# Patient Record
Sex: FEMALE | Race: BLACK OR AFRICAN AMERICAN | NOT HISPANIC OR LATINO | Employment: UNEMPLOYED | URBAN - METROPOLITAN AREA
[De-identification: names, ages, dates, MRNs, and addresses within clinical notes are randomized per-mention and may not be internally consistent; named-entity substitution may affect disease eponyms.]

---

## 2023-02-26 PROBLEM — A59.9 TRICHOMONAS INFECTION: Status: ACTIVE | Noted: 2023-02-26

## 2023-02-26 PROBLEM — F32.A DEPRESSION: Status: ACTIVE | Noted: 2023-02-26

## 2023-02-26 PROBLEM — Q18.1 CYST ON EAR: Status: ACTIVE | Noted: 2023-02-26

## 2023-02-26 PROBLEM — R30.0 DYSURIA: Status: ACTIVE | Noted: 2023-02-26

## 2023-02-26 PROBLEM — I63.9 ISCHEMIC STROKE OF FRONTAL LOBE (MULTI): Status: ACTIVE | Noted: 2023-02-26

## 2023-02-26 PROBLEM — E46 MALNUTRITION (MULTI): Status: ACTIVE | Noted: 2023-02-26

## 2023-02-26 PROBLEM — R53.82 CHRONIC FATIGUE: Status: ACTIVE | Noted: 2023-02-26

## 2023-02-26 PROBLEM — F10.10 ALCOHOL ABUSE: Status: ACTIVE | Noted: 2023-02-26

## 2023-02-26 PROBLEM — M25.50 JOINT PAIN: Status: ACTIVE | Noted: 2023-02-26

## 2023-02-26 PROBLEM — R06.09 DOE (DYSPNEA ON EXERTION): Status: ACTIVE | Noted: 2023-02-26

## 2023-02-26 PROBLEM — J45.909 ASTHMA (HHS-HCC): Status: ACTIVE | Noted: 2023-02-26

## 2023-02-26 PROBLEM — K86.0 ALCOHOL-INDUCED CHRONIC PANCREATITIS (MULTI): Status: ACTIVE | Noted: 2023-02-26

## 2023-02-26 PROBLEM — B35.3 TINEA PEDIS OF BOTH FEET: Status: ACTIVE | Noted: 2023-02-26

## 2023-02-26 PROBLEM — E55.9 VITAMIN D DEFICIENCY: Status: ACTIVE | Noted: 2023-02-26

## 2023-02-26 PROBLEM — R56.9 SEIZURE (MULTI): Status: ACTIVE | Noted: 2023-02-26

## 2023-02-26 PROBLEM — R00.2 HEART PALPITATIONS: Status: ACTIVE | Noted: 2023-02-26

## 2023-02-26 PROBLEM — I10 BENIGN HYPERTENSION: Status: ACTIVE | Noted: 2023-02-26

## 2023-02-26 PROBLEM — R79.0 LOW BLOOD MAGNESIUM: Status: ACTIVE | Noted: 2023-02-26

## 2023-02-26 PROBLEM — L85.3 DRY SKIN: Status: ACTIVE | Noted: 2023-02-26

## 2023-02-26 PROBLEM — B96.89 BACTERIAL VAGINOSIS: Status: ACTIVE | Noted: 2023-02-26

## 2023-02-26 PROBLEM — H52.13 MYOPIA OF BOTH EYES WITH REGULAR ASTIGMATISM: Status: ACTIVE | Noted: 2023-02-26

## 2023-02-26 PROBLEM — D50.9 IRON DEFICIENCY ANEMIA: Status: ACTIVE | Noted: 2023-02-26

## 2023-02-26 PROBLEM — H52.13 MYOPIA WITH PRESBYOPIA OF BOTH EYES: Status: ACTIVE | Noted: 2023-02-26

## 2023-02-26 PROBLEM — F43.10 PTSD (POST-TRAUMATIC STRESS DISORDER): Status: ACTIVE | Noted: 2023-02-26

## 2023-02-26 PROBLEM — F31.70 BIPOLAR DISORDER IN PARTIAL REMISSION (MULTI): Status: ACTIVE | Noted: 2023-02-26

## 2023-02-26 PROBLEM — N76.0 BACTERIAL VAGINOSIS: Status: ACTIVE | Noted: 2023-02-26

## 2023-02-26 PROBLEM — H40.013 OPEN ANGLE WITH BORDERLINE FINDINGS AND LOW GLAUCOMA RISK IN BOTH EYES: Status: ACTIVE | Noted: 2023-02-26

## 2023-02-26 PROBLEM — F17.200 NICOTINE DEPENDENCE: Status: ACTIVE | Noted: 2023-02-26

## 2023-02-26 PROBLEM — H52.223 MYOPIA OF BOTH EYES WITH REGULAR ASTIGMATISM: Status: ACTIVE | Noted: 2023-02-26

## 2023-02-26 PROBLEM — K86.1 PANCREATITIS, CHRONIC (MULTI): Status: ACTIVE | Noted: 2023-02-26

## 2023-02-26 PROBLEM — H52.03 HYPERMETROPIA OF BOTH EYES: Status: ACTIVE | Noted: 2023-02-26

## 2023-02-26 PROBLEM — H52.4 MYOPIA WITH PRESBYOPIA OF BOTH EYES: Status: ACTIVE | Noted: 2023-02-26

## 2023-02-26 PROBLEM — G47.00 INSOMNIA: Status: ACTIVE | Noted: 2023-02-26

## 2023-02-26 PROBLEM — R79.89 ABNORMAL LIVER FUNCTION TEST: Status: ACTIVE | Noted: 2023-02-26

## 2023-02-26 PROBLEM — D17.9 LIPOMA: Status: ACTIVE | Noted: 2023-02-26

## 2023-02-26 PROBLEM — R10.84 GENERALIZED ABDOMINAL PAIN: Status: ACTIVE | Noted: 2023-02-26

## 2023-02-26 RX ORDER — MAGNESIUM L-LACTATE 84 MG
1 TABLET, EXTENDED RELEASE ORAL 2 TIMES DAILY
COMMUNITY
Start: 2021-05-26 | End: 2023-09-22 | Stop reason: ALTCHOICE

## 2023-02-26 RX ORDER — HYDROXYZINE PAMOATE 25 MG/1
CAPSULE ORAL
COMMUNITY
End: 2023-09-22 | Stop reason: ALTCHOICE

## 2023-02-26 RX ORDER — LEVETIRACETAM 1000 MG/1
1 TABLET ORAL 2 TIMES DAILY
COMMUNITY
Start: 2017-02-15 | End: 2023-04-20 | Stop reason: SDUPTHER

## 2023-02-26 RX ORDER — FAMOTIDINE 20 MG/1
TABLET, FILM COATED ORAL
COMMUNITY
End: 2023-09-22 | Stop reason: SDUPTHER

## 2023-02-26 RX ORDER — LANOLIN ALCOHOL/MO/W.PET/CERES
100 CREAM (GRAM) TOPICAL DAILY
COMMUNITY
Start: 2021-05-26 | End: 2023-04-20 | Stop reason: SDUPTHER

## 2023-02-26 RX ORDER — ALBUTEROL SULFATE 90 UG/1
1 AEROSOL, METERED RESPIRATORY (INHALATION) EVERY 4 HOURS PRN
COMMUNITY
Start: 2020-02-18 | End: 2023-12-08 | Stop reason: SDUPTHER

## 2023-02-26 RX ORDER — ROSUVASTATIN CALCIUM 40 MG/1
TABLET, COATED ORAL
COMMUNITY
End: 2023-09-22 | Stop reason: SDUPTHER

## 2023-02-26 RX ORDER — AMLODIPINE BESYLATE 5 MG/1
1 TABLET ORAL DAILY
COMMUNITY
Start: 2020-08-22 | End: 2023-04-20 | Stop reason: SDUPTHER

## 2023-02-26 RX ORDER — DEXTROMETHORPHAN HYDROBROMIDE, GUAIFENESIN 5; 100 MG/5ML; MG/5ML
650 LIQUID ORAL EVERY 6 HOURS PRN
COMMUNITY
Start: 2022-01-14 | End: 2023-09-22 | Stop reason: SDUPTHER

## 2023-02-26 RX ORDER — CHOLECALCIFEROL (VITAMIN D3) 50 MCG
50 TABLET ORAL DAILY
COMMUNITY
End: 2023-09-22 | Stop reason: SDUPTHER

## 2023-02-26 RX ORDER — KETOCONAZOLE 20 MG/G
CREAM TOPICAL
COMMUNITY
Start: 2022-07-20 | End: 2023-09-22 | Stop reason: ALTCHOICE

## 2023-02-26 RX ORDER — LORAZEPAM 1 MG/1
TABLET ORAL
COMMUNITY
End: 2023-09-22 | Stop reason: ALTCHOICE

## 2023-02-26 RX ORDER — DICYCLOMINE HYDROCHLORIDE 10 MG/1
1 CAPSULE ORAL 3 TIMES DAILY
COMMUNITY
Start: 2022-01-14 | End: 2023-09-22 | Stop reason: SDUPTHER

## 2023-02-26 RX ORDER — ONDANSETRON 4 MG/1
4 TABLET, ORALLY DISINTEGRATING ORAL EVERY 8 HOURS PRN
COMMUNITY
Start: 2022-01-15 | End: 2023-09-22 | Stop reason: SDUPTHER

## 2023-02-26 RX ORDER — ASPIRIN 81 MG/1
1 TABLET ORAL DAILY
COMMUNITY
Start: 2017-01-10 | End: 2023-09-22 | Stop reason: SDUPTHER

## 2023-02-26 RX ORDER — OMEPRAZOLE 20 MG/1
20 TABLET, DELAYED RELEASE ORAL
COMMUNITY
Start: 2022-07-20 | End: 2023-09-22 | Stop reason: ALTCHOICE

## 2023-02-26 RX ORDER — MIRTAZAPINE 7.5 MG/1
1 TABLET, FILM COATED ORAL NIGHTLY
COMMUNITY
Start: 2021-05-26 | End: 2023-09-22 | Stop reason: SDUPTHER

## 2023-02-26 RX ORDER — MIRTAZAPINE 15 MG/1
TABLET, FILM COATED ORAL
COMMUNITY
End: 2023-09-22 | Stop reason: ALTCHOICE

## 2023-02-26 RX ORDER — GABAPENTIN 100 MG/1
CAPSULE ORAL
COMMUNITY
End: 2023-09-22 | Stop reason: ALTCHOICE

## 2023-02-26 RX ORDER — IBUPROFEN 800 MG/1
800 TABLET ORAL EVERY 8 HOURS PRN
COMMUNITY

## 2023-02-26 RX ORDER — DOCUSATE SODIUM 100 MG/1
CAPSULE, LIQUID FILLED ORAL
COMMUNITY
Start: 2022-01-20 | End: 2023-09-05 | Stop reason: SDUPTHER

## 2023-02-26 RX ORDER — FOLIC ACID 1 MG/1
1 TABLET ORAL DAILY
COMMUNITY
Start: 2016-09-14 | End: 2023-04-20 | Stop reason: SDUPTHER

## 2023-02-26 RX ORDER — GUAIFENESIN 600 MG/1
TABLET, EXTENDED RELEASE ORAL
COMMUNITY
End: 2023-09-22 | Stop reason: SDUPTHER

## 2023-02-26 RX ORDER — LANOLIN ALCOHOL/MO/W.PET/CERES
1 CREAM (GRAM) TOPICAL DAILY
COMMUNITY
Start: 2021-05-26 | End: 2023-07-18

## 2023-02-26 RX ORDER — NALTREXONE HYDROCHLORIDE 50 MG/1
50 TABLET, FILM COATED ORAL DAILY
COMMUNITY
Start: 2022-09-23 | End: 2023-09-22 | Stop reason: SDUPTHER

## 2023-02-26 RX ORDER — PANCRELIPASE 24000; 76000; 120000 [USP'U]/1; [USP'U]/1; [USP'U]/1
1 CAPSULE, DELAYED RELEASE PELLETS ORAL
COMMUNITY
End: 2023-09-22 | Stop reason: SDUPTHER

## 2023-02-26 RX ORDER — SENNOSIDES 8.6 MG/1
CAPSULE, GELATIN COATED ORAL
COMMUNITY
End: 2023-04-20 | Stop reason: SDUPTHER

## 2023-02-26 RX ORDER — FOLIC ACID 0.4 MG
1 TABLET ORAL DAILY
COMMUNITY
Start: 2022-07-20 | End: 2023-05-12

## 2023-02-26 RX ORDER — TALC
1 POWDER (GRAM) TOPICAL NIGHTLY
COMMUNITY
End: 2023-09-22 | Stop reason: SDUPTHER

## 2023-02-26 RX ORDER — PANTOPRAZOLE SODIUM 40 MG/1
40 TABLET, DELAYED RELEASE ORAL DAILY
COMMUNITY
End: 2023-09-22 | Stop reason: ALTCHOICE

## 2023-04-20 ENCOUNTER — OFFICE VISIT (OUTPATIENT)
Dept: PRIMARY CARE | Facility: CLINIC | Age: 48
End: 2023-04-20
Payer: COMMERCIAL

## 2023-04-20 VITALS
WEIGHT: 108.6 LBS | OXYGEN SATURATION: 100 % | HEART RATE: 104 BPM | BODY MASS INDEX: 19.86 KG/M2 | SYSTOLIC BLOOD PRESSURE: 114 MMHG | TEMPERATURE: 96.4 F | DIASTOLIC BLOOD PRESSURE: 78 MMHG

## 2023-04-20 DIAGNOSIS — B96.89 BV (BACTERIAL VAGINOSIS): ICD-10-CM

## 2023-04-20 DIAGNOSIS — I10 HYPERTENSION, UNSPECIFIED TYPE: ICD-10-CM

## 2023-04-20 DIAGNOSIS — F32.A DEPRESSION, UNSPECIFIED DEPRESSION TYPE: ICD-10-CM

## 2023-04-20 DIAGNOSIS — A59.9 TRICHIMONIASIS: ICD-10-CM

## 2023-04-20 DIAGNOSIS — K59.00 CONSTIPATION, UNSPECIFIED CONSTIPATION TYPE: ICD-10-CM

## 2023-04-20 DIAGNOSIS — N76.0 BV (BACTERIAL VAGINOSIS): ICD-10-CM

## 2023-04-20 DIAGNOSIS — G40.909 SEIZURE DISORDER (MULTI): ICD-10-CM

## 2023-04-20 DIAGNOSIS — R30.0 DYSURIA: ICD-10-CM

## 2023-04-20 DIAGNOSIS — Z13.1 SCREENING FOR DIABETES MELLITUS: ICD-10-CM

## 2023-04-20 DIAGNOSIS — Z78.9 ALCOHOL USE: ICD-10-CM

## 2023-04-20 DIAGNOSIS — N89.8 VAGINAL IRRITATION: Primary | ICD-10-CM

## 2023-04-20 DIAGNOSIS — Z59.41 FOOD INSECURITY: ICD-10-CM

## 2023-04-20 LAB
CLUE CELLS WET PREP: PRESENT
TRICH WET PREP: PRESENT
WBC WET PREP: ABNORMAL /HPF
YEAST PRESENCE WET PREP: ABNORMAL

## 2023-04-20 PROCEDURE — 81003 URINALYSIS AUTO W/O SCOPE: CPT

## 2023-04-20 PROCEDURE — 3078F DIAST BP <80 MM HG: CPT | Performed by: STUDENT IN AN ORGANIZED HEALTH CARE EDUCATION/TRAINING PROGRAM

## 2023-04-20 PROCEDURE — 87210 SMEAR WET MOUNT SALINE/INK: CPT

## 2023-04-20 PROCEDURE — 87491 CHLMYD TRACH DNA AMP PROBE: CPT

## 2023-04-20 PROCEDURE — 99214 OFFICE O/P EST MOD 30 MIN: CPT | Performed by: STUDENT IN AN ORGANIZED HEALTH CARE EDUCATION/TRAINING PROGRAM

## 2023-04-20 PROCEDURE — 87591 N.GONORRHOEAE DNA AMP PROB: CPT

## 2023-04-20 PROCEDURE — 3074F SYST BP LT 130 MM HG: CPT | Performed by: STUDENT IN AN ORGANIZED HEALTH CARE EDUCATION/TRAINING PROGRAM

## 2023-04-20 PROCEDURE — 87661 TRICHOMONAS VAGINALIS AMPLIF: CPT

## 2023-04-20 RX ORDER — LEVETIRACETAM 1000 MG/1
1000 TABLET ORAL 2 TIMES DAILY
Qty: 60 TABLET | Refills: 0 | Status: SHIPPED | OUTPATIENT
Start: 2023-04-20 | End: 2023-09-22 | Stop reason: SDUPTHER

## 2023-04-20 RX ORDER — FOLIC ACID 1 MG/1
1 TABLET ORAL DAILY
Qty: 90 TABLET | Refills: 0 | Status: SHIPPED | OUTPATIENT
Start: 2023-04-20 | End: 2023-07-19

## 2023-04-20 RX ORDER — PSYLLIUM HUSK 3.4 G/5.8G
10 POWDER ORAL DAILY
Qty: 660 G | Refills: 2 | Status: SHIPPED | OUTPATIENT
Start: 2023-04-20 | End: 2023-09-22 | Stop reason: ALTCHOICE

## 2023-04-20 RX ORDER — AMLODIPINE BESYLATE 5 MG/1
5 TABLET ORAL DAILY
Qty: 90 TABLET | Refills: 0 | Status: SHIPPED | OUTPATIENT
Start: 2023-04-20 | End: 2023-06-06 | Stop reason: SDUPTHER

## 2023-04-20 RX ORDER — SENNOSIDES 8.6 MG/1
8.6 CAPSULE, GELATIN COATED ORAL 2 TIMES DAILY
Qty: 180 CAPSULE | Refills: 0 | Status: SHIPPED | OUTPATIENT
Start: 2023-04-20 | End: 2023-07-19

## 2023-04-20 RX ORDER — MULTIVITAMIN
1 TABLET ORAL DAILY
Qty: 90 TABLET | Refills: 0 | Status: SHIPPED | OUTPATIENT
Start: 2023-04-20 | End: 2023-07-19

## 2023-04-20 RX ORDER — LANOLIN ALCOHOL/MO/W.PET/CERES
100 CREAM (GRAM) TOPICAL DAILY
Qty: 90 TABLET | Refills: 0 | Status: SHIPPED | OUTPATIENT
Start: 2023-04-20 | End: 2023-07-19

## 2023-04-20 SDOH — ECONOMIC STABILITY - FOOD INSECURITY: FOOD INSECURITY: Z59.41

## 2023-04-20 ASSESSMENT — PAIN SCALES - GENERAL: PAINLEVEL: 10-WORST PAIN EVER

## 2023-04-20 NOTE — PROGRESS NOTES
Subjective   Patient ID: Darinel Sommers is a 47 y.o. female who presents for UTI (possible).    HPI     DARINEL SOMMERS is a 47 year old Female with PMHx: polysubstance abuse, epilepsy, chronic pancreatitis w/pseudocyst rupture s/p splenic artery embolization (1/2019) who presents to the clinic for burning on urination and foot pain.     HPI  # Burning on urination  Since 1 month ago, patient experienced burning and itching sensation while urinating, but also when sleeping as well. She also reports being tired, but no fever. She finds her urine clear, no foams, and no blood. She also denies vaginal discharge, and has tried Vagisil which has not helped. Physical exam is negative for CVA tenderness and suprapubic tenderness.    # Itching of feet  Patient reports that since a few months ago, she felt itching/pain/pins and needles in the sole of her feet bilaterally when she sleeps. She does not take tylonol or ibuprofen due history of alcohol use and abnormal lifer function test. She denies swelling.    # Abdominal pain  Patient reports ULQ and URQ abdominal pain, tender to palpation. She has PMH constipation, and reports bowel movement of 1-4 times per month. Her last bowel movement was yesterday.     # Substance Use  Patient has a history of alcohol use, abnormal liver function test, and alcohol-induced chronic pancreatitis. She reports drinking wine on the weekends with friends, but does not keep a count of how much she consumes. She also endorses to smoking marijuana, and is interested marijuana cards.     Review of Systems    General: denies fever, chills, malaise  HEENT: denies vision change  CV: denies pain, endorses palpations  Pulm: denies SOB or cough  GI: endorses nausea, denies vomiting, constipation, diarrhea  : denies flank pain, denies hematuria  MSK: no swelling    Objective   /78   Pulse 104   Temp 35.8 °C (96.4 °F) (Tympanic)   Wt 49.3 kg (108 lb 9.6 oz)   SpO2 100%   BMI 19.86 kg/m²      Physical Exam    PHYSICAL EXAM:    - GENERAL: Alert and oriented x 3. No acute distress. Well-nourished.    - EYES: EOMI. Anicteric.    - HENT: Moist mucous membranes. No scleral icterus. No cervical lymphadenopathy.    - LUNGS: Clear to auscultation bilaterally. No accessory muscle use.    - CARDIOVASCULAR: Regular rate and rhythm. No murmur. No JVD.    - ABDOMEN: Soft, non-tender and non-distended. No palpable masses.    - EXTREMITIES: No edema. Non-tender.?SKIN: No rashes or lesions. Warm.    - NEUROLOGIC: No focal neurological deficits. CN II-XII grossly intact, but not individually tested.    - PSYCHIATRIC: Cooperative. Appropriate mood and affect.      Assessment/Plan       DARINEL SOMMERS is a 47 year old Female with PMHx: polysubstance abuse, epilepsy, chronic pancreatitis w/pseudocyst rupture s/p splenic artery embolization (1/2019) who presents to the clinic for burning on urination and foot pain.     # Burning on urination  Urine test for UTI and STIs  Wet prep obtained     # Itching of feet  Patients tingling of the feet could be 2/2 alcohol related neuropathy   Continue to observe, return visit if worsens  Advised alcohol cessation     # Abdominal pain  Patient's abdominal pain is consistent with chronic constipation.  Continue Senna + Colace   Continue with Creon   Rx Psyllium   Advised oral hydration     # Substance Use  Chronic alcoholism often leads to constipation and paresthesia  Discussed with patient the importance of reducing alcohol consumption  Discussed with patient the need to take folic acid regularly  Prescribe multivitamin, including thiamine     #Food Insecurity   -Food for life referral provided     Shital Springer MS3    I saw and evaluated the patient. I personally obtained the key and critical portions of the history and physical exam. I reviewed and modified the student's documentation and discussed patient with the medical student. I agree with the above documentation and medical  decision making.        Addendum:    Wet prep positive for clue cells and trichomonas consistent with BV and trichomoniasis. Prescribed metronidazole 500 twice daily for 7 days.  UA with no concern for UTI.    Patient seen and discussed with attending physician Dr. Silvia Moreira MD  Family Medicine, PGY-2    This note was completed using Dragon voice recognition technology and may include unintended errors with respect to translation of words, typographical errors or grammar errors which may not have been identified while finalizing the chart.

## 2023-04-21 ENCOUNTER — TELEPHONE (OUTPATIENT)
Dept: PRIMARY CARE | Facility: CLINIC | Age: 48
End: 2023-04-21
Payer: COMMERCIAL

## 2023-04-21 LAB
APPEARANCE, URINE: CLEAR
BILIRUBIN, URINE: NEGATIVE
BLOOD, URINE: NEGATIVE
CHLAMYDIA TRACH., AMPLIFIED: NEGATIVE
COLOR, URINE: YELLOW
GLUCOSE, URINE: NEGATIVE MG/DL
KETONES, URINE: NEGATIVE MG/DL
LEUKOCYTE ESTERASE, URINE: NEGATIVE
N. GONORRHEA, AMPLIFIED: NEGATIVE
NITRITE, URINE: NEGATIVE
PH, URINE: 6 (ref 5–8)
PROTEIN, URINE: NEGATIVE MG/DL
SPECIFIC GRAVITY, URINE: 1.01 (ref 1–1.03)
UROBILINOGEN, URINE: <2 MG/DL (ref 0–1.9)

## 2023-04-21 RX ORDER — METRONIDAZOLE 500 MG/1
500 TABLET ORAL 2 TIMES DAILY
Qty: 14 TABLET | Refills: 0 | Status: SHIPPED | OUTPATIENT
Start: 2023-04-21 | End: 2023-04-28

## 2023-04-22 LAB — TRICHOMONAS VAGINALIS: POSITIVE

## 2023-04-22 NOTE — TELEPHONE ENCOUNTER
Patient has Bacterial Vaginosis and Trichmoniasis. Sent prescription for Metronidazole 500 mg twice daily. Patient has not been answering phone call.

## 2023-04-28 NOTE — PROGRESS NOTES
I saw and evaluated the patient. I personally obtained the key and critical portions of the history and physical exam or was physically present for key and critical portions performed by the resident/fellow. I reviewed the resident/fellow's documentation and discussed the patient with the resident/fellow. I agree with the resident/fellow's medical decision making as documented in the note.    Tip Vega MD

## 2023-05-11 DIAGNOSIS — Z00.00 HEALTHCARE MAINTENANCE: Primary | ICD-10-CM

## 2023-05-12 RX ORDER — FOLIC ACID 0.4 MG
TABLET ORAL
Qty: 30 TABLET | Refills: 10 | Status: SHIPPED | OUTPATIENT
Start: 2023-05-12 | End: 2023-09-22 | Stop reason: SDUPTHER

## 2023-06-06 ENCOUNTER — OFFICE VISIT (OUTPATIENT)
Dept: PRIMARY CARE | Facility: CLINIC | Age: 48
End: 2023-06-06
Payer: COMMERCIAL

## 2023-06-06 VITALS
TEMPERATURE: 97.1 F | WEIGHT: 112 LBS | SYSTOLIC BLOOD PRESSURE: 116 MMHG | HEIGHT: 62 IN | HEART RATE: 85 BPM | DIASTOLIC BLOOD PRESSURE: 78 MMHG | BODY MASS INDEX: 20.61 KG/M2 | OXYGEN SATURATION: 97 %

## 2023-06-06 DIAGNOSIS — R22.1 MASS IN NECK: ICD-10-CM

## 2023-06-06 DIAGNOSIS — J30.9 ALLERGIC RHINITIS, UNSPECIFIED SEASONALITY, UNSPECIFIED TRIGGER: Primary | ICD-10-CM

## 2023-06-06 DIAGNOSIS — F10.90 ALCOHOL USE DISORDER: ICD-10-CM

## 2023-06-06 DIAGNOSIS — I10 HYPERTENSION, UNSPECIFIED TYPE: ICD-10-CM

## 2023-06-06 DIAGNOSIS — E63.9 NUTRITIONAL DEFICIENCY: ICD-10-CM

## 2023-06-06 PROCEDURE — 99214 OFFICE O/P EST MOD 30 MIN: CPT | Performed by: STUDENT IN AN ORGANIZED HEALTH CARE EDUCATION/TRAINING PROGRAM

## 2023-06-06 PROCEDURE — 3078F DIAST BP <80 MM HG: CPT | Performed by: STUDENT IN AN ORGANIZED HEALTH CARE EDUCATION/TRAINING PROGRAM

## 2023-06-06 PROCEDURE — 3074F SYST BP LT 130 MM HG: CPT | Performed by: STUDENT IN AN ORGANIZED HEALTH CARE EDUCATION/TRAINING PROGRAM

## 2023-06-06 RX ORDER — FLUTICASONE PROPIONATE 50 MCG
2 SPRAY, SUSPENSION (ML) NASAL DAILY
Qty: 16 G | Refills: 5 | Status: SHIPPED | OUTPATIENT
Start: 2023-06-06 | End: 2023-06-06

## 2023-06-06 RX ORDER — DISULFIRAM 250 MG/1
250 TABLET ORAL DAILY
Qty: 30 TABLET | Refills: 11 | Status: SHIPPED | OUTPATIENT
Start: 2023-06-06 | End: 2023-09-22 | Stop reason: ALTCHOICE

## 2023-06-06 RX ORDER — LORATADINE 10 MG/1
10 TABLET ORAL DAILY
Qty: 90 TABLET | Refills: 3 | Status: SHIPPED | OUTPATIENT
Start: 2023-06-06 | End: 2023-09-22 | Stop reason: ALTCHOICE

## 2023-06-06 RX ORDER — AMLODIPINE BESYLATE 5 MG/1
TABLET ORAL
COMMUNITY
Start: 2023-01-02 | End: 2023-09-22 | Stop reason: SDUPTHER

## 2023-06-06 RX ORDER — AMLODIPINE BESYLATE 5 MG/1
5 TABLET ORAL DAILY
Qty: 90 TABLET | Refills: 0 | Status: SHIPPED | OUTPATIENT
Start: 2023-06-06 | End: 2023-07-25 | Stop reason: SDUPTHER

## 2023-06-06 ASSESSMENT — PAIN SCALES - GENERAL: PAINLEVEL: 2

## 2023-06-06 NOTE — PROGRESS NOTES
"Subjective   Patient ID: Shi Trevino is a 47 y.o. female who presents for Follow-up (Med refill).    HPI   Needs a refill on Amlodipine, and boost (delivered home chocolate or strawberry)   And would like to address multiple issues as follows     # nasal drainage:   Started a couple months ago   When she wakes up in the morning she has runny nose  No itching, associated congestion, associated eye irritation (injection and tearfulness)   Doesn't notice a difference between indoor or outdoor     # Knot behind her ear:   Appeared in childhood, however since then it has been growing in size   Bothering her In appearance but not painful   No drainage or changes to skin      Review of Systems  Review of systems is negative besides what is mentioned in the HPI      Objective   /78 (BP Location: Left arm, Patient Position: Sitting, BP Cuff Size: Adult long)   Pulse 85   Temp 36.2 °C (97.1 °F) (Temporal)   Ht 1.575 m (5' 2\")   Wt 50.8 kg (112 lb)   SpO2 97%   BMI 20.49 kg/m²     Physical Exam  General: Alert and oriented*3, not in acute distress   Cardiac: S1, S2 normal, no murmurs, no lower extremity edema.  Respiratory: CTAB, no wheezing or crackles   HEENT: boggy nasal turbinates and pink mucosa, 2cm soft cystic lesion behind the right auricle, non tender to palpation, intact skin, no drainage or erythema   Psych: appropriate mood and affect to the clinical setting      Assessment/Plan   Ms. Trevino is here today to address multiple complaints and for medication refills.   She has PMHx significant for HTN, alcohol use disorder, bipolar disorder, PTSD, seizure disorder, HTN, asthma, chronic pancreatitis, non obstructive CAD, mitral and aortic regurgitation who is here today for med refill and multiple medical complaints addressed as follow:     # Allergic rhinitis:   - start Loratadine daily   - start Flonase daily     # Dermoid Vs  Bronchial Cyst Vs Lipoma:   - Referral to ENT provided today     Medication " refills provided today   RTC in 4-6 weeks for close F/U   Resources list for Alcohol use provided today   Patient's HPI, physical exam and plan of care was discussed with the attending physician Dr. Inez Santos MD  Family Medicine, PGY-3  Janee

## 2023-06-07 NOTE — PROGRESS NOTES
I saw and evaluated the patient. I personally obtained the key and critical portions of the history and physical exam or was physically present for key and critical portions performed by the resident/fellow. I reviewed the resident/fellow's documentation and discussed the patient with the resident/fellow. I agree with the resident/fellow's medical decision making as documented in the note.    Mir Wiley MD

## 2023-07-12 DIAGNOSIS — Z78.9 ALCOHOL USE: Primary | ICD-10-CM

## 2023-07-18 DIAGNOSIS — Z78.9 ALCOHOL USE: ICD-10-CM

## 2023-07-18 RX ORDER — LANOLIN ALCOHOL/MO/W.PET/CERES
CREAM (GRAM) TOPICAL
Qty: 30 TABLET | Refills: 10 | Status: SHIPPED | OUTPATIENT
Start: 2023-07-18 | End: 2023-07-19

## 2023-07-19 RX ORDER — LANOLIN ALCOHOL/MO/W.PET/CERES
CREAM (GRAM) TOPICAL
Qty: 30 TABLET | Refills: 10 | Status: SHIPPED | OUTPATIENT
Start: 2023-07-19 | End: 2023-09-22 | Stop reason: SDUPTHER

## 2023-08-14 ENCOUNTER — PATIENT OUTREACH (OUTPATIENT)
Dept: CARE COORDINATION | Facility: CLINIC | Age: 48
End: 2023-08-14
Payer: COMMERCIAL

## 2023-08-20 DIAGNOSIS — E63.9 NUTRITIONAL DEFICIENCY: ICD-10-CM

## 2023-09-05 PROBLEM — F10.20 ALCOHOL USE DISORDER, MODERATE, DEPENDENCE (MULTI): Chronic | Status: ACTIVE | Noted: 2022-09-13

## 2023-09-05 PROBLEM — H02.883 MEIBOMIAN GLAND DYSFUNCTION (MGD) OF BOTH EYES: Status: ACTIVE | Noted: 2023-09-05

## 2023-09-05 PROBLEM — Z91.148 NONCOMPLIANCE WITH MEDICATION REGIMEN: Status: ACTIVE | Noted: 2019-11-25

## 2023-09-05 PROBLEM — I81 PORTAL VEIN THROMBOSIS: Status: ACTIVE | Noted: 2017-12-16

## 2023-09-05 PROBLEM — K80.50 CHOLEDOCHOLITHIASIS: Status: ACTIVE | Noted: 2023-05-13

## 2023-09-05 PROBLEM — D53.9 MACROCYTIC ANEMIA: Status: ACTIVE | Noted: 2020-09-14

## 2023-09-05 PROBLEM — K86.3 PSEUDOCYST OF PANCREAS (HHS-HCC): Status: ACTIVE | Noted: 2020-09-14

## 2023-09-05 PROBLEM — E87.29 ALCOHOLIC KETOACIDOSIS: Status: ACTIVE | Noted: 2020-09-14

## 2023-09-05 PROBLEM — H02.886 MEIBOMIAN GLAND DYSFUNCTION (MGD) OF BOTH EYES: Status: ACTIVE | Noted: 2023-09-05

## 2023-09-05 PROBLEM — R94.31 PROLONGED Q-T INTERVAL ON ECG: Status: ACTIVE | Noted: 2020-10-09

## 2023-09-05 PROBLEM — G62.9 PERIPHERAL POLYNEUROPATHY: Status: ACTIVE | Noted: 2020-09-14

## 2023-09-05 PROBLEM — E87.20 METABOLIC ACIDOSIS, NORMAL ANION GAP (NAG): Status: ACTIVE | Noted: 2020-10-09

## 2023-09-05 PROBLEM — D69.6 THROMBOCYTOPENIA (CMS-HCC): Status: ACTIVE | Noted: 2020-09-14

## 2023-09-05 RX ORDER — METOPROLOL TARTRATE 25 MG/1
25 TABLET, FILM COATED ORAL 2 TIMES DAILY
COMMUNITY
Start: 2023-02-13 | End: 2023-09-22 | Stop reason: ALTCHOICE

## 2023-09-05 RX ORDER — PANCRELIPASE 24000; 90750; 86250 [USP'U]/1; [USP'U]/1; [USP'U]/1
1 CAPSULE, DELAYED RELEASE ORAL 3 TIMES DAILY
COMMUNITY
End: 2023-09-22 | Stop reason: SDUPTHER

## 2023-09-05 RX ORDER — ACETAMINOPHEN 500 MG/1
500 CAPSULE, LIQUID FILLED ORAL EVERY 6 HOURS PRN
COMMUNITY
Start: 2018-06-02 | End: 2023-09-22 | Stop reason: SDUPTHER

## 2023-09-05 RX ORDER — UBIDECARENONE 75 MG
500 CAPSULE ORAL
COMMUNITY
Start: 2020-10-13 | End: 2023-09-22 | Stop reason: SDUPTHER

## 2023-09-05 RX ORDER — ALBUTEROL SULFATE 90 UG/1
2 AEROSOL, METERED RESPIRATORY (INHALATION)
COMMUNITY
Start: 2018-05-02 | End: 2023-09-22 | Stop reason: SDUPTHER

## 2023-09-05 RX ORDER — LEVETIRACETAM 500 MG/1
500 TABLET ORAL 2 TIMES DAILY
COMMUNITY
Start: 2020-10-13 | End: 2023-09-22 | Stop reason: ALTCHOICE

## 2023-09-05 RX ORDER — ESOMEPRAZOLE MAGNESIUM 40 MG/1
40 CAPSULE, DELAYED RELEASE ORAL
COMMUNITY
Start: 2020-09-18 | End: 2023-09-22 | Stop reason: ALTCHOICE

## 2023-09-05 RX ORDER — POLYETHYLENE GLYCOL 3350 17 G/17G
17 POWDER, FOR SOLUTION ORAL
COMMUNITY
Start: 2023-05-19 | End: 2023-09-22 | Stop reason: ALTCHOICE

## 2023-09-05 RX ORDER — ACETAMINOPHEN 500 MG
1 TABLET ORAL NIGHTLY
COMMUNITY
Start: 2020-10-13 | End: 2023-09-22 | Stop reason: ALTCHOICE

## 2023-09-05 RX ORDER — DOCUSATE SODIUM 100 MG/1
100 CAPSULE, LIQUID FILLED ORAL 2 TIMES DAILY
COMMUNITY
Start: 2020-10-13 | End: 2023-09-22 | Stop reason: SDUPTHER

## 2023-09-05 RX ORDER — OMEPRAZOLE 40 MG/1
40 CAPSULE, DELAYED RELEASE ORAL
COMMUNITY
Start: 2020-09-17 | End: 2023-09-22 | Stop reason: ALTCHOICE

## 2023-09-05 RX ORDER — SENNOSIDES 8.6 MG/1
8.6 TABLET ORAL 2 TIMES DAILY
COMMUNITY
Start: 2023-05-18 | End: 2023-09-22 | Stop reason: ALTCHOICE

## 2023-09-05 RX ORDER — UBIDECARENONE 30 MG
1 CAPSULE ORAL DAILY
COMMUNITY
Start: 2023-01-03 | End: 2023-09-22 | Stop reason: ALTCHOICE

## 2023-09-05 RX ORDER — MULTIVIT-MIN/IRON FUM/FOLIC AC 7.5 MG-4
TABLET ORAL
COMMUNITY
Start: 2023-08-10 | End: 2023-09-22 | Stop reason: SDUPTHER

## 2023-09-05 RX ORDER — OMEPRAZOLE 20 MG/1
20 CAPSULE, DELAYED RELEASE ORAL DAILY
COMMUNITY
Start: 2023-01-20 | End: 2023-09-22 | Stop reason: ALTCHOICE

## 2023-09-05 RX ORDER — ACETAMINOPHEN 500 MG
1000 TABLET ORAL EVERY 6 HOURS PRN
COMMUNITY
Start: 2023-08-10

## 2023-09-05 RX ORDER — GABAPENTIN 300 MG/1
300 CAPSULE ORAL
COMMUNITY
Start: 2020-10-13 | End: 2023-09-22 | Stop reason: SDUPTHER

## 2023-09-05 RX ORDER — METOPROLOL SUCCINATE 50 MG/1
TABLET, EXTENDED RELEASE ORAL
COMMUNITY
Start: 2023-07-31 | End: 2023-09-22 | Stop reason: ALTCHOICE

## 2023-09-05 RX ORDER — VIT C/E/ZN/COPPR/LUTEIN/ZEAXAN 250MG-90MG
1 CAPSULE ORAL
COMMUNITY
Start: 2020-10-13 | End: 2023-09-22 | Stop reason: ALTCHOICE

## 2023-09-22 ENCOUNTER — OFFICE VISIT (OUTPATIENT)
Dept: PRIMARY CARE | Facility: CLINIC | Age: 48
End: 2023-09-22
Payer: COMMERCIAL

## 2023-09-22 VITALS
DIASTOLIC BLOOD PRESSURE: 72 MMHG | HEART RATE: 93 BPM | WEIGHT: 106.3 LBS | HEIGHT: 62 IN | SYSTOLIC BLOOD PRESSURE: 102 MMHG | OXYGEN SATURATION: 96 % | BODY MASS INDEX: 19.56 KG/M2 | TEMPERATURE: 98.2 F

## 2023-09-22 DIAGNOSIS — Z86.73 HISTORY OF CVA (CEREBROVASCULAR ACCIDENT): ICD-10-CM

## 2023-09-22 DIAGNOSIS — F17.210 CIGARETTE NICOTINE DEPENDENCE WITHOUT COMPLICATION: ICD-10-CM

## 2023-09-22 DIAGNOSIS — E55.9 VITAMIN D DEFICIENCY: ICD-10-CM

## 2023-09-22 DIAGNOSIS — M62.89 HIGH-TONE PELVIC FLOOR DYSFUNCTION: Primary | ICD-10-CM

## 2023-09-22 DIAGNOSIS — F31.70 BIPOLAR DISORDER IN PARTIAL REMISSION, MOST RECENT EPISODE UNSPECIFIED TYPE (MULTI): ICD-10-CM

## 2023-09-22 DIAGNOSIS — K86.0 ALCOHOL-INDUCED CHRONIC PANCREATITIS (MULTI): ICD-10-CM

## 2023-09-22 DIAGNOSIS — Z78.9 ALCOHOL USE: ICD-10-CM

## 2023-09-22 DIAGNOSIS — G40.909 SEIZURE DISORDER (MULTI): ICD-10-CM

## 2023-09-22 DIAGNOSIS — N89.8 VAGINAL DISCHARGE: ICD-10-CM

## 2023-09-22 DIAGNOSIS — Z11.3 ROUTINE SCREENING FOR STI (SEXUALLY TRANSMITTED INFECTION): ICD-10-CM

## 2023-09-22 DIAGNOSIS — N89.8 VAGINAL ITCHING: ICD-10-CM

## 2023-09-22 DIAGNOSIS — K59.00 CONSTIPATION, UNSPECIFIED CONSTIPATION TYPE: ICD-10-CM

## 2023-09-22 DIAGNOSIS — I10 BENIGN HYPERTENSION: ICD-10-CM

## 2023-09-22 LAB
CLUE CELLS WET PREP: NORMAL
TRICH WET PREP: NORMAL
TRICHOMONAS REFLEX COMMENT: NORMAL
WBC WET PREP: NORMAL /HPF
YEAST PRESENCE WET PREP: NORMAL

## 2023-09-22 PROCEDURE — 87210 SMEAR WET MOUNT SALINE/INK: CPT

## 2023-09-22 PROCEDURE — 3074F SYST BP LT 130 MM HG: CPT | Performed by: STUDENT IN AN ORGANIZED HEALTH CARE EDUCATION/TRAINING PROGRAM

## 2023-09-22 PROCEDURE — 87591 N.GONORRHOEAE DNA AMP PROB: CPT

## 2023-09-22 PROCEDURE — 3078F DIAST BP <80 MM HG: CPT | Performed by: STUDENT IN AN ORGANIZED HEALTH CARE EDUCATION/TRAINING PROGRAM

## 2023-09-22 PROCEDURE — 87491 CHLMYD TRACH DNA AMP PROBE: CPT

## 2023-09-22 PROCEDURE — 87661 TRICHOMONAS VAGINALIS AMPLIF: CPT

## 2023-09-22 PROCEDURE — 99213 OFFICE O/P EST LOW 20 MIN: CPT | Performed by: STUDENT IN AN ORGANIZED HEALTH CARE EDUCATION/TRAINING PROGRAM

## 2023-09-22 RX ORDER — ONDANSETRON 4 MG/1
4 TABLET, ORALLY DISINTEGRATING ORAL EVERY 8 HOURS PRN
Qty: 10 TABLET | Refills: 3 | Status: SHIPPED | OUTPATIENT
Start: 2023-09-22 | End: 2024-01-11

## 2023-09-22 RX ORDER — FAMOTIDINE 20 MG/1
20 TABLET, FILM COATED ORAL 2 TIMES DAILY PRN
Qty: 60 TABLET | Refills: 3 | Status: SHIPPED | OUTPATIENT
Start: 2023-09-22 | End: 2024-01-11

## 2023-09-22 RX ORDER — GUAIFENESIN 600 MG/1
1200 TABLET, EXTENDED RELEASE ORAL 2 TIMES DAILY PRN
Qty: 30 TABLET | Refills: 3 | Status: SHIPPED | OUTPATIENT
Start: 2023-09-22 | End: 2024-01-11

## 2023-09-22 RX ORDER — DICYCLOMINE HYDROCHLORIDE 10 MG/1
10 CAPSULE ORAL 3 TIMES DAILY
Qty: 90 CAPSULE | Refills: 11 | Status: SHIPPED | OUTPATIENT
Start: 2023-09-22 | End: 2024-09-21

## 2023-09-22 RX ORDER — GABAPENTIN 300 MG/1
300 CAPSULE ORAL
Qty: 90 CAPSULE | Refills: 3 | Status: SHIPPED | OUTPATIENT
Start: 2023-09-22 | End: 2024-09-21

## 2023-09-22 RX ORDER — FLUCONAZOLE 150 MG/1
150 TABLET ORAL ONCE
Qty: 1 TABLET | Refills: 0 | Status: SHIPPED | OUTPATIENT
Start: 2023-09-22 | End: 2023-09-22

## 2023-09-22 RX ORDER — PANCRELIPASE 24000; 76000; 120000 [USP'U]/1; [USP'U]/1; [USP'U]/1
1 CAPSULE, DELAYED RELEASE PELLETS ORAL
Qty: 90 CAPSULE | Refills: 11 | Status: SHIPPED | OUTPATIENT
Start: 2023-09-22 | End: 2024-09-21

## 2023-09-22 RX ORDER — MIRTAZAPINE 7.5 MG/1
7.5 TABLET, FILM COATED ORAL NIGHTLY
Qty: 90 TABLET | Refills: 3 | Status: SHIPPED | OUTPATIENT
Start: 2023-09-22 | End: 2024-09-21

## 2023-09-22 RX ORDER — ASPIRIN 81 MG/1
81 TABLET ORAL DAILY
Qty: 90 TABLET | Refills: 3 | Status: SHIPPED | OUTPATIENT
Start: 2023-09-22 | End: 2024-09-21

## 2023-09-22 RX ORDER — NALTREXONE HYDROCHLORIDE 50 MG/1
50 TABLET, FILM COATED ORAL DAILY
Qty: 90 TABLET | Refills: 3 | Status: SHIPPED | OUTPATIENT
Start: 2023-09-22 | End: 2024-09-21

## 2023-09-22 RX ORDER — LEVETIRACETAM 1000 MG/1
1000 TABLET ORAL 2 TIMES DAILY
Qty: 180 TABLET | Refills: 3 | Status: ON HOLD | OUTPATIENT
Start: 2023-09-22 | End: 2023-12-28 | Stop reason: SDUPTHER

## 2023-09-22 RX ORDER — TALC
3 POWDER (GRAM) TOPICAL NIGHTLY
Qty: 90 TABLET | Refills: 3 | Status: SHIPPED | OUTPATIENT
Start: 2023-09-22 | End: 2024-09-21

## 2023-09-22 RX ORDER — LANOLIN ALCOHOL/MO/W.PET/CERES
1000 CREAM (GRAM) TOPICAL DAILY
Qty: 30 TABLET | Refills: 10 | Status: SHIPPED | OUTPATIENT
Start: 2023-09-22

## 2023-09-22 RX ORDER — CHOLECALCIFEROL (VITAMIN D3) 50 MCG
50 TABLET ORAL DAILY
Qty: 90 TABLET | Refills: 3 | Status: SHIPPED | OUTPATIENT
Start: 2023-09-22 | End: 2024-09-21

## 2023-09-22 RX ORDER — DOCUSATE SODIUM 100 MG/1
100 CAPSULE, LIQUID FILLED ORAL 2 TIMES DAILY PRN
Qty: 60 CAPSULE | Refills: 11 | Status: SHIPPED | OUTPATIENT
Start: 2023-09-22

## 2023-09-22 RX ORDER — ROSUVASTATIN CALCIUM 40 MG/1
40 TABLET, COATED ORAL DAILY
Qty: 90 TABLET | Refills: 3 | Status: SHIPPED | OUTPATIENT
Start: 2023-09-22 | End: 2024-09-21

## 2023-09-22 RX ORDER — MULTIVIT-MIN/IRON FUM/FOLIC AC 7.5 MG-4
1 TABLET ORAL DAILY
Qty: 90 TABLET | Refills: 3 | Status: SHIPPED | OUTPATIENT
Start: 2023-09-22 | End: 2024-09-21

## 2023-09-22 RX ORDER — AMLODIPINE BESYLATE 5 MG/1
5 TABLET ORAL DAILY
Qty: 90 TABLET | Refills: 3 | Status: SHIPPED | OUTPATIENT
Start: 2023-09-22 | End: 2024-09-21

## 2023-09-22 ASSESSMENT — PAIN SCALES - GENERAL: PAINLEVEL: 10-WORST PAIN EVER

## 2023-09-22 NOTE — PROGRESS NOTES
"Subjective   Patient ID: Shi is a 48 y.o. female with PMH of HTN, thrombocytopenia, EtOH pancreatitis, portal vein thrombosis, bipolar disorder/PTSD, ischemic CVA of temporal lobe, seizure disorder, asthma, active smoking who presents for Follow-up (STD concern).    - patient requesting general medication renewal, although is unclear on which meds she's taking daily; did not bring a med list or her bottles    #Dyspareunia  - patient states that she recently tried having penetrative sex for the first time in years and she had to stop prematurely because the pain with initial insertion was too great  - no known STI exposure  - has had some more vaginal discharge than usual recently, although no abnormal odor; intermittent itching (mild)  - does think that she has some lubrication issues, had not tried using lube with her recent sexual encounter  Sexual History:  - Sexually active? Not active for past 2 years, tried again about a week ago and had significant pelvic pain  - Gender of historical partner(s): male  - Number of partners in the past 6mo: 1  - Attempting pregnancy? No, reportedly postmenopausal since her 30s, still has uterus and ovaries  - Contraception: no method, did not use condoms recently  - no history of sexual trauma  - Sexual Dysfunction? Yes, describe: pain, also does feel that she has issues with lubrication  - Known STI exposure? No  - H/o STIs? Yes, describe: trichomonas \"years ago,\" reports being treated, but can't remember if she was treated afterward      12 system ROS completed, negative except as noted above.    Current Outpatient Medications on File Prior to Visit   Medication Sig Dispense Refill    acetaminophen (Tylenol) 500 mg tablet Take 2 tablets (1,000 mg) by mouth every 6 hours if needed.      fluticasone (Flonase) 50 mcg/actuation nasal spray Administer 2 sprays into each nostril once daily. Shake gently. Before first use, prime pump. After use, clean tip and replace cap. 16 g 5 "    ibuprofen 800 mg tablet Take 1 tablet (800 mg) by mouth every 8 hours if needed for moderate pain (4 - 6).      [DISCONTINUED] amLODIPine (Norvasc) 5 mg tablet 1 tablet DAILY (route: oral)      [DISCONTINUED] aspirin 81 mg EC tablet Take 1 tablet (81 mg) by mouth once daily.      [DISCONTINUED] cholecalciferol (Vitamin D-3) 25 MCG (1000 UT) capsule Take 1 capsule (25 mcg) by mouth once daily.      [DISCONTINUED] cyanocobalamin (Vitamin B-12) 1,000 mcg tablet TAKE 1 TABLET BY MOUTH DAILY AS DIRECTED 30 tablet 10    [DISCONTINUED] disulfiram (Antabuse) 250 mg tablet Take 1 tablet (250 mg) by mouth once daily. 30 tablet 11    [DISCONTINUED] docusate sodium (Colace) 100 mg capsule Take 1 capsule (100 mg) by mouth twice a day.      [DISCONTINUED] esomeprazole (NexIUM) 40 mg DR capsule Take 1 capsule (40 mg) by mouth once daily in the morning. Take before meals.      [DISCONTINUED] folic acid (Folvite) 400 mcg tablet TAKE 1 TABLET BY MOUTH DAILY AS DIRECTED 30 tablet 10    [DISCONTINUED] gabapentin (Neurontin) 100 mg capsule       [DISCONTINUED] ketoconazole (NIZOral) 2 % cream APPLY A THIN LAYER TO AFFECTED AREA(S) TWICE DAILY.      [DISCONTINUED] loratadine (Claritin) 10 mg tablet Take 1 tablet (10 mg) by mouth once daily. 90 tablet 3    albuterol 90 mcg/actuation inhaler Inhale 1 puff every 4 hours if needed.      [DISCONTINUED] acetaminophen (Tylenol 8 Hour) 650 mg ER tablet Take 1 tablet (650 mg) by mouth every 6 hours if needed. Do not exceed 5 tablets in 1 day      [DISCONTINUED] acetaminophen (Tylenol) 500 mg capsule Take 1 capsule (500 mg) by mouth every 6 hours if needed (pain).      [DISCONTINUED] albuterol 90 mcg/actuation inhaler Inhale 2 puffs.      [DISCONTINUED] cholecalciferol (Vitamin D-3) 50 MCG (2000 UT) tablet Take 1 tablet (50 mcg) by mouth once daily.      [DISCONTINUED] cyanocobalamin (Vitamin B-12) 500 mcg tablet Take 1 tablet (500 mcg) by mouth once daily.      [DISCONTINUED] dicyclomine  (Bentyl) 10 mg capsule Take 1 capsule (10 mg) by mouth 3 times a day.      [DISCONTINUED] Essential Woman 50 Plus 0.4-250 mg-mcg tablet Take 1 tablet by mouth once daily.      [DISCONTINUED] eucerin cream Use as directed      [DISCONTINUED] famotidine (Pepcid) 20 mg tablet       [DISCONTINUED] gabapentin (Neurontin) 300 mg capsule Take 1 capsule (300 mg) by mouth once daily.      [DISCONTINUED] guaiFENesin (Mucinex) 600 mg 12 hr tablet Do not crush, chew, or split.      [DISCONTINUED] hydrOXYzine pamoate (Vistaril) 25 mg capsule       [DISCONTINUED] levETIRAcetam (Keppra) 1,000 mg tablet Take 1 tablet (1,000 mg) by mouth in the morning and 1 tablet (1,000 mg) before bedtime. 60 tablet 0    [DISCONTINUED] levETIRAcetam (Keppra) 500 mg tablet Take 1 tablet (500 mg) by mouth twice a day.      [DISCONTINUED] lipase-protease-amylase (Creon) 24,000-76,000 -120,000 unit capsule Take 1 capsule by mouth. Before meals      [DISCONTINUED] lipase-protease-amylase (Pertzye) 24,000-86,250- 90,750 unit capsule,delayed release(DR/EC) capsule Take 1 capsule by mouth 3 times a day.      [DISCONTINUED] LORazepam (Ativan) 1 mg tablet       [DISCONTINUED] magnesium lactate CR (Magtab) 84 mg ER tablet Take 1 tablet (84 mg) by mouth in the morning and 1 tablet (84 mg) before bedtime.      [DISCONTINUED] melatonin 3 mg tablet Take 1 tablet (3 mg) by mouth once daily at bedtime.      [DISCONTINUED] melatonin 5 mg tablet Take 1 tablet (5 mg) by mouth once daily at bedtime.      [DISCONTINUED] metoprolol succinate XL (Toprol-XL) 50 mg 24 hr tablet       [DISCONTINUED] metoprolol tartrate (Lopressor) 25 mg tablet Take 1 tablet (25 mg) by mouth 2 times a day.      [DISCONTINUED] mirtazapine (Remeron) 15 mg tablet       [DISCONTINUED] mirtazapine (Remeron) 7.5 mg tablet Take 1 tablet (7.5 mg) by mouth once daily at bedtime.      [DISCONTINUED] multivit-min-iron fum-folic ac 7.5 mg iron-400 mcg tablet       [DISCONTINUED] naltrexone (Depade) 50  "mg tablet Take 1 tablet (50 mg) by mouth once daily. Increase to 2 tablets once daily if still getting cravings after 1 week      [DISCONTINUED] omeprazole (PriLOSEC) 20 mg DR capsule Take 1 capsule (20 mg) by mouth once daily. BEFORE A MEAL      [DISCONTINUED] omeprazole (PriLOSEC) 40 mg DR capsule Take 1 capsule (40 mg) by mouth once daily in the morning. Take before meals.      [DISCONTINUED] omeprazole OTC (PriLOSEC OTC) 20 mg EC tablet Take 1 tablet (20 mg) by mouth. Before meal every day      [DISCONTINUED] ondansetron ODT (Zofran-ODT) 4 mg disintegrating tablet Take 1 tablet (4 mg) by mouth every 8 hours if needed for nausea.      [DISCONTINUED] pantoprazole (ProtoNix) 40 mg EC tablet Take 1 tablet (40 mg) by mouth once daily. Do not crush, chew, or split.      [DISCONTINUED] pedi multivit 43-iron fumarate (Flintstones Complete, iron,) 18 mg iron tablet,chewable Chew 1 tablet once daily.      [DISCONTINUED] polyethylene glycol (Glycolax, Miralax) 17 gram/dose powder Take 17 g by mouth once daily.  Dissolve dose in 4 - 8 ounces of liquid and take as directed.      [DISCONTINUED] psyllium husk, aspartame, (Metamucil Sugar-Free, aspart,) 3.4 gram/5.8 gram powder Take 10 g by mouth once daily. 660 g 2    [DISCONTINUED] rosuvastatin (Crestor) 40 mg tablet       [DISCONTINUED] sennosides (Senokot) 8.6 mg tablet Take 1 tablet (8.6 mg) by mouth twice a day.       No current facility-administered medications on file prior to visit.        Objective   Vitals: /72 (BP Location: Right arm, Patient Position: Sitting)   Pulse 93   Temp 36.8 °C (98.2 °F) (Temporal)   Ht 1.575 m (5' 2\")   Wt 48.2 kg (106 lb 4.8 oz)   SpO2 96%   BMI 19.44 kg/m²      Physical Exam  Vitals reviewed.   Constitutional:       General: She is not in acute distress.     Appearance: Normal appearance. She is not ill-appearing.   HENT:      Head: Normocephalic and atraumatic.   Eyes:      Extraocular Movements: Extraocular movements " intact.      Conjunctiva/sclera: Conjunctivae normal.   Cardiovascular:      Rate and Rhythm: Normal rate and regular rhythm.      Heart sounds: No murmur heard.     No friction rub. No gallop.   Pulmonary:      Effort: Pulmonary effort is normal. No respiratory distress.      Breath sounds: Normal breath sounds. No wheezing, rhonchi or rales.   Abdominal:      General: Abdomen is flat. There is no distension.      Palpations: Abdomen is soft.   Genitourinary:     Pubic Area: No rash.       Labia:         Right: No rash or lesion.         Left: No rash or lesion.       Vagina: Normal.      Cervix: Normal.      Comments: No notable atrophy. High tone pelvic floor muscles with notable reproducible pain with palpation of muscle bodies at vaginal entroitus at 3:00, 6:00, 9:00, and 12:00.  Musculoskeletal:         General: No tenderness or signs of injury. Normal range of motion.      Cervical back: Normal range of motion.      Right lower leg: No edema.      Left lower leg: No edema.   Skin:     General: Skin is warm and dry.   Neurological:      General: No focal deficit present.      Mental Status: She is alert and oriented to person, place, and time.      Cranial Nerves: No cranial nerve deficit.      Motor: No weakness.      Gait: Gait normal.   Psychiatric:         Mood and Affect: Mood normal.         Behavior: Behavior normal.       Assessment/Plan   Problem List Items Addressed This Visit       Alcohol-induced chronic pancreatitis (CMS/HCC)    Relevant Medications    famotidine (Pepcid) 20 mg tablet    ondansetron ODT (Zofran-ODT) 4 mg disintegrating tablet    lipase-protease-amylase (Creon) 24,000-76,000 -120,000 unit capsule    dicyclomine (Bentyl) 10 mg capsule    Benign hypertension    Relevant Medications    amLODIPine (Norvasc) 5 mg tablet    Bipolar disorder in partial remission (CMS/HCC)    Relevant Medications    mirtazapine (Remeron) 7.5 mg tablet    melatonin 3 mg tablet    gabapentin (Neurontin) 300  mg capsule    Nicotine dependence    Relevant Medications    guaiFENesin (Mucinex) 600 mg 12 hr tablet    Vitamin D deficiency    Relevant Medications    cholecalciferol (Vitamin D-3) 50 MCG (2000 UT) tablet    High-tone pelvic floor dysfunction - Primary     For pelvic floor PT. Most likely cause of insertional dyspareunia, although will also rule out infectious causes (sent testing today). Up to date on pap, cervix grossly normal on exam. No abnormal bleeding. Does not appear to have significant vaginal atrophy, recommended water based lubrication as needed for sexual activity.         Relevant Orders    Referral to Physical Therapy     Other Visit Diagnoses       Alcohol use        Relevant Medications    cyanocobalamin (Vitamin B-12) 1,000 mcg tablet    naltrexone (Depade) 50 mg tablet    multivit-min-iron fum-folic ac 7.5 mg iron-400 mcg tablet    Seizure disorder (CMS/HCC)        Relevant Medications    levETIRAcetam (Keppra) 1,000 mg tablet    Routine screening for STI (sexually transmitted infection)        Relevant Orders    HIV 1/2 Antigen/Antibody Screen with Reflex to Confirmation    Syphilis Screen with Reflex    Hepatitis C Antibody    C. Trachomatis / N. Gonorrhoeae, Amplified Detection (Completed)    Vaginal discharge        Relevant Orders    Trichomonas Wet Prep Reflex to PCR (Completed)    TRICH VAGINALIS, AMPLIFIED (Completed)    Constipation, unspecified constipation type        Relevant Medications    docusate sodium (Colace) 100 mg capsule    History of CVA (cerebrovascular accident)        Relevant Medications    rosuvastatin (Crestor) 40 mg tablet    aspirin 81 mg EC tablet    Vaginal itching              Patient Attending Supervision: discussed with attending physician (cosigner listed on this note).    RTC in 1 month, or earlier as needed.    Roxanna Tellez MD  Family Medicine PGY-3  [facility]

## 2023-09-22 NOTE — PATIENT INSTRUCTIONS
Thank you for coming in to see us today, Ms. Shi Trevino! It was a pleasure managing your health together.    Today in clinic, we discussed your pain with sex. This is most likely due to increased tightness/tone of the muscles of the pelvic floor; this best treatment for this is pelvic floor physical therapy. To schedule an appointment with Physical or Occupational Therapy, please call [phone]. We will make sure this is not due to infection, half of which are swabs and half of which are a blood test. It's important to use condoms to lower your risk for exposure to STIs in the future.    If we ordered bloodwork today, you can get this done at ANY  location and the results will come to me directly. The [facility] here at [facility] are walk-in (no appointment needed); [facility]'s hours are M-F 6:30a-6p and Sat 8a-12p.    If we placed a referral today for a specialist/procedure and you did not otherwise receive a phone number to schedule, please call the general scheduling line at [phone]. You may also schedule appointments on the [employer] edgardo.    If you have any questions/concerns, you can always use [employer] to message me directly. Or if you prefer, you can call the office at [phone]; if you leave a message, the office staff should translate this to a message in my inbox.    I'm looking forward to seeing you back in clinic in 1 month to discuss your general health.    Best,  Dr. Tellez

## 2023-09-23 LAB
CHLAMYDIA TRACH., AMPLIFIED: NEGATIVE
N. GONORRHEA, AMPLIFIED: NEGATIVE
TRICHOMONAS VAGINALIS: POSITIVE

## 2023-09-26 NOTE — TELEPHONE ENCOUNTER
Pt called requesting rx Boost chocolate to go to [facility] for delivery to home. Message sent to provider.

## 2023-09-27 ENCOUNTER — TELEPHONE (OUTPATIENT)
Dept: PRIMARY CARE | Facility: CLINIC | Age: 48
End: 2023-09-27
Payer: COMMERCIAL

## 2023-09-27 DIAGNOSIS — A59.9 TRICHOMONIASIS: Primary | ICD-10-CM

## 2023-09-28 ENCOUNTER — TELEPHONE (OUTPATIENT)
Dept: PRIMARY CARE | Facility: CLINIC | Age: 48
End: 2023-09-28
Payer: COMMERCIAL

## 2023-09-28 PROBLEM — B96.89 BACTERIAL VAGINOSIS: Status: RESOLVED | Noted: 2023-02-26 | Resolved: 2023-09-28

## 2023-09-28 PROBLEM — B35.3 TINEA PEDIS OF BOTH FEET: Status: RESOLVED | Noted: 2023-02-26 | Resolved: 2023-09-28

## 2023-09-28 PROBLEM — L85.3 DRY SKIN: Status: RESOLVED | Noted: 2023-02-26 | Resolved: 2023-09-28

## 2023-09-28 PROBLEM — N76.0 BACTERIAL VAGINOSIS: Status: RESOLVED | Noted: 2023-02-26 | Resolved: 2023-09-28

## 2023-09-28 PROBLEM — H52.223 MYOPIA OF BOTH EYES WITH REGULAR ASTIGMATISM: Status: RESOLVED | Noted: 2023-02-26 | Resolved: 2023-09-28

## 2023-09-28 PROBLEM — M62.89 HIGH-TONE PELVIC FLOOR DYSFUNCTION: Status: ACTIVE | Noted: 2023-09-28

## 2023-09-28 PROBLEM — H52.03 HYPERMETROPIA OF BOTH EYES: Status: RESOLVED | Noted: 2023-02-26 | Resolved: 2023-09-28

## 2023-09-28 PROBLEM — R06.09 DOE (DYSPNEA ON EXERTION): Status: RESOLVED | Noted: 2023-02-26 | Resolved: 2023-09-28

## 2023-09-28 PROBLEM — R30.0 DYSURIA: Status: RESOLVED | Noted: 2023-02-26 | Resolved: 2023-09-28

## 2023-09-28 PROBLEM — R10.84 GENERALIZED ABDOMINAL PAIN: Status: RESOLVED | Noted: 2023-02-26 | Resolved: 2023-09-28

## 2023-09-28 PROBLEM — E87.29 ALCOHOLIC KETOACIDOSIS: Status: RESOLVED | Noted: 2020-09-14 | Resolved: 2023-09-28

## 2023-09-28 PROBLEM — H52.13 MYOPIA OF BOTH EYES WITH REGULAR ASTIGMATISM: Status: RESOLVED | Noted: 2023-02-26 | Resolved: 2023-09-28

## 2023-09-28 PROBLEM — E87.20 METABOLIC ACIDOSIS, NORMAL ANION GAP (NAG): Status: RESOLVED | Noted: 2020-10-09 | Resolved: 2023-09-28

## 2023-09-28 PROBLEM — F10.10 ALCOHOL ABUSE: Status: RESOLVED | Noted: 2023-02-26 | Resolved: 2023-09-28

## 2023-09-28 PROBLEM — R00.2 HEART PALPITATIONS: Status: RESOLVED | Noted: 2023-02-26 | Resolved: 2023-09-28

## 2023-09-28 PROBLEM — R79.89 ABNORMAL LIVER FUNCTION TEST: Status: RESOLVED | Noted: 2023-02-26 | Resolved: 2023-09-28

## 2023-09-28 PROBLEM — K86.1 PANCREATITIS, CHRONIC (MULTI): Status: RESOLVED | Noted: 2023-02-26 | Resolved: 2023-09-28

## 2023-09-28 RX ORDER — METRONIDAZOLE 500 MG/1
500 TABLET ORAL 2 TIMES DAILY
Qty: 14 TABLET | Refills: 0 | Status: SHIPPED | OUTPATIENT
Start: 2023-09-28 | End: 2023-10-05

## 2023-09-28 NOTE — ASSESSMENT & PLAN NOTE
For pelvic floor PT. Most likely cause of insertional dyspareunia, although will also rule out infectious causes (sent testing today). Up to date on pap, cervix grossly normal on exam. No abnormal bleeding. Does not appear to have significant vaginal atrophy, recommended water based lubrication as needed for sexual activity.

## 2023-09-28 NOTE — TELEPHONE ENCOUNTER
Pt just called back and said you put in for Boost for her, but she needs you to send this order to [facility] so her insurance can accept it. Their number is [phone]. Thanks!

## 2023-09-28 NOTE — TELEPHONE ENCOUNTER
Recent Results (from the past 168 hour(s))   C. Trachomatis / N. Gonorrhoeae, Amplified Detection    Collection Time: 09/22/23 11:28 AM   Result Value Ref Range    Neisseria gonorrhea,Amplified NEGATIVE Negative    Chlamydia trachomatis, Amplified NEGATIVE Negative   Trichomonas Wet Prep Reflex to PCR    Collection Time: 09/22/23 11:28 AM   Result Value Ref Range    Trichomonas NONE SEEN Negative    Clue Cells NONE SEEN     Yeast NONE SEEN     WBC 1-2 /HPF    Trichomonas reflex comment SEE COMMENT    TRICH VAGINALIS, AMPLIFIED    Collection Time: 09/22/23 11:28 AM   Result Value Ref Range    Trichomonas Vaginalis POSITIVE (A) Negative      Called patient, spoke for 4min. Communicated positive trichomonas results, recommend treatment with 7 days of flagyl BID. Told her that it is impossible to know whether she got this infection from her recent attempted sexual encounter, or whether she is had this infection chronically for years now.  Either way, it warrants treatment, patient in agreement.  Told her she should notify her recent sexual partner and encouraged him to get treated as well, she stated she felt comfortable doing this.  Emphasized importance of both of them finish treatment before trying to have sex again, oral still just past the infection back and forth, patient voiced understanding.    Clarified with patient that she currently drinks 1-2 chocolate boosts per day to help with with maintenance in the setting of chronic pancreatitis and alcoholism.  Asking if she can get a prescription for this, told her I would submit one to Medicaid.    Patient asking when her next appointment is, confirmed date and time.  All questions answered, patient thankful for call.    Roxanna Tellez MD  Family Medicine PGY-3  [facility]

## 2023-10-04 NOTE — PROGRESS NOTES
I saw and evaluated the patient. I personally obtained the key and critical portions of the history and physical exam or was physically present for key and critical portions performed by the resident/fellow. I reviewed the resident/fellow's documentation and discussed the patient with the resident/fellow. I agree with the resident/fellow's medical decision making as documented in the note.    Michael Metz MD

## 2023-10-05 ENCOUNTER — APPOINTMENT (OUTPATIENT)
Dept: PRIMARY CARE | Facility: CLINIC | Age: 48
End: 2023-10-05
Payer: COMMERCIAL

## 2023-10-12 DIAGNOSIS — A59.9 TRICHOMONIASIS: ICD-10-CM

## 2023-10-13 RX ORDER — METRONIDAZOLE 500 MG/1
500 TABLET ORAL 2 TIMES DAILY
Qty: 14 TABLET | Refills: 10 | OUTPATIENT
Start: 2023-10-13 | End: 2023-10-20

## 2023-11-03 ENCOUNTER — TELEPHONE (OUTPATIENT)
Dept: PRIMARY CARE | Facility: CLINIC | Age: 48
End: 2023-11-03

## 2023-11-03 NOTE — TELEPHONE ENCOUNTER
Pt needs a refill on her chocolate Boost drinks. She is requesting you call her at [phone]. Thanks!

## 2023-11-21 ENCOUNTER — TELEPHONE (OUTPATIENT)
Dept: PRIMARY CARE | Facility: CLINIC | Age: 48
End: 2023-11-21
Payer: COMMERCIAL

## 2023-11-21 NOTE — TELEPHONE ENCOUNTER
Pt called and requested a refill on her Boost she states that she only has 3 bottles left and she never got a new prescription placed.

## 2023-11-29 DIAGNOSIS — A59.9 TRICHOMONIASIS: ICD-10-CM

## 2023-12-01 RX ORDER — METRONIDAZOLE 500 MG/1
500 TABLET ORAL 2 TIMES DAILY
Qty: 14 TABLET | Refills: 10 | OUTPATIENT
Start: 2023-12-01 | End: 2023-12-08

## 2023-12-08 DIAGNOSIS — J45.909 ASTHMA, UNSPECIFIED ASTHMA SEVERITY, UNSPECIFIED WHETHER COMPLICATED, UNSPECIFIED WHETHER PERSISTENT (HHS-HCC): Primary | ICD-10-CM

## 2023-12-08 RX ORDER — ALBUTEROL SULFATE 90 UG/1
1 AEROSOL, METERED RESPIRATORY (INHALATION) EVERY 4 HOURS PRN
Qty: 18 G | Refills: 3 | Status: SHIPPED | OUTPATIENT
Start: 2023-12-08

## 2023-12-21 ENCOUNTER — PATIENT OUTREACH (OUTPATIENT)
Dept: CARE COORDINATION | Facility: CLINIC | Age: 48
End: 2023-12-21
Payer: COMMERCIAL

## 2023-12-26 ENCOUNTER — HOSPITAL ENCOUNTER (INPATIENT)
Facility: HOSPITAL | Age: 48
LOS: 1 days | Discharge: HOME | End: 2023-12-28
Attending: STUDENT IN AN ORGANIZED HEALTH CARE EDUCATION/TRAINING PROGRAM | Admitting: INTERNAL MEDICINE
Payer: COMMERCIAL

## 2023-12-26 ENCOUNTER — APPOINTMENT (OUTPATIENT)
Dept: OPHTHALMOLOGY | Facility: CLINIC | Age: 48
End: 2023-12-26
Payer: COMMERCIAL

## 2023-12-26 DIAGNOSIS — K21.9 GASTROESOPHAGEAL REFLUX DISEASE WITHOUT ESOPHAGITIS: Primary | ICD-10-CM

## 2023-12-26 DIAGNOSIS — F10.10 ALCOHOL ABUSE: ICD-10-CM

## 2023-12-26 DIAGNOSIS — U07.1 COVID-19: Primary | ICD-10-CM

## 2023-12-26 DIAGNOSIS — G40.909 SEIZURE DISORDER (MULTI): ICD-10-CM

## 2023-12-26 DIAGNOSIS — G40.919 BREAKTHROUGH SEIZURE (MULTI): ICD-10-CM

## 2023-12-26 LAB
ANION GAP BLDV CALCULATED.4IONS-SCNC: 25 MMOL/L (ref 10–25)
BASE EXCESS BLDV CALC-SCNC: -12.2 MMOL/L (ref -2–3)
BODY TEMPERATURE: 37 DEGREES CELSIUS
CA-I BLDV-SCNC: 1.27 MMOL/L (ref 1.1–1.33)
CHLORIDE BLDV-SCNC: 103 MMOL/L (ref 98–107)
GLUCOSE BLDV-MCNC: 287 MG/DL (ref 74–99)
HCO3 BLDV-SCNC: 17.2 MMOL/L (ref 22–26)
HCT VFR BLD EST: 34 % (ref 36–46)
HGB BLDV-MCNC: 11.2 G/DL (ref 12–16)
LACTATE BLDV-SCNC: 12.2 MMOL/L (ref 0.4–2)
OXYHGB MFR BLDV: 65.3 % (ref 45–75)
PCO2 BLDV: 54 MM HG (ref 41–51)
PH BLDV: 7.11 PH (ref 7.33–7.43)
PO2 BLDV: 53 MM HG (ref 35–45)
POTASSIUM BLDV-SCNC: 4.4 MMOL/L (ref 3.5–5.3)
SAO2 % BLDV: 65 % (ref 45–75)
SODIUM BLDV-SCNC: 141 MMOL/L (ref 136–145)

## 2023-12-26 PROCEDURE — 96375 TX/PRO/DX INJ NEW DRUG ADDON: CPT

## 2023-12-26 PROCEDURE — 96365 THER/PROPH/DIAG IV INF INIT: CPT

## 2023-12-26 PROCEDURE — 99285 EMERGENCY DEPT VISIT HI MDM: CPT | Performed by: STUDENT IN AN ORGANIZED HEALTH CARE EDUCATION/TRAINING PROGRAM

## 2023-12-26 PROCEDURE — 96366 THER/PROPH/DIAG IV INF ADDON: CPT

## 2023-12-26 PROCEDURE — 84132 ASSAY OF SERUM POTASSIUM: CPT

## 2023-12-26 PROCEDURE — 96361 HYDRATE IV INFUSION ADD-ON: CPT

## 2023-12-26 PROCEDURE — 93010 ELECTROCARDIOGRAM REPORT: CPT | Performed by: STUDENT IN AN ORGANIZED HEALTH CARE EDUCATION/TRAINING PROGRAM

## 2023-12-26 RX ORDER — LEVETIRACETAM 10 MG/ML
INJECTION INTRAVASCULAR
Status: COMPLETED
Start: 2023-12-26 | End: 2023-12-27

## 2023-12-26 RX ORDER — PANTOPRAZOLE SODIUM 40 MG/1
40 TABLET, DELAYED RELEASE ORAL
COMMUNITY
Start: 2023-12-18 | End: 2023-12-26 | Stop reason: SDUPTHER

## 2023-12-26 RX ORDER — PANTOPRAZOLE SODIUM 40 MG/1
40 TABLET, DELAYED RELEASE ORAL
Qty: 30 TABLET | Refills: 0 | Status: SHIPPED | OUTPATIENT
Start: 2023-12-26 | End: 2024-01-25

## 2023-12-26 NOTE — Clinical Note
Is the patient on isolation?: Yes  Do you expect the patient to require telemetry (informational-only for bed management): Yes  Do you expect the patient to require observation or inpt? (informational-only for bed management): Inpatient

## 2023-12-27 ENCOUNTER — APPOINTMENT (OUTPATIENT)
Dept: RADIOLOGY | Facility: HOSPITAL | Age: 48
End: 2023-12-27
Payer: COMMERCIAL

## 2023-12-27 ENCOUNTER — ANCILLARY PROCEDURE (OUTPATIENT)
Dept: EMERGENCY MEDICINE | Facility: HOSPITAL | Age: 48
End: 2023-12-27
Payer: COMMERCIAL

## 2023-12-27 PROBLEM — U07.1 COVID-19: Status: ACTIVE | Noted: 2023-12-27

## 2023-12-27 LAB
ALBUMIN SERPL BCP-MCNC: 4.4 G/DL (ref 3.4–5)
ALP SERPL-CCNC: 114 U/L (ref 33–110)
ALT SERPL W P-5'-P-CCNC: 52 U/L (ref 7–45)
AMMONIA PLAS-SCNC: 77 UMOL/L (ref 16–53)
AMPHETAMINES UR QL SCN: ABNORMAL
ANION GAP BLDV CALCULATED.4IONS-SCNC: 12 MMOL/L (ref 10–25)
ANION GAP BLDV CALCULATED.4IONS-SCNC: 12 MMOL/L (ref 10–25)
ANION GAP SERPL CALC-SCNC: 25 MMOL/L (ref 10–20)
APAP SERPL-MCNC: <10 UG/ML
APPEARANCE UR: CLEAR
AST SERPL W P-5'-P-CCNC: 69 U/L (ref 9–39)
ATRIAL RATE: 97 BPM
BARBITURATES UR QL SCN: ABNORMAL
BASE EXCESS BLDV CALC-SCNC: -0.1 MMOL/L (ref -2–3)
BASE EXCESS BLDV CALC-SCNC: -0.5 MMOL/L (ref -2–3)
BASOPHILS # BLD AUTO: 0.08 X10*3/UL (ref 0–0.1)
BASOPHILS NFR BLD AUTO: 1 %
BENZODIAZ UR QL SCN: ABNORMAL
BILIRUB SERPL-MCNC: 0.4 MG/DL (ref 0–1.2)
BILIRUB UR STRIP.AUTO-MCNC: NEGATIVE MG/DL
BODY TEMPERATURE: 37 DEGREES CELSIUS
BODY TEMPERATURE: 37 DEGREES CELSIUS
BUN SERPL-MCNC: 15 MG/DL (ref 6–23)
BZE UR QL SCN: ABNORMAL
CA-I BLDV-SCNC: 1.17 MMOL/L (ref 1.1–1.33)
CA-I BLDV-SCNC: 1.17 MMOL/L (ref 1.1–1.33)
CALCIUM SERPL-MCNC: 9.8 MG/DL (ref 8.6–10.6)
CANNABINOIDS UR QL SCN: ABNORMAL
CHLORIDE BLDV-SCNC: 103 MMOL/L (ref 98–107)
CHLORIDE BLDV-SCNC: 107 MMOL/L (ref 98–107)
CHLORIDE SERPL-SCNC: 103 MMOL/L (ref 98–107)
CO2 SERPL-SCNC: 16 MMOL/L (ref 21–32)
COLOR UR: ABNORMAL
CREAT SERPL-MCNC: 0.82 MG/DL (ref 0.5–1.05)
EOSINOPHIL # BLD AUTO: 0.05 X10*3/UL (ref 0–0.7)
EOSINOPHIL NFR BLD AUTO: 0.6 %
ERYTHROCYTE [DISTWIDTH] IN BLOOD BY AUTOMATED COUNT: 14.1 % (ref 11.5–14.5)
ETHANOL SERPL-MCNC: <10 MG/DL
FENTANYL+NORFENTANYL UR QL SCN: ABNORMAL
FLUAV RNA RESP QL NAA+PROBE: NOT DETECTED
FLUBV RNA RESP QL NAA+PROBE: NOT DETECTED
GFR SERPL CREATININE-BSD FRML MDRD: 88 ML/MIN/1.73M*2
GLUCOSE BLD MANUAL STRIP-MCNC: 89 MG/DL (ref 74–99)
GLUCOSE BLDV-MCNC: 107 MG/DL (ref 74–99)
GLUCOSE BLDV-MCNC: 115 MG/DL (ref 74–99)
GLUCOSE SERPL-MCNC: 268 MG/DL (ref 74–99)
GLUCOSE UR STRIP.AUTO-MCNC: ABNORMAL MG/DL
HCO3 BLDV-SCNC: 23.8 MMOL/L (ref 22–26)
HCO3 BLDV-SCNC: 24.9 MMOL/L (ref 22–26)
HCT VFR BLD AUTO: 31.3 % (ref 36–46)
HCT VFR BLD EST: 30 % (ref 36–46)
HCT VFR BLD EST: 31 % (ref 36–46)
HGB BLD-MCNC: 11 G/DL (ref 12–16)
HGB BLDV-MCNC: 10 G/DL (ref 12–16)
HGB BLDV-MCNC: 10.2 G/DL (ref 12–16)
HOLD SPECIMEN: NORMAL
IMM GRANULOCYTES # BLD AUTO: 0.05 X10*3/UL (ref 0–0.7)
IMM GRANULOCYTES NFR BLD AUTO: 0.6 % (ref 0–0.9)
INHALED O2 CONCENTRATION: 0 %
KETONES UR STRIP.AUTO-MCNC: ABNORMAL MG/DL
LACTATE BLDV-SCNC: 1.2 MMOL/L (ref 0.4–2)
LACTATE BLDV-SCNC: 3 MMOL/L (ref 0.4–2)
LEUKOCYTE ESTERASE UR QL STRIP.AUTO: NEGATIVE
LEVETIRACETAM SERPL-MCNC: <2 UG/ML (ref 10–40)
LIPASE SERPL-CCNC: 14 U/L (ref 9–82)
LYMPHOCYTES # BLD AUTO: 1.1 X10*3/UL (ref 1.2–4.8)
LYMPHOCYTES NFR BLD AUTO: 13.5 %
MAGNESIUM SERPL-MCNC: 1.55 MG/DL (ref 1.6–2.4)
MCH RBC QN AUTO: 34.5 PG (ref 26–34)
MCHC RBC AUTO-ENTMCNC: 35.1 G/DL (ref 32–36)
MCV RBC AUTO: 98 FL (ref 80–100)
MONOCYTES # BLD AUTO: 0.46 X10*3/UL (ref 0.1–1)
MONOCYTES NFR BLD AUTO: 5.6 %
NEUTROPHILS # BLD AUTO: 6.43 X10*3/UL (ref 1.2–7.7)
NEUTROPHILS NFR BLD AUTO: 78.7 %
NITRITE UR QL STRIP.AUTO: NEGATIVE
NRBC BLD-RTO: 0.4 /100 WBCS (ref 0–0)
OPIATES UR QL SCN: ABNORMAL
OXYCODONE+OXYMORPHONE UR QL SCN: ABNORMAL
OXYHGB MFR BLDV: 19.4 % (ref 45–75)
OXYHGB MFR BLDV: 71.9 % (ref 45–75)
P AXIS: 86 DEGREES
P OFFSET: 191 MS
P ONSET: 145 MS
PCO2 BLDV: 35 MM HG (ref 41–51)
PCO2 BLDV: 43 MM HG (ref 41–51)
PCP UR QL SCN: ABNORMAL
PH BLDV: 7.37 PH (ref 7.33–7.43)
PH BLDV: 7.44 PH (ref 7.33–7.43)
PH UR STRIP.AUTO: 6 [PH]
PLATELET # BLD AUTO: 164 X10*3/UL (ref 150–450)
PO2 BLDV: 22 MM HG (ref 35–45)
PO2 BLDV: 48 MM HG (ref 35–45)
POTASSIUM BLDV-SCNC: 3.8 MMOL/L (ref 3.5–5.3)
POTASSIUM BLDV-SCNC: 4.5 MMOL/L (ref 3.5–5.3)
POTASSIUM SERPL-SCNC: 4.3 MMOL/L (ref 3.5–5.3)
PR INTERVAL: 162 MS
PROT SERPL-MCNC: 8.2 G/DL (ref 6.4–8.2)
PROT UR STRIP.AUTO-MCNC: ABNORMAL MG/DL
Q ONSET: 226 MS
QRS COUNT: 16 BEATS
QRS DURATION: 74 MS
QT INTERVAL: 380 MS
QTC CALCULATION(BAZETT): 482 MS
QTC FREDERICIA: 445 MS
R AXIS: 9 DEGREES
RBC # BLD AUTO: 3.19 X10*6/UL (ref 4–5.2)
RBC # UR STRIP.AUTO: NEGATIVE /UL
RBC #/AREA URNS AUTO: NORMAL /HPF
SALICYLATES SERPL-MCNC: <3 MG/DL
SAO2 % BLDV: 20 % (ref 45–75)
SAO2 % BLDV: 73 % (ref 45–75)
SARS-COV-2 RNA RESP QL NAA+PROBE: DETECTED
SODIUM BLDV-SCNC: 135 MMOL/L (ref 136–145)
SODIUM BLDV-SCNC: 139 MMOL/L (ref 136–145)
SODIUM SERPL-SCNC: 140 MMOL/L (ref 136–145)
SP GR UR STRIP.AUTO: 1.01
SQUAMOUS #/AREA URNS AUTO: NORMAL /HPF
T AXIS: 79 DEGREES
T OFFSET: 416 MS
UROBILINOGEN UR STRIP.AUTO-MCNC: <2 MG/DL
VENTRICULAR RATE: 97 BPM
WBC # BLD AUTO: 8.2 X10*3/UL (ref 4.4–11.3)
WBC #/AREA URNS AUTO: NORMAL /HPF

## 2023-12-27 PROCEDURE — 93005 ELECTROCARDIOGRAM TRACING: CPT

## 2023-12-27 PROCEDURE — 2500000004 HC RX 250 GENERAL PHARMACY W/ HCPCS (ALT 636 FOR OP/ED): Mod: SE | Performed by: STUDENT IN AN ORGANIZED HEALTH CARE EDUCATION/TRAINING PROGRAM

## 2023-12-27 PROCEDURE — 2500000004 HC RX 250 GENERAL PHARMACY W/ HCPCS (ALT 636 FOR OP/ED): Mod: SE | Performed by: PHYSICIAN ASSISTANT

## 2023-12-27 PROCEDURE — 36415 COLL VENOUS BLD VENIPUNCTURE: CPT | Performed by: STUDENT IN AN ORGANIZED HEALTH CARE EDUCATION/TRAINING PROGRAM

## 2023-12-27 PROCEDURE — 70450 CT HEAD/BRAIN W/O DYE: CPT

## 2023-12-27 PROCEDURE — 84132 ASSAY OF SERUM POTASSIUM: CPT

## 2023-12-27 PROCEDURE — 82140 ASSAY OF AMMONIA: CPT | Performed by: STUDENT IN AN ORGANIZED HEALTH CARE EDUCATION/TRAINING PROGRAM

## 2023-12-27 PROCEDURE — 2500000001 HC RX 250 WO HCPCS SELF ADMINISTERED DRUGS (ALT 637 FOR MEDICARE OP): Performed by: STUDENT IN AN ORGANIZED HEALTH CARE EDUCATION/TRAINING PROGRAM

## 2023-12-27 PROCEDURE — 99223 1ST HOSP IP/OBS HIGH 75: CPT

## 2023-12-27 PROCEDURE — 70450 CT HEAD/BRAIN W/O DYE: CPT | Performed by: RADIOLOGY

## 2023-12-27 PROCEDURE — 1100000001 HC PRIVATE ROOM DAILY

## 2023-12-27 PROCEDURE — 87636 SARSCOV2 & INF A&B AMP PRB: CPT | Performed by: STUDENT IN AN ORGANIZED HEALTH CARE EDUCATION/TRAINING PROGRAM

## 2023-12-27 PROCEDURE — 85025 COMPLETE CBC W/AUTO DIFF WBC: CPT | Performed by: STUDENT IN AN ORGANIZED HEALTH CARE EDUCATION/TRAINING PROGRAM

## 2023-12-27 PROCEDURE — 83735 ASSAY OF MAGNESIUM: CPT | Performed by: STUDENT IN AN ORGANIZED HEALTH CARE EDUCATION/TRAINING PROGRAM

## 2023-12-27 PROCEDURE — 71045 X-RAY EXAM CHEST 1 VIEW: CPT | Performed by: RADIOLOGY

## 2023-12-27 PROCEDURE — 82947 ASSAY GLUCOSE BLOOD QUANT: CPT

## 2023-12-27 PROCEDURE — 2500000001 HC RX 250 WO HCPCS SELF ADMINISTERED DRUGS (ALT 637 FOR MEDICARE OP): Mod: SE | Performed by: PHYSICIAN ASSISTANT

## 2023-12-27 PROCEDURE — 80307 DRUG TEST PRSMV CHEM ANLYZR: CPT | Performed by: STUDENT IN AN ORGANIZED HEALTH CARE EDUCATION/TRAINING PROGRAM

## 2023-12-27 PROCEDURE — 2500000004 HC RX 250 GENERAL PHARMACY W/ HCPCS (ALT 636 FOR OP/ED): Mod: SE

## 2023-12-27 PROCEDURE — 81001 URINALYSIS AUTO W/SCOPE: CPT | Performed by: STUDENT IN AN ORGANIZED HEALTH CARE EDUCATION/TRAINING PROGRAM

## 2023-12-27 PROCEDURE — 80179 DRUG ASSAY SALICYLATE: CPT | Performed by: STUDENT IN AN ORGANIZED HEALTH CARE EDUCATION/TRAINING PROGRAM

## 2023-12-27 PROCEDURE — 71045 X-RAY EXAM CHEST 1 VIEW: CPT

## 2023-12-27 PROCEDURE — 99223 1ST HOSP IP/OBS HIGH 75: CPT | Performed by: STUDENT IN AN ORGANIZED HEALTH CARE EDUCATION/TRAINING PROGRAM

## 2023-12-27 PROCEDURE — 83690 ASSAY OF LIPASE: CPT | Performed by: STUDENT IN AN ORGANIZED HEALTH CARE EDUCATION/TRAINING PROGRAM

## 2023-12-27 PROCEDURE — 80053 COMPREHEN METABOLIC PANEL: CPT | Performed by: STUDENT IN AN ORGANIZED HEALTH CARE EDUCATION/TRAINING PROGRAM

## 2023-12-27 PROCEDURE — 80177 DRUG SCRN QUAN LEVETIRACETAM: CPT | Performed by: STUDENT IN AN ORGANIZED HEALTH CARE EDUCATION/TRAINING PROGRAM

## 2023-12-27 PROCEDURE — 84132 ASSAY OF SERUM POTASSIUM: CPT | Performed by: STUDENT IN AN ORGANIZED HEALTH CARE EDUCATION/TRAINING PROGRAM

## 2023-12-27 PROCEDURE — 36415 COLL VENOUS BLD VENIPUNCTURE: CPT

## 2023-12-27 RX ORDER — LEVETIRACETAM 10 MG/ML
1000 INJECTION INTRAVASCULAR ONCE
Status: COMPLETED | OUTPATIENT
Start: 2023-12-27 | End: 2023-12-27

## 2023-12-27 RX ORDER — LORAZEPAM 2 MG/ML
1 INJECTION INTRAMUSCULAR EVERY 2 HOUR PRN
Status: DISCONTINUED | OUTPATIENT
Start: 2023-12-27 | End: 2023-12-28 | Stop reason: HOSPADM

## 2023-12-27 RX ORDER — LEVETIRACETAM 500 MG/1
1000 TABLET ORAL 2 TIMES DAILY
Status: DISCONTINUED | OUTPATIENT
Start: 2023-12-27 | End: 2023-12-27

## 2023-12-27 RX ORDER — ACETAMINOPHEN 325 MG/1
975 TABLET ORAL ONCE
Status: DISCONTINUED | OUTPATIENT
Start: 2023-12-27 | End: 2023-12-28 | Stop reason: HOSPADM

## 2023-12-27 RX ORDER — MULTIVIT-MIN/IRON FUM/FOLIC AC 7.5 MG-4
1 TABLET ORAL DAILY
Status: DISCONTINUED | OUTPATIENT
Start: 2023-12-27 | End: 2023-12-28 | Stop reason: HOSPADM

## 2023-12-27 RX ORDER — LORAZEPAM 2 MG/ML
0.5 INJECTION INTRAMUSCULAR EVERY 2 HOUR PRN
Status: DISCONTINUED | OUTPATIENT
Start: 2023-12-27 | End: 2023-12-28 | Stop reason: HOSPADM

## 2023-12-27 RX ORDER — DEXTROSE 50 % IN WATER (D50W) INTRAVENOUS SYRINGE
25
Status: DISCONTINUED | OUTPATIENT
Start: 2023-12-27 | End: 2023-12-28 | Stop reason: HOSPADM

## 2023-12-27 RX ORDER — THIAMINE HYDROCHLORIDE 100 MG/ML
100 INJECTION, SOLUTION INTRAMUSCULAR; INTRAVENOUS DAILY
Status: DISCONTINUED | OUTPATIENT
Start: 2023-12-27 | End: 2023-12-28 | Stop reason: HOSPADM

## 2023-12-27 RX ORDER — LORAZEPAM 2 MG/ML
2 INJECTION INTRAMUSCULAR EVERY 2 HOUR PRN
Status: DISCONTINUED | OUTPATIENT
Start: 2023-12-27 | End: 2023-12-28 | Stop reason: HOSPADM

## 2023-12-27 RX ORDER — ONDANSETRON HYDROCHLORIDE 2 MG/ML
INJECTION, SOLUTION INTRAVENOUS
Status: COMPLETED
Start: 2023-12-27 | End: 2023-12-27

## 2023-12-27 RX ORDER — LEVETIRACETAM 500 MG/1
1000 TABLET ORAL EVERY 12 HOURS
Status: DISCONTINUED | OUTPATIENT
Start: 2023-12-27 | End: 2023-12-28 | Stop reason: HOSPADM

## 2023-12-27 RX ORDER — FOLIC ACID 1 MG/1
1 TABLET ORAL DAILY
Status: DISCONTINUED | OUTPATIENT
Start: 2023-12-27 | End: 2023-12-28 | Stop reason: HOSPADM

## 2023-12-27 RX ORDER — LANOLIN ALCOHOL/MO/W.PET/CERES
100 CREAM (GRAM) TOPICAL DAILY
Status: DISCONTINUED | OUTPATIENT
Start: 2023-12-30 | End: 2023-12-28 | Stop reason: HOSPADM

## 2023-12-27 RX ORDER — ONDANSETRON HYDROCHLORIDE 2 MG/ML
4 INJECTION, SOLUTION INTRAVENOUS ONCE
Status: COMPLETED | OUTPATIENT
Start: 2023-12-27 | End: 2023-12-27

## 2023-12-27 RX ORDER — KETOROLAC TROMETHAMINE 15 MG/ML
15 INJECTION, SOLUTION INTRAMUSCULAR; INTRAVENOUS ONCE
Status: COMPLETED | OUTPATIENT
Start: 2023-12-27 | End: 2023-12-27

## 2023-12-27 RX ORDER — NAPROXEN SODIUM 220 MG/1
81 TABLET, FILM COATED ORAL DAILY
Status: DISCONTINUED | OUTPATIENT
Start: 2023-12-27 | End: 2023-12-28 | Stop reason: HOSPADM

## 2023-12-27 RX ORDER — INSULIN LISPRO 100 [IU]/ML
0-5 INJECTION, SOLUTION INTRAVENOUS; SUBCUTANEOUS
Status: DISCONTINUED | OUTPATIENT
Start: 2023-12-27 | End: 2023-12-28 | Stop reason: HOSPADM

## 2023-12-27 RX ORDER — DEXTROSE MONOHYDRATE 100 MG/ML
0.3 INJECTION, SOLUTION INTRAVENOUS ONCE AS NEEDED
Status: DISCONTINUED | OUTPATIENT
Start: 2023-12-27 | End: 2023-12-28 | Stop reason: HOSPADM

## 2023-12-27 RX ORDER — MAGNESIUM SULFATE HEPTAHYDRATE 40 MG/ML
2 INJECTION, SOLUTION INTRAVENOUS ONCE
Status: COMPLETED | OUTPATIENT
Start: 2023-12-27 | End: 2023-12-27

## 2023-12-27 RX ORDER — METOCLOPRAMIDE HYDROCHLORIDE 5 MG/ML
10 INJECTION INTRAMUSCULAR; INTRAVENOUS ONCE
Status: COMPLETED | OUTPATIENT
Start: 2023-12-27 | End: 2023-12-27

## 2023-12-27 RX ORDER — LEVETIRACETAM 10 MG/ML
1000 INJECTION INTRAVASCULAR EVERY 12 HOURS
Status: DISCONTINUED | OUTPATIENT
Start: 2023-12-27 | End: 2023-12-28 | Stop reason: HOSPADM

## 2023-12-27 RX ORDER — ENOXAPARIN SODIUM 100 MG/ML
40 INJECTION SUBCUTANEOUS EVERY 24 HOURS
Status: DISCONTINUED | OUTPATIENT
Start: 2023-12-27 | End: 2023-12-28 | Stop reason: HOSPADM

## 2023-12-27 RX ADMIN — LEVETIRACETAM 1000 MG: 10 INJECTION INTRAVENOUS at 00:05

## 2023-12-27 RX ADMIN — Medication 1 TABLET: at 09:16

## 2023-12-27 RX ADMIN — ONDANSETRON 4 MG: 2 INJECTION, SOLUTION INTRAMUSCULAR; INTRAVENOUS at 01:05

## 2023-12-27 RX ADMIN — ONDANSETRON HYDROCHLORIDE 4 MG: 2 INJECTION, SOLUTION INTRAVENOUS at 01:05

## 2023-12-27 RX ADMIN — SODIUM CHLORIDE 1000 ML: 0.9 INJECTION, SOLUTION INTRAVENOUS at 00:00

## 2023-12-27 RX ADMIN — FOLIC ACID 1 MG: 1 TABLET ORAL at 09:16

## 2023-12-27 RX ADMIN — LORAZEPAM 1 MG: 2 INJECTION INTRAMUSCULAR; INTRAVENOUS at 15:56

## 2023-12-27 RX ADMIN — KETOROLAC TROMETHAMINE 15 MG: 15 INJECTION, SOLUTION INTRAMUSCULAR; INTRAVENOUS at 05:04

## 2023-12-27 RX ADMIN — METOCLOPRAMIDE 10 MG: 5 INJECTION, SOLUTION INTRAMUSCULAR; INTRAVENOUS at 05:04

## 2023-12-27 RX ADMIN — MAGNESIUM SULFATE HEPTAHYDRATE 2 G: 40 INJECTION, SOLUTION INTRAVENOUS at 05:04

## 2023-12-27 RX ADMIN — LEVETIRACETAM 1000 MG: 500 TABLET, FILM COATED ORAL at 21:41

## 2023-12-27 RX ADMIN — LEVETIRACETAM 1000 MG: 10 INJECTION INTRAVENOUS at 00:02

## 2023-12-27 RX ADMIN — LEVETIRACETAM 1000 MG: 10 INJECTION INTRAVASCULAR at 00:02

## 2023-12-27 RX ADMIN — THIAMINE HYDROCHLORIDE 100 MG: 100 INJECTION, SOLUTION INTRAMUSCULAR; INTRAVENOUS at 09:16

## 2023-12-27 RX ADMIN — LORAZEPAM 2 MG: 2 INJECTION INTRAMUSCULAR; INTRAVENOUS at 09:16

## 2023-12-27 ASSESSMENT — LIFESTYLE VARIABLES
AGITATION: NORMAL ACTIVITY
AGITATION: NORMAL ACTIVITY
PAROXYSMAL SWEATS: NO SWEAT VISIBLE
TACTILE DISTURBANCES: VERY MILD ITCHING, PINS AND NEEDLES, BURNING OR NUMBNESS
TREMOR: NO TREMOR
ORIENTATION AND CLOUDING OF SENSORIUM: CANNOT DO SERIAL ADDITIONS OR IS UNCERTAIN ABOUT DATE
BLOOD PRESSURE: 144/89
VISUAL DISTURBANCES: NOT PRESENT
TREMOR: NO TREMOR
TOTAL SCORE: 2
AGITATION: NORMAL ACTIVITY
PULSE: 101
BLOOD PRESSURE: 122/80
AGITATION: NORMAL ACTIVITY
VISUAL DISTURBANCES: NOT PRESENT
ANXIETY: MODERATELY ANXIOUS, OR GUARDED, SO ANXIETY IS INFERRED
NAUSEA AND VOMITING: MILD NAUSEA WITH NO VOMITING
HEADACHE, FULLNESS IN HEAD: SEVERE
PAROXYSMAL SWEATS: NO SWEAT VISIBLE
PULSE: 99
EVER HAD A DRINK FIRST THING IN THE MORNING TO STEADY YOUR NERVES TO GET RID OF A HANGOVER: NO
ANXIETY: NO ANXIETY, AT EASE
TOTAL SCORE: 5
HEADACHE, FULLNESS IN HEAD: NOT PRESENT
NAUSEA AND VOMITING: MILD NAUSEA WITH NO VOMITING
PAROXYSMAL SWEATS: NO SWEAT VISIBLE
HEADACHE, FULLNESS IN HEAD: NOT PRESENT
PAROXYSMAL SWEATS: BARELY PERCEPTIBLE SWEATING, PALMS MOIST
AUDITORY DISTURBANCES: NOT PRESENT
ANXIETY: NO ANXIETY, AT EASE
AUDITORY DISTURBANCES: NOT PRESENT
AUDITORY DISTURBANCES: MILD HARSHNESS OR ABILITY TO FRIGHTEN
ANXIETY: NO ANXIETY, AT EASE
VISUAL DISTURBANCES: NOT PRESENT
TOTAL SCORE: 2
AGITATION: NORMAL ACTIVITY
ANXIETY: NO ANXIETY, AT EASE
TOTAL SCORE: 14
TREMOR: NOT VISIBLE, BUT CAN BE FELT FINGERTIP TO FINGERTIP
ORIENTATION AND CLOUDING OF SENSORIUM: ORIENTED AND CAN DO SERIAL ADDITIONS
ORIENTATION AND CLOUDING OF SENSORIUM: ORIENTED AND CAN DO SERIAL ADDITIONS
AUDITORY DISTURBANCES: NOT PRESENT
TOTAL SCORE: 9
AUDITORY DISTURBANCES: MILD HARSHNESS OR ABILITY TO FRIGHTEN
BLOOD PRESSURE: 120/79
REASON UNABLE TO ASSESS: NO
ANXIETY: MILDLY ANXIOUS
HEADACHE, FULLNESS IN HEAD: MILD
NAUSEA AND VOMITING: 2
TREMOR: 2
TREMOR: NO TREMOR
TOTAL SCORE: 2
HAVE YOU EVER FELT YOU SHOULD CUT DOWN ON YOUR DRINKING: NO
VISUAL DISTURBANCES: NOT PRESENT
TREMOR: NO TREMOR
ORIENTATION AND CLOUDING OF SENSORIUM: ORIENTED AND CAN DO SERIAL ADDITIONS
TACTILE DISTURBANCES: VERY MILD ITCHING, PINS AND NEEDLES, BURNING OR NUMBNESS
HEADACHE, FULLNESS IN HEAD: MODERATELY SEVERE
BLOOD PRESSURE: 125/89
PAROXYSMAL SWEATS: NO SWEAT VISIBLE
PAROXYSMAL SWEATS: NO SWEAT VISIBLE
NAUSEA AND VOMITING: NO NAUSEA AND NO VOMITING
TREMOR: NO TREMOR
ORIENTATION AND CLOUDING OF SENSORIUM: CANNOT DO SERIAL ADDITIONS OR IS UNCERTAIN ABOUT DATE
HEADACHE, FULLNESS IN HEAD: NOT PRESENT
EVER FELT BAD OR GUILTY ABOUT YOUR DRINKING: NO
VISUAL DISTURBANCES: NOT PRESENT
ORIENTATION AND CLOUDING OF SENSORIUM: ORIENTED AND CAN DO SERIAL ADDITIONS
NAUSEA AND VOMITING: NO NAUSEA AND NO VOMITING
HAVE PEOPLE ANNOYED YOU BY CRITICIZING YOUR DRINKING: NO
VISUAL DISTURBANCES: NOT PRESENT
ORIENTATION AND CLOUDING OF SENSORIUM: CANNOT DO SERIAL ADDITIONS OR IS UNCERTAIN ABOUT DATE
BLOOD PRESSURE: 122/75
AGITATION: SOMEWHAT MORE THAN NORMAL ACTIVITY
PAROXYSMAL SWEATS: BARELY PERCEPTIBLE SWEATING, PALMS MOIST
AGITATION: NORMAL ACTIVITY
TOTAL SCORE: 1
AUDITORY DISTURBANCES: NOT PRESENT
VISUAL DISTURBANCES: NOT PRESENT
NAUSEA AND VOMITING: NO NAUSEA AND NO VOMITING
ANXIETY: NO ANXIETY, AT EASE
PULSE: 99
AUDITORY DISTURBANCES: NOT PRESENT
NAUSEA AND VOMITING: NO NAUSEA AND NO VOMITING
HEADACHE, FULLNESS IN HEAD: NOT PRESENT

## 2023-12-27 ASSESSMENT — ACTIVITIES OF DAILY LIVING (ADL)
HEARING - RIGHT EAR: FUNCTIONAL
WALKS IN HOME: INDEPENDENT
HEARING - LEFT EAR: FUNCTIONAL
ADEQUATE_TO_COMPLETE_ADL: YES
GROOMING: INDEPENDENT
FEEDING YOURSELF: INDEPENDENT
PATIENT'S MEMORY ADEQUATE TO SAFELY COMPLETE DAILY ACTIVITIES?: YES
DRESSING YOURSELF: INDEPENDENT
JUDGMENT_ADEQUATE_SAFELY_COMPLETE_DAILY_ACTIVITIES: YES
BATHING: INDEPENDENT
TOILETING: NEEDS ASSISTANCE

## 2023-12-27 ASSESSMENT — COGNITIVE AND FUNCTIONAL STATUS - GENERAL
TOILETING: A LITTLE
CLIMB 3 TO 5 STEPS WITH RAILING: A LITTLE
WALKING IN HOSPITAL ROOM: A LITTLE
DAILY ACTIVITIY SCORE: 23
MOBILITY SCORE: 22

## 2023-12-27 ASSESSMENT — PAIN SCALES - GENERAL
PAINLEVEL_OUTOF10: 0 - NO PAIN

## 2023-12-27 ASSESSMENT — PAIN - FUNCTIONAL ASSESSMENT: PAIN_FUNCTIONAL_ASSESSMENT: 0-10

## 2023-12-27 NOTE — PROGRESS NOTES
Emergency Medicine Transition of Care Note.    I received Shi Trevino in signout from Dr. Clark.  Please see the previous ED provider note for all HPI, PE and MDM up to the time of signout at 0700. This is in addition to the primary record.    In brief Shi Trevino is an 48 y.o. female presenting for   Chief Complaint   Patient presents with    Seizures     At the time of signout we were awaiting: CTH and neurology recommendations.  Neuro reports to admit to epilepsy service under Dr. Rodriguez.  CT head to be obtained prior to going to the floor. On droplet plus precautions for COVID POSITIVE status. CTH negative for acute findings.     After speaking with senior on epilepsy service, there is larger concern that the patient is having acute alcohol withdrawal.  Reportedly drinks approximately 4-6 drinks per day, alcohol on admission was undetectable.  The patient is started on CIWA protocol, IV lorazepam.  She received 2 mg of IV lorazepam per CIWA 14, and recheck CIWA of 2.  No breakthrough seizures here in the emergency department.  Admitted to medicine.    Diagnostics     Labs Reviewed   CBC WITH AUTO DIFFERENTIAL - Abnormal       Result Value    WBC 8.2      nRBC 0.4 (*)     RBC 3.19 (*)     Hemoglobin 11.0 (*)     Hematocrit 31.3 (*)     MCV 98      MCH 34.5 (*)     MCHC 35.1      RDW 14.1      Platelets 164      Neutrophils % 78.7      Immature Granulocytes %, Automated 0.6      Lymphocytes % 13.5      Monocytes % 5.6      Eosinophils % 0.6      Basophils % 1.0      Neutrophils Absolute 6.43      Immature Granulocytes Absolute, Automated 0.05      Lymphocytes Absolute 1.10 (*)     Monocytes Absolute 0.46      Eosinophils Absolute 0.05      Basophils Absolute 0.08     COMPREHENSIVE METABOLIC PANEL - Abnormal    Glucose 268 (*)     Sodium 140      Potassium 4.3      Chloride 103      Bicarbonate 16 (*)     Anion Gap 25 (*)     Urea Nitrogen 15      Creatinine 0.82      eGFR 88      Calcium 9.8      Albumin 4.4       Alkaline Phosphatase 114 (*)     Total Protein 8.2      AST 69 (*)     Bilirubin, Total 0.4      ALT 52 (*)    MAGNESIUM - Abnormal    Magnesium 1.55 (*)    AMMONIA - Abnormal    Ammonia 77 (*)    URINALYSIS WITH REFLEX MICROSCOPIC - Abnormal    Color, Urine Straw      Appearance, Urine Clear      Specific Gravity, Urine 1.013      pH, Urine 6.0      Protein, Urine 30 (1+) (*)     Glucose, Urine 50 (1+) (*)     Blood, Urine NEGATIVE      Ketones, Urine 5 (TRACE) (*)     Bilirubin, Urine NEGATIVE      Urobilinogen, Urine <2.0      Nitrite, Urine NEGATIVE      Leukocyte Esterase, Urine NEGATIVE     DRUG SCREEN,URINE - Abnormal    Amphetamine Screen, Urine Presumptive Negative      Barbiturate Screen, Urine Presumptive Negative      Benzodiazepines Screen, Urine Presumptive Negative      Cannabinoid Screen, Urine Presumptive Positive (*)     Cocaine Metabolite Screen, Urine Presumptive Negative      Fentanyl Screen, Urine Presumptive Negative      Opiate Screen, Urine Presumptive Negative      Oxycodone Screen, Urine Presumptive Negative      PCP Screen, Urine Presumptive Negative      Narrative:     Drug screen results are presumptive and should not be used to assess   compliance with prescribed medication. Contact the performing [facility] laboratory   to add-on definitive confirmatory testing if clinically indicated.    Toxicology screening results are reported qualitatively. The concentration must   be greater than or equal to the cutoff to be reported as positive. The concentration   at which the screening test can detect an individual drug or metabolite varies.   The absence of expected drug(s) and/or drug metabolite(s) may indicate non-compliance,   inappropriate timing of specimen collection relative to drug administration, poor drug   absorption, diluted/adulterated urine, or limitations of testing. For medical purposes   only; not valid for forensic use.    Interpretive questions should be directed to the  laboratory medical directors.   LEVETIRACETAM - Abnormal    Keppra <2 (*)     Narrative:     Brivaracetam may falsely increase the amount of Levetiracetam measured by this method. Serum levels should be confirmed by a valid chromatographic method  for patients with these drugs co-present in circulation.   SARS-COV-2 AND INFLUENZA A/B PCR - Abnormal    Flu A Result Not Detected      Flu B Result Not Detected      Coronavirus 2019, PCR Detected (*)     Narrative:     This assay has received FDA Emergency Use Authorization (EUA) and  is only authorized for the duration of time that circumstances exist to justify the authorization of the emergency use of in vitro diagnostic tests for the detection of SARS-CoV-2 virus and/or diagnosis of COVID-19 infection under section 564(b)(1) of the Act, 21 U.S.C. 360bbb-3(b)(1). Testing for SARS-CoV-2 is only recommended for patients who meet current clinical and/or epidemiological criteria as defined by federal, state, or local public health directives. This assay is an in vitro diagnostic nucleic acid amplification test for the qualitative detection of SARS-CoV-2, Influenza A, and Influenza B from nasopharyngeal specimens and has been validated for use at [facility]. Negative results do not preclude COVID-19 infections or Influenza A/B infections, and should not be used as the sole basis for diagnosis, treatment, or other management decisions. If Influenza A/B and RSV PCR results are negative, testing for Parainfluenza virus, Adenovirus and Metapneumovirus is routinely performed for [facility] pediatric oncology and intensive care inpatients, and is available on other patients by placing an add-on request.    BLOOD GAS VENOUS FULL PANEL - Abnormal    POCT pH, Venous 7.44 (*)     POCT pCO2, Venous 35 (*)     POCT pO2, Venous 48 (*)     POCT SO2, Venous 73      POCT Oxy Hemoglobin, Venous 71.9      POCT Hematocrit Calculated, Venous 31.0 (*)     POCT Sodium, Venous  135 (*)     POCT Potassium, Venous 3.8      POCT Chloride, Venous 103      POCT Ionized Calicum, Venous 1.17      POCT Glucose, Venous 107 (*)     POCT Lactate, Venous 1.2      POCT Base Excess, Venous -0.1      POCT HCO3 Calculated, Venous 23.8      POCT Hemoglobin, Venous 10.2 (*)     POCT Anion Gap, Venous 12.0      Patient Temperature 37.0      FiO2 0     BLOOD GAS VENOUS FULL PANEL UNSOLICITED - Abnormal    POCT pH, Venous 7.11 (*)     POCT pCO2, Venous 54 (*)     POCT pO2, Venous 53 (*)     POCT SO2, Venous 65      POCT Oxy Hemoglobin, Venous 65.3      POCT Hematocrit Calculated, Venous 34.0 (*)     POCT Sodium, Venous 141      POCT Potassium, Venous 4.4      POCT Chloride, Venous 103      POCT Ionized Calicum, Venous 1.27      POCT Glucose, Venous 287 (*)     POCT Lactate, Venous 12.2 (*)     POCT Base Excess, Venous -12.2 (*)     POCT HCO3 Calculated, Venous 17.2 (*)     POCT Hemoglobin, Venous 11.2 (*)     POCT Anion Gap, Venous 25.0      Patient Temperature 37.0     LIPASE - Normal    Lipase 14      Narrative:     Venipuncture immediately after or during the administration of Metamizole may lead to falsely low results. Testing should be performed immediately prior to Metamizole dosing.   ACUTE TOXICOLOGY PANEL, BLOOD - Normal    Acetaminophen <10.0      Salicylate  <3      Alcohol <10     URINE GRAY TUBE    Extra Tube Hold for add-ons.     MICROSCOPIC ONLY, URINE    WBC, Urine NONE      RBC, Urine 1-2      Squamous Epithelial Cells, Urine 1-9 (SPARSE)     BLOOD GAS LACTIC ACID, VENOUS       CT head wo IV contrast   Final Result   Similar appearance of encephalomalacia involving the left frontal and   bilateral parietal lobes as well as lacunar infarct of the left   cerebellar hemisphere. No evidence of acute intracranial abnormality.   If concerns for an acute infarct, may consider MRI brain for further   evaluation.        I personally reviewed the images/study and I agree with the findings   as stated by  resident physician Dr. Ronald Couch . This study   was interpreted at [place], [place].        MACRO:   None        Signed by: Inocente Davies 12/27/2023 11:40 AM   Dictation workstation:   [id number]      XR chest 1 view   Final Result   1.  No evidence of acute cardiopulmonary process.        I personally reviewed the images/study and I agree with Rose Lazar DO's (radiology resident) findings as stated. This study   was interpreted at [place], [place].        MACRO:   None        Signed by: Evan Finkelstein 12/27/2023 12:53 AM   Dictation workstation:   [id number]            ED Course as of 12/27/23 1340   Wed Dec 27, 2023   0104 Levetiracetam [EG]   0105 Levetiracetam [EG]      ED Course User Index  [EG] Vivian Clark MD         Diagnoses as of 12/27/23 1340   COVID-19   Breakthrough seizure (CMS/HCC)   Alcohol abuse       Final diagnoses:   [U07.1] COVID-19   [G40.919] Breakthrough seizure (CMS/HCC)   [F10.10] Alcohol abuse         Christi Carrillo PA-C

## 2023-12-27 NOTE — CONSULTS
EPILEPSY CONSULT NOTE   Epilepsy [id number]     Chief Complaint: Seizures    History of Present Illness: Shi Trevino is a 48 y.o. 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  Neurology consulted for seizure management.    In the ED, Keppra level is undetectable, alcohol level undetectable, urine tox screen positive for cannabinoids, NH4 77, WBC 8.2, UA without sign of infection, and magnesium 1.55.  Patient was loaded with Keppra 2 g (20 mg/kg).      Per discussion with patient's friend (Helen [phone]), was at friend's house from 10pm - 11pm. Per friend, patient came around noon 12/26 and fell asleep. Reportedly seemed really tired, didn't drink or eat anything. Woke up at 7pm, urinated and went back to sleep. At 10pm, friend noted that patient was on her side shivering (like patient is cold), arms and legs shivering, arms closed, not talking, no head deviation. This lasted 10 seconds. Then shivering stopped and patient went back to baseline over 10 minutes. Then had another 10 second episode, back to baseline over 10 minutes, then had another episode of shivering for 10 seconds. At some time between 7pm and 10pm, patient went down to a different apartment to see some friends but patient unable to provide us further details about this. Patient's friend said her typical drinking is 4-6 cups of liquor daily. Patient denies any recreational drug use.    Per last epilepsy note from 4/20/2023 patient is on Keppra 1000 mg twice daily.  She has a history that she was previously admitted to EMU in 2016 but left AMA due to alcohol withdrawal symptoms despite CIWA protocol in place.  Previous  routine EEG was unremarkable.    ROS: All systems reviewed and were negative except as above    Home Medications:    Keppra 1000 mg BID  Amlodipine 5 mg daily  Folic acid 1 mg daily  Multivitamin 1 tablet daily  Rosuvastatin 40 mg daily  Aspirin 81 mg daily  Thiamine 100 mg daily  Lipase-protease-amylase capsule 3 times daily  MiraLAX  Senokot  Disulfiram daily    Past Medical History:    has a past medical history of Alcohol dependence, in remission (CMS/[facility]), Alcoholic ketoacidosis (09/14/2020), Bacterial vaginosis (02/26/2023), Encounter for follow-up examination after completed treatment for conditions other than malignant neoplasm (01/24/2019), Encounter for screening for infections with a predominantly sexual mode of transmission (07/02/2021), Encounter for screening for malignant neoplasm of cervix (07/02/2021), Epilepsy, unspecified, not intractable, without status epilepticus (CMS/[facility]), Metabolic acidosis, normal anion gap (NAG) (10/09/2020), Personal history of diseases of the blood and blood-forming organs and certain disorders involving the immune mechanism (05/26/2021), Personal history of other diseases of the circulatory system, Personal history of other diseases of the digestive system, Personal history of other diseases of the female genital tract (07/02/2021), Personal history of other mental and behavioral disorders, Personal history of other specified conditions, Personal history of other specified conditions (08/06/2020), Personal history of other specified conditions (07/13/2017), Rhabdomyolysis (04/14/2016), Tremor, unspecified (05/30/2017), and Unspecified protein-calorie malnutrition (CMS/HCC) (01/03/2019).    Past Surgical History:    has a past surgical history that includes Other surgical history (12/20/2018); Other surgical history (08/06/2020); Other surgical history (08/06/2020); CT angio abdomen pelvis w and or wo IV IV contrast (1/7/2019); IR angiogram inferior epigastric (1/7/2019);  IR angiogram inferior epigastric pelvic (1/7/2019); CT angio abdomen pelvis w and or wo IV IV contrast (1/11/2019); CT angio abdomen pelvis w and or wo IV IV contrast (2/2/2019); MR angio head wo IV contrast (10/15/2018); MR angio neck wo IV contrast (10/15/2018); and CT angio abdomen pelvis w and or wo IV IV contrast (10/22/2018).    Allergies:   Allergies   Allergen Reactions    Ethinyl Estradiol Unknown     Other reaction(s): Unknown    Ethynodiol Diac-Eth Estradiol Unknown    Meperidine Unknown and Hives     Other reaction(s): Convulsions       Family History:   Family History   Problem Relation Name Age of Onset    Diabetes Mother      Seizures Other         Past Social History:    reports that she has been smoking cigarettes. She started smoking about 15 years ago. She has a 3.75 pack-year smoking history. She has never used smokeless tobacco. She reports current alcohol use. She reports current drug use. Drug: Marijuana.    Vitals:   Temp:  [37.1 °C (98.8 °F)] 37.1 °C (98.8 °F)  Heart Rate:  [] 108  Resp:  [14-25] 24  BP: (138-159)/(83-97) 139/86  FiO2 (%):  [36 %] 36 %    Physical Exam:   GENERAL: Uncomfortable appearing, endorses pain with manipulation of all extremities, curled up on side, eyes closed during conversation but opens spontaneously on command    MENTAL STATE: Orientation was normal to time, place and person. Recent and remote memory was intact. Attention span decreased. No expressive or receptive aphasia.     OPHTHALMOSCOPIC: deferred    CRANIAL NERVES:   CN 2 Visual fields full to confrontation.   CN 3, 4, 6 Pupils round, 3 mm in diameter, equally reactive to light. Lids symmetric; no ptosis. EOMs normal alignment, full range with normal saccades, pursuit and convergence. No nystagmus.   CN 5 Facial sensation intact bilaterally to light touch  CN 7 Normal and symmetric facial strength. Nasolabial folds symmetric.   CN 8 Hearing intact to conversation.  CN 9 Palate elevates  symmetrically.   CN 11 Normal strength of shoulder shrug.  CN 12 Tongue midline, with normal bulk and strength; no fasciculations.     MOTOR: Muscle bulk was normal and tone was normal in both upper and lower extremities. No adventitious movements. Diffuse shivering.                     R       L  Delt          4       4  Bicep        4       4  Tricep       4       4            4       4    Hip Flex     4       4  Leg Ext      4       4  Leg Flex    4       4  DF            4       4  PF             4       4     REFLEXES:   RIGHT UE   LEFT UE   BR:2        BR:2     Biceps:2      Biceps:2     Triceps:2     Triceps:2       RIGHT LLE   LEFT LLE     Knee:0*        Knee:0*   * unable to elicit due to patient's pain.  Ankle:1         Ankle:1       No ankle clonus.    SENSORY: Sensory exam was normal. In both upper and lower extremities, sensation was intact to light touch. Hyperalgesia in all extremities to manipulation of joints and light touch.    COORDINATION: No obvious ataxia with movement, but patient refusing to participate fully in coordination assessment.    GAIT: Not assessed due to safety concerns    Labs:   Results from last 72 hours   Lab Units 12/27/23  0009   WBC AUTO x10*3/uL 8.2   HEMOGLOBIN g/dL 11.0*   HEMATOCRIT % 31.3*   PLATELETS AUTO x10*3/uL 164      Results from last 72 hours   Lab Units 12/27/23  0009   SODIUM mmol/L 140   POTASSIUM mmol/L 4.3   CHLORIDE mmol/L 103   CO2 mmol/L 16*   BUN mg/dL 15   CREATININE mg/dL 0.82   MAGNESIUM mg/dL 1.55*              BNP   Date/Time Value Ref Range Status   05/27/2022 10:49 AM 14 0 - 99 pg/mL Final     Comment:     .  <100 pg/mL - Heart failure unlikely  100-299 pg/mL - Intermediate probability of acute heart  .               failure exacerbation. Correlate with clinical  .               context and patient history.    >=300 pg/mL - Heart Failure likely. Correlate with clinical  .               context and patient history.   Biotin interference may  cause falsely decreased results.   Patients taking a Biotin dose of up to 5 mg/day should   refrain from taking Biotin for 24 hours before sample   collection. Providers may contact their local laboratory   for further information.         Imaging:  No MRI head results found for the past 14 days  No CT head results found for the past 14 days    Other pertinent studies:   No echocardiogram results found for the past 14 days  Encounter Date: 12/26/23   ECG 12 lead   Result Value    Ventricular Rate 97    Atrial Rate 97    CO Interval 162    QRS Duration 74    QT Interval 380    QTC Calculation(Bazett) 482    P Axis 86    R Axis 9    T Axis 79    QRS Count 16    Q Onset 226    P Onset 145    P Offset 191    T Offset 416    QTC Fredericia 445    Narrative    Normal sinus rhythm  Possible Left atrial enlargement  Anterior infarct (cited on or before 01-JUN-2023)  ST & T wave abnormality, consider lateral ischemia  Abnormal ECG  When compared with ECG of 01-JUN-2023 16:20,  No significant change was found    See ED provider note for full interpretation and clinical correlation  Confirmed by Laverne Moraes ([id number]) on 12/27/2023 12:57:06 AM       Assessment/Recommendations  Shi Trevino is a 48 y.o. female with a history of generalized clonic seizure (LEV 1 g twice daily), alcohol use disorder, chronic pancreatitis complicated by biliary dilation/stricture, pancreatic pseudocyst rupture status post splenic artery embolization (2019), pancreatic insufficiency, prior left MCA stroke (on aspirin), asthma, bipolar disorder, depression, PTSD, who presents to [facility] for seizures.  On presentation Keppra level was undetectable and alcohol level undetectable and patient is status post keppra 2 g IV load (20 mg/kg).  Semiology of these episodes was described as shivering as if the patient is cold which is not consistent with previous semiology of generalized clonic seizures, therefore it is unclear if these events are seizures versus  shivering secondary to alcohol withdrawal given that patient's alcohol level on presentation was undetectable.  Given suspicion for alcohol withdrawal on exam with diffuse shivering, diffuse pain, and tachycardia, would recommend treating for alcohol withdrawal with high-dose thiamine, folate, multivitamin, and CIWA protocol.     4D Classification of the Paroxysmal Episodes:  Epileptic  Epileptogenic Zone: Left frontal  Semiology:  Generalized clonic seizures  Frequency:  May 2022, December 2023 (x2)  Localizing Signs: Unknown  Etiology: Stroke  History of Status Epilepticus: No  Co-morbidities: PTSD, bipolar disorder, alcohol use disorder, chronic pancreatitis  Previous AEDs: None    Impression:  Seizure versus alcohol withdrawal    Recommendations:  -Continue Keppra 1000 mg twice daily p.o.  -Obtain CT head without contrast  -Start thiamine 500 mg IV 3 times daily for 3 days, then thiamine 100 mg p.o. daily  -Folate 1 mg daily and multivitamin daily  -CIWA protocol    Jessica Pennington MD  Neurology PGY-2  Epilepsy Team, Pager [phone]     Post-Staffing Updates:  - Recommendations as above  - no need for EEG at this time  - Scheduled follow up with Epilepsy, Dr Campbell, January 9, 2024 at 3pm ([facility] 5th floor)  - instruct patient on strict seizure precautions as below:     - DO NOT drive, use power tools, operate heavy machinery, or be on ladders     - use the shower, not the bath     - refrain from ANY activity which could result in injury from loss-of-consciousness      - These restrictions should continue until instructed by a doctor to do otherwise.      - The patient was informed that these restrictions would be documented in the medical record.   - Neurology to sign off. Please feel free to contact us with any questions/concerns.     Darlene Azar MD  Neurology PGY2

## 2023-12-27 NOTE — H&P
History Of Present Illness  Shi Trevino is a 48 y.o. female w/ PMHx of chronic pancreatitis 2/2 EtOH abuse c/b pancreatic insufficiency and pseudocyst, splenic artery rupture (s/p coil embolization in 2019), portal vein thrombosis, thrombocytopenia, hx of MCA stroke, asthma, bipolar, MDD, PTSD generalized clonic seizures (on maintenance keppra) who is admitted due to EtOH withdrawal and found to be covid +    Attempted to interview the patient but unfortunately couldn't have any meaningful answer since patient recently medicated with lorazepam IV. Per chart review and talk with other providers, the patient was brought to the ED after a friend noted patient have an episode of arms and legs shivering that lasted around 10 seconds, so patient was ultimately brought to the ED. On arrival to the ED, lab work was noticeable for positive cannabidoids, negative canabidoids and undetectable keppra level. Neuro was consulted and recommended a CT-Head, which was roughly unremarkable in comparison to priors. Initially patient as going to be admitted to neurology, but due to concerns for EtOH withdrawal, was referred to be admitted to medicine. While at the ED, max CIWA of 14 (per RN report) in which the patient got 1x lorazepam IV d/t inability to take PO. Otherwise no other issues at the ED.     OBS: Recent admission @ [facility] w/ discharge on Dec/17: “Ms. Trevino came in with symptoms of abdominal pain, nausea, vomiting. Lipase was minimally elevated (67), but given her history of chronic pancreatitis and the fact that this was elevated compared to one month prior, we felt this diagnosis best explained her symptoms. We performed a right upper quadrant ultrasound that demonstrated intra and extra hepatic biliary dilation likely secondary to stricture at the pancreatic head from chronic pancreatitis. Based on this finding and elevation of her liver enzymes (Alkaline Phosphatase 100s, -300s, ALT 60s-80), we wanted to pursue  more detailed imaging with a Magnetic Resonance Cholangiopancreatography (MRCP). The MRCP was ultimately not able to be done because we found that the patient had splenic coils, and while we were able to determine she had these placed at [facility] in early January 2019 for a splenic artery bleed, we were unable to obtain the precise model of coils (patient unable to access  MyChart,  sent discharge summary but did not include IR operative report despite 3 attempts). We consulted GI for assistance with further work-up given that MRCP was not feasible. They recommended obtaining CT Pancreas, which re-demonstrated changes related to chronic pancreatitis without acute pathology. By the time of discharge, her liver enzymes started to improve, and on the day of discharge they were Alk Phos 127, ALT 85, AST 87.” Per my review, no seizure episodes during that admission at [facility].     Past Medical History  Past Medical History:   Diagnosis Date    Alcohol dependence, in remission (CMS/HCC)     Alcohol dependence in remission    Alcoholic ketoacidosis 09/14/2020    Bacterial vaginosis 02/26/2023    Encounter for follow-up examination after completed treatment for conditions other than malignant neoplasm 01/24/2019    Surgery follow-up    Encounter for screening for infections with a predominantly sexual mode of transmission 07/02/2021    Screen for STD (sexually transmitted disease)    Encounter for screening for malignant neoplasm of cervix 07/02/2021    Cervical cancer screening    Epilepsy, unspecified, not intractable, without status epilepticus (CMS/HCC)     Epileptic    Metabolic acidosis, normal anion gap (NAG) 10/09/2020    Personal history of diseases of the blood and blood-forming organs and certain disorders involving the immune mechanism 05/26/2021    History of anemia    Personal history of other diseases of the circulatory system     History of cardiomyopathy    Personal history of other diseases of  the digestive system     History of chronic pancreatitis    Personal history of other diseases of the female genital tract 07/02/2021    History of vaginal pruritus    Personal history of other mental and behavioral disorders     History of post traumatic stress disorder    Personal history of other specified conditions     History of diarrhea    Personal history of other specified conditions 08/06/2020    History of itching    Personal history of other specified conditions 07/13/2017    History of fatigue    Rhabdomyolysis 04/14/2016    Formatting of this note might be different from the original. - CK >7000 on admission likely secondary to volume depletion vs. Withdrawal vs. Intoxication - Contributing to AINSLEY - Fluid resuscitation, will continue mIVF - Trend CK Last Assessment & Plan: Formatting of this note might be different from the original. In the setting of proximal muscle weakness, difficulty maintaining upgaze. Baseline     Tremor, unspecified 05/30/2017    Spells of trembling    Unspecified protein-calorie malnutrition (CMS/HCC) 01/03/2019    Malnutrition       Surgical History  Past Surgical History:   Procedure Laterality Date    CT ABDOMEN PELVIS ANGIOGRAM W AND/OR WO IV CONTRAST  1/7/2019    CT ABDOMEN PELVIS ANGIOGRAM W AND/OR WO IV CONTRAST 1/7/2019 [facility] CLINICAL LEGACY    CT ABDOMEN PELVIS ANGIOGRAM W AND/OR WO IV CONTRAST  1/11/2019    CT ABDOMEN PELVIS ANGIOGRAM W AND/OR WO IV CONTRAST 1/11/2019 [facility] CLINICAL LEGACY    CT ABDOMEN PELVIS ANGIOGRAM W AND/OR WO IV CONTRAST  2/2/2019    CT ABDOMEN PELVIS ANGIOGRAM W AND/OR WO IV CONTRAST 2/2/2019 [facility] CLINICAL LEGACY    CT ABDOMEN PELVIS ANGIOGRAM W AND/OR WO IV CONTRAST  10/22/2018    CT ABDOMEN PELVIS ANGIOGRAM W AND/OR WO IV CONTRAST 10/22/2018 [facility] CLINICAL LEGACY    IR ANGIOGRAM INFERIOR EPIGASTRIC  1/7/2019    IR ANGIOGRAM INFERIOR EPIGASTRIC 1/7/2019 [facility] CLINICAL LEGACY    IR ANGIOGRAM INFERIOR EPIGASTRIC PELVIC  1/7/2019    IR ANGIOGRAM  "INFERIOR EPIGASTRIC PELVIC 1/7/2019 [facility] CLINICAL LEGACY    MR HEAD ANGIO WO IV CONTRAST  10/15/2018    MR HEAD ANGIO WO IV CONTRAST 10/15/2018 [facility] CLINICAL LEGACY    MR NECK ANGIO WO IV CONTRAST  10/15/2018    MR NECK ANGIO WO IV CONTRAST 10/15/2018 [facility] CLINICAL LEGACY    OTHER SURGICAL HISTORY  12/20/2018    Cardiac catheterization    OTHER SURGICAL HISTORY  08/06/2020    Laparoscopy    OTHER SURGICAL HISTORY  08/06/2020    Hernia repair        Social History  Per chart review:  She reports that she has been smoking cigarettes. She started smoking about 15 years ago. She has a 3.75 pack-year smoking history. She has never used smokeless tobacco. She reports current alcohol use. She reports current drug use. Drug: Marijuana.  - Unable to obtain      Family History  Per chart review  Family History   Problem Relation Name Age of Onset    Diabetes Mother      Seizures Other          Allergies  Per chart review  Ethinyl estradiol, Ethynodiol diac-eth estradiol, and Meperidine    Review of Systems   Reason unable to perform ROS: Patient unable to provide meaningful hx since received lorazepam IV.        Physical Exam     Last Recorded Vitals  Blood pressure 136/89, pulse 105, temperature 37.1 °C (98.8 °F), temperature source Tympanic, resp. rate 17, height 1.6 m (5' 3\"), weight 48.2 kg (106 lb 4.2 oz), SpO2 100 %.    Relevant Results  Scheduled medications  acetaminophen, 975 mg, oral, Once  enoxaparin, 40 mg, subcutaneous, q24h  folic acid, 1 mg, oral, Daily  levETIRAcetam, 1,000 mg, intravenous, q12h   Or  levETIRAcetam, 1,000 mg, oral, q12h  multivitamin with minerals, 1 tablet, oral, Daily  [START ON 12/30/2023] thiamine, 100 mg, oral, Daily  thiamine, 100 mg, intravenous, Daily      Continuous medications     PRN medications  PRN medications: LORazepam **OR** LORazepam **OR** LORazepam      Results for orders placed or performed during the hospital encounter of 12/26/23 (from the past 24 hour(s))   Blood Gas " Venous Full Panel Unsolicited   Result Value Ref Range    POCT pH, Venous 7.11 (LL) 7.33 - 7.43 pH    POCT pCO2, Venous 54 (H) 41 - 51 mm Hg    POCT pO2, Venous 53 (H) 35 - 45 mm Hg    POCT SO2, Venous 65 45 - 75 %    POCT Oxy Hemoglobin, Venous 65.3 45.0 - 75.0 %    POCT Hematocrit Calculated, Venous 34.0 (L) 36.0 - 46.0 %    POCT Sodium, Venous 141 136 - 145 mmol/L    POCT Potassium, Venous 4.4 3.5 - 5.3 mmol/L    POCT Chloride, Venous 103 98 - 107 mmol/L    POCT Ionized Calicum, Venous 1.27 1.10 - 1.33 mmol/L    POCT Glucose, Venous 287 (H) 74 - 99 mg/dL    POCT Lactate, Venous 12.2 (HH) 0.4 - 2.0 mmol/L    POCT Base Excess, Venous -12.2 (L) -2.0 - 3.0 mmol/L    POCT HCO3 Calculated, Venous 17.2 (L) 22.0 - 26.0 mmol/L    POCT Hemoglobin, Venous 11.2 (L) 12.0 - 16.0 g/dL    POCT Anion Gap, Venous 25.0 10.0 - 25.0 mmol/L    Patient Temperature 37.0 degrees Celsius   CBC and Auto Differential   Result Value Ref Range    WBC 8.2 4.4 - 11.3 x10*3/uL    nRBC 0.4 (H) 0.0 - 0.0 /100 WBCs    RBC 3.19 (L) 4.00 - 5.20 x10*6/uL    Hemoglobin 11.0 (L) 12.0 - 16.0 g/dL    Hematocrit 31.3 (L) 36.0 - 46.0 %    MCV 98 80 - 100 fL    MCH 34.5 (H) 26.0 - 34.0 pg    MCHC 35.1 32.0 - 36.0 g/dL    RDW 14.1 11.5 - 14.5 %    Platelets 164 150 - 450 x10*3/uL    Neutrophils % 78.7 40.0 - 80.0 %    Immature Granulocytes %, Automated 0.6 0.0 - 0.9 %    Lymphocytes % 13.5 13.0 - 44.0 %    Monocytes % 5.6 2.0 - 10.0 %    Eosinophils % 0.6 0.0 - 6.0 %    Basophils % 1.0 0.0 - 2.0 %    Neutrophils Absolute 6.43 1.20 - 7.70 x10*3/uL    Immature Granulocytes Absolute, Automated 0.05 0.00 - 0.70 x10*3/uL    Lymphocytes Absolute 1.10 (L) 1.20 - 4.80 x10*3/uL    Monocytes Absolute 0.46 0.10 - 1.00 x10*3/uL    Eosinophils Absolute 0.05 0.00 - 0.70 x10*3/uL    Basophils Absolute 0.08 0.00 - 0.10 x10*3/uL   Comprehensive metabolic panel   Result Value Ref Range    Glucose 268 (H) 74 - 99 mg/dL    Sodium 140 136 - 145 mmol/L    Potassium 4.3 3.5 - 5.3  mmol/L    Chloride 103 98 - 107 mmol/L    Bicarbonate 16 (L) 21 - 32 mmol/L    Anion Gap 25 (H) 10 - 20 mmol/L    Urea Nitrogen 15 6 - 23 mg/dL    Creatinine 0.82 0.50 - 1.05 mg/dL    eGFR 88 >60 mL/min/1.73m*2    Calcium 9.8 8.6 - 10.6 mg/dL    Albumin 4.4 3.4 - 5.0 g/dL    Alkaline Phosphatase 114 (H) 33 - 110 U/L    Total Protein 8.2 6.4 - 8.2 g/dL    AST 69 (H) 9 - 39 U/L    Bilirubin, Total 0.4 0.0 - 1.2 mg/dL    ALT 52 (H) 7 - 45 U/L   Lipase   Result Value Ref Range    Lipase 14 9 - 82 U/L   Magnesium   Result Value Ref Range    Magnesium 1.55 (L) 1.60 - 2.40 mg/dL   Ammonia   Result Value Ref Range    Ammonia 77 (H) 16 - 53 umol/L   Acute Toxicology Panel, Blood   Result Value Ref Range    Acetaminophen <10.0 10.0 - 30.0 ug/mL    Salicylate  <3 4 - 20 mg/dL    Alcohol <10 <=10 mg/dL   ECG 12 lead   Result Value Ref Range    Ventricular Rate 97 BPM    Atrial Rate 97 BPM    SC Interval 162 ms    QRS Duration 74 ms    QT Interval 380 ms    QTC Calculation(Bazett) 482 ms    P Axis 86 degrees    R Axis 9 degrees    T Axis 79 degrees    QRS Count 16 beats    Q Onset 226 ms    P Onset 145 ms    P Offset 191 ms    T Offset 416 ms    QTC Fredericia 445 ms   Levetiracetam   Result Value Ref Range    Keppra <2 (L) 10 - 40 ug/mL   Urinalysis with Reflex Microscopic   Result Value Ref Range    Color, Urine Straw Straw, Yellow    Appearance, Urine Clear Clear    Specific Gravity, Urine 1.013 1.005 - 1.035    pH, Urine 6.0 5.0, 5.5, 6.0, 6.5, 7.0, 7.5, 8.0    Protein, Urine 30 (1+) (N) NEGATIVE mg/dL    Glucose, Urine 50 (1+) (A) NEGATIVE mg/dL    Blood, Urine NEGATIVE NEGATIVE    Ketones, Urine 5 (TRACE) (A) NEGATIVE mg/dL    Bilirubin, Urine NEGATIVE NEGATIVE    Urobilinogen, Urine <2.0 <2.0 mg/dL    Nitrite, Urine NEGATIVE NEGATIVE    Leukocyte Esterase, Urine NEGATIVE NEGATIVE   Drug Screen, Urine   Result Value Ref Range    Amphetamine Screen, Urine Presumptive Negative Presumptive Negative    Barbiturate Screen,  Urine Presumptive Negative Presumptive Negative    Benzodiazepines Screen, Urine Presumptive Negative Presumptive Negative    Cannabinoid Screen, Urine Presumptive Positive (A) Presumptive Negative    Cocaine Metabolite Screen, Urine Presumptive Negative Presumptive Negative    Fentanyl Screen, Urine Presumptive Negative Presumptive Negative    Opiate Screen, Urine Presumptive Negative Presumptive Negative    Oxycodone Screen, Urine Presumptive Negative Presumptive Negative    PCP Screen, Urine Presumptive Negative Presumptive Negative   Urine Gray Tube   Result Value Ref Range    Extra Tube Hold for add-ons.    Microscopic Only, Urine   Result Value Ref Range    WBC, Urine NONE 1-5, NONE /HPF    RBC, Urine 1-2 NONE, 1-2, 3-5 /HPF    Squamous Epithelial Cells, Urine 1-9 (SPARSE) Reference range not established. /HPF   Sars-CoV-2 and Influenza A/B PCR   Result Value Ref Range    Flu A Result Not Detected Not Detected    Flu B Result Not Detected Not Detected    Coronavirus 2019, PCR Detected (A) Not Detected   BLOOD GAS VENOUS FULL PANEL   Result Value Ref Range    POCT pH, Venous 7.44 (H) 7.33 - 7.43 pH    POCT pCO2, Venous 35 (L) 41 - 51 mm Hg    POCT pO2, Venous 48 (H) 35 - 45 mm Hg    POCT SO2, Venous 73 45 - 75 %    POCT Oxy Hemoglobin, Venous 71.9 45.0 - 75.0 %    POCT Hematocrit Calculated, Venous 31.0 (L) 36.0 - 46.0 %    POCT Sodium, Venous 135 (L) 136 - 145 mmol/L    POCT Potassium, Venous 3.8 3.5 - 5.3 mmol/L    POCT Chloride, Venous 103 98 - 107 mmol/L    POCT Ionized Calicum, Venous 1.17 1.10 - 1.33 mmol/L    POCT Glucose, Venous 107 (H) 74 - 99 mg/dL    POCT Lactate, Venous 1.2 0.4 - 2.0 mmol/L    POCT Base Excess, Venous -0.1 -2.0 - 3.0 mmol/L    POCT HCO3 Calculated, Venous 23.8 22.0 - 26.0 mmol/L    POCT Hemoglobin, Venous 10.2 (L) 12.0 - 16.0 g/dL    POCT Anion Gap, Venous 12.0 10.0 - 25.0 mmol/L    Patient Temperature 37.0 degrees Celsius    FiO2 0 %         CT head wo IV contrast    Result Date:  12/27/2023  Interpreted By:  Inocente Davies,  Aubrey Cardenas STUDY: CT HEAD WO IV CONTRAST;  12/27/2023 8:18 am   INDICATION: Signs/Symptoms:sz.   COMPARISON: CT head 07/29/2023.   ACCESSION NUMBER(S): [id number]   ORDERING CLINICIAN: ANIYA CORBETT   TECHNIQUE: Noncontrast axial CT scan of the head was performed. Angled reformats in brain and bone windows were generated. The images were reviewed in bone, brain, blood and soft tissue windows.   FINDINGS: CSF Spaces: The ventricles, sulci and basal cisterns are within normal limits. There is no extraaxial fluid collection.   Parenchyma: Hypodensity adjacent to the frontal horn of the left lateral ventricle in the frontal and additional smaller hypodensities in the bilateral parietal lobes are similar to the prior exam, likely representing encephalomalacia from prior infarcts. There are additional scattered periventricular subcortical white matter hypodensities which are nonspecific and likely represent sequela of small-vessel ischemic changes. Lacunar infarct in the left cerebellar hemisphere is also unchanged. Otherwise, the grey-white differentiation is intact. There is no mass effect or midline shift. There is no intracranial hemorrhage.   Calvarium: The calvarium is unremarkable.   Paranasal sinuses and mastoids: Visualized paranasal sinuses and mastoids are clear.       Similar appearance of encephalomalacia involving the left frontal and bilateral parietal lobes as well as lacunar infarct of the left cerebellar hemisphere. No evidence of acute intracranial abnormality. If concerns for an acute infarct, may consider MRI brain for further evaluation.   I personally reviewed the images/study and I agree with the findings as stated by resident physician Dr. Ronald Couch . This study was interpreted at [facility], [place], [place].   MACRO: None   Signed by: Inocente Davies 12/27/2023 11:40 AM Dictation workstation:    [id number]    ECG 12 lead    Result Date: 12/27/2023  Normal sinus rhythm Possible Left atrial enlargement Anterior infarct (cited on or before 01-JUN-2023) ST & T wave abnormality, consider lateral ischemia Abnormal ECG When compared with ECG of 01-JUN-2023 16:20, No significant change was found See ED provider note for full interpretation and clinical correlation Confirmed by Laverne Moraes ([id number]) on 12/27/2023 12:57:06 AM    XR chest 1 view    Result Date: 12/27/2023  Interpreted By:  Finkelstein, Evan,  and Cady Rose STUDY: XR CHEST 1 VIEW;  12/27/2023 12:23 am   INDICATION: Signs/Symptoms:intractable seizures since 10pm, r/u aspiration.   COMPARISON: Chest radiograph 12/27/2022   ACCESSION NUMBER(S): [id number]   ORDERING CLINICIAN: ADAM ARIZA   FINDINGS: AP radiograph of the chest was provided. Patient is rotated to the right.   CARDIOMEDIASTINAL SILHOUETTE: Cardiomediastinal silhouette is normal in size and configuration.   LUNGS: No focal consolidation, pleural effusion, or pneumothorax.   ABDOMEN: No remarkable upper abdominal findings.   BONES: No acute osseous changes.       1.  No evidence of acute cardiopulmonary process.   I personally reviewed the images/study and I agree with Rose Lazar DO's (radiology resident) findings as stated. This study was interpreted at [place], [place].   MACRO: None   Signed by: Evan Finkelstein 12/27/2023 12:53 AM Dictation workstation:   [id number]        Assessment/Plan     Shi Trevino is a 48 y.o. female w/ PMHx of chronic pancreatitis 2/2 EtOH abuse c/b pancreatic insufficiency and pseudocyst, splenic artery rupture (s/p coil embolization in 2019), portal vein thrombosis, thrombocytopenia, hx of MCA stroke, asthma, bipolar, MDD, PTSD generalized clonic seizures (on maintenance keppra) who is admitted due to EtOH withdrawal and found to be covid +    #Seizure disorder vs EtOH withdrawal  :: reports  of seizure per friend, episode lasted 10 secs  :: undetectable keppra levels  :: s/p keppra load   :: Neuro-epilepsy consulted and signed off  - c/w keppra 1000mg BID PO (ordered IV as alternative if PO intake is not feasible)  - thiamine 500mg IV x3 followed 100mg PO daily  - folate 1mg + MMV      #Covid-19 +  :: mostly incidental findings given no fever, oxygen requirements and other reports of symptoms from it  :: unclear if vaccinated  - given low benefit of remdesivir and theoretical risk of seizures (2%), will defer treating this patient at this time. Will revisit if clinical status change    #EtOH use disorder  :: EtOH on admission undetectable  :: last drink per patient on 12/26 PM  - CIWA protocol w/ IV lorazepram given current inability to take PO  - will switch to PO once patient more awake    # Hx of MCA stroke  :: no focal deficits on exam  - c/w home aspirin    MISC    #Pancreatic insufficiency  :: unclear if patient is on any meds  :: per last discharge it doesn't appear to be the case  - will continue to monitor    F: PRN  E: PRN  N: NPO w/ meds until more awake  A: PIV  DVT: Enoxaparin  GI: PPI  NOK: Alejandrina (friend) [phone] (per chart review)  Code Status: Presumed Full Code      Eduardo Cain MD

## 2023-12-28 ENCOUNTER — PHARMACY VISIT (OUTPATIENT)
Dept: PHARMACY | Facility: CLINIC | Age: 48
End: 2023-12-28
Payer: MEDICAID

## 2023-12-28 ENCOUNTER — DOCUMENTATION (OUTPATIENT)
Dept: CARE COORDINATION | Facility: CLINIC | Age: 48
End: 2023-12-28
Payer: COMMERCIAL

## 2023-12-28 VITALS
WEIGHT: 106.26 LBS | HEART RATE: 84 BPM | BODY MASS INDEX: 18.83 KG/M2 | TEMPERATURE: 96.8 F | RESPIRATION RATE: 18 BRPM | SYSTOLIC BLOOD PRESSURE: 124 MMHG | HEIGHT: 63 IN | DIASTOLIC BLOOD PRESSURE: 81 MMHG | OXYGEN SATURATION: 98 %

## 2023-12-28 LAB
ALBUMIN SERPL BCP-MCNC: 3.7 G/DL (ref 3.4–5)
ALP SERPL-CCNC: 95 U/L (ref 33–110)
ALT SERPL W P-5'-P-CCNC: 48 U/L (ref 7–45)
ANION GAP SERPL CALC-SCNC: 14 MMOL/L (ref 10–20)
AST SERPL W P-5'-P-CCNC: 74 U/L (ref 9–39)
BILIRUB SERPL-MCNC: 0.4 MG/DL (ref 0–1.2)
BUN SERPL-MCNC: 17 MG/DL (ref 6–23)
CALCIUM SERPL-MCNC: 9.5 MG/DL (ref 8.6–10.6)
CHLORIDE SERPL-SCNC: 103 MMOL/L (ref 98–107)
CO2 SERPL-SCNC: 23 MMOL/L (ref 21–32)
CREAT SERPL-MCNC: 0.79 MG/DL (ref 0.5–1.05)
ERYTHROCYTE [DISTWIDTH] IN BLOOD BY AUTOMATED COUNT: 14.5 % (ref 11.5–14.5)
GFR SERPL CREATININE-BSD FRML MDRD: >90 ML/MIN/1.73M*2
GLUCOSE SERPL-MCNC: 119 MG/DL (ref 74–99)
HCT VFR BLD AUTO: 30.9 % (ref 36–46)
HGB BLD-MCNC: 10.1 G/DL (ref 12–16)
MAGNESIUM SERPL-MCNC: 1.7 MG/DL (ref 1.6–2.4)
MCH RBC QN AUTO: 33.7 PG (ref 26–34)
MCHC RBC AUTO-ENTMCNC: 32.7 G/DL (ref 32–36)
MCV RBC AUTO: 103 FL (ref 80–100)
NRBC BLD-RTO: 0 /100 WBCS (ref 0–0)
PHOSPHATE SERPL-MCNC: 3.3 MG/DL (ref 2.5–4.9)
PLATELET # BLD AUTO: 144 X10*3/UL (ref 150–450)
POTASSIUM SERPL-SCNC: 3.3 MMOL/L (ref 3.5–5.3)
PROT SERPL-MCNC: 7.4 G/DL (ref 6.4–8.2)
RBC # BLD AUTO: 3 X10*6/UL (ref 4–5.2)
SODIUM SERPL-SCNC: 137 MMOL/L (ref 136–145)
WBC # BLD AUTO: 3.6 X10*3/UL (ref 4.4–11.3)

## 2023-12-28 PROCEDURE — 2500000004 HC RX 250 GENERAL PHARMACY W/ HCPCS (ALT 636 FOR OP/ED): Performed by: PHYSICIAN ASSISTANT

## 2023-12-28 PROCEDURE — 36415 COLL VENOUS BLD VENIPUNCTURE: CPT | Performed by: STUDENT IN AN ORGANIZED HEALTH CARE EDUCATION/TRAINING PROGRAM

## 2023-12-28 PROCEDURE — 2500000001 HC RX 250 WO HCPCS SELF ADMINISTERED DRUGS (ALT 637 FOR MEDICARE OP): Performed by: PHYSICIAN ASSISTANT

## 2023-12-28 PROCEDURE — 85027 COMPLETE CBC AUTOMATED: CPT | Performed by: STUDENT IN AN ORGANIZED HEALTH CARE EDUCATION/TRAINING PROGRAM

## 2023-12-28 PROCEDURE — RXMED WILLOW AMBULATORY MEDICATION CHARGE

## 2023-12-28 PROCEDURE — 80053 COMPREHEN METABOLIC PANEL: CPT | Performed by: STUDENT IN AN ORGANIZED HEALTH CARE EDUCATION/TRAINING PROGRAM

## 2023-12-28 PROCEDURE — 99231 SBSQ HOSP IP/OBS SF/LOW 25: CPT

## 2023-12-28 PROCEDURE — 2500000001 HC RX 250 WO HCPCS SELF ADMINISTERED DRUGS (ALT 637 FOR MEDICARE OP): Performed by: STUDENT IN AN ORGANIZED HEALTH CARE EDUCATION/TRAINING PROGRAM

## 2023-12-28 PROCEDURE — 83735 ASSAY OF MAGNESIUM: CPT | Performed by: STUDENT IN AN ORGANIZED HEALTH CARE EDUCATION/TRAINING PROGRAM

## 2023-12-28 PROCEDURE — 84100 ASSAY OF PHOSPHORUS: CPT | Performed by: STUDENT IN AN ORGANIZED HEALTH CARE EDUCATION/TRAINING PROGRAM

## 2023-12-28 RX ORDER — LANOLIN ALCOHOL/MO/W.PET/CERES
100 CREAM (GRAM) TOPICAL DAILY
Qty: 90 TABLET | Refills: 3 | Status: SHIPPED | OUTPATIENT
Start: 2023-12-30 | End: 2023-12-28 | Stop reason: SDUPTHER

## 2023-12-28 RX ORDER — LEVETIRACETAM 1000 MG/1
1000 TABLET ORAL 2 TIMES DAILY
Qty: 180 TABLET | Refills: 3 | Status: SHIPPED | OUTPATIENT
Start: 2023-12-28 | End: 2023-12-28 | Stop reason: SDUPTHER

## 2023-12-28 RX ORDER — NIRMATRELVIR AND RITONAVIR 300-100 MG
3 KIT ORAL 2 TIMES DAILY
Qty: 1 DOSE PACK | Refills: 0 | Status: SHIPPED | OUTPATIENT
Start: 2023-12-28 | End: 2024-01-09 | Stop reason: ALTCHOICE

## 2023-12-28 RX ORDER — NIRMATRELVIR AND RITONAVIR 300-100 MG
3 KIT ORAL 2 TIMES DAILY
Qty: 1 DOSE PACK | Refills: 0 | Status: SHIPPED | OUTPATIENT
Start: 2023-12-28 | End: 2023-12-28 | Stop reason: SDUPTHER

## 2023-12-28 RX ORDER — LEVETIRACETAM 1000 MG/1
1000 TABLET ORAL 2 TIMES DAILY
Qty: 180 TABLET | Refills: 3 | Status: SHIPPED | OUTPATIENT
Start: 2023-12-28 | End: 2024-01-09 | Stop reason: SDUPTHER

## 2023-12-28 RX ORDER — FOLIC ACID 1 MG/1
1 TABLET ORAL DAILY
Qty: 90 TABLET | Refills: 3 | Status: SHIPPED | OUTPATIENT
Start: 2023-12-29 | End: 2023-12-28 | Stop reason: SDUPTHER

## 2023-12-28 RX ORDER — LANOLIN ALCOHOL/MO/W.PET/CERES
100 CREAM (GRAM) TOPICAL DAILY
Qty: 90 TABLET | Refills: 3 | Status: SHIPPED | OUTPATIENT
Start: 2023-12-30 | End: 2024-01-09 | Stop reason: SDUPTHER

## 2023-12-28 RX ORDER — FOLIC ACID 1 MG/1
1 TABLET ORAL DAILY
Qty: 90 TABLET | Refills: 3 | Status: SHIPPED | OUTPATIENT
Start: 2023-12-29 | End: 2024-01-09 | Stop reason: SDUPTHER

## 2023-12-28 RX ADMIN — FOLIC ACID 1 MG: 1 TABLET ORAL at 10:09

## 2023-12-28 RX ADMIN — THIAMINE HYDROCHLORIDE 100 MG: 100 INJECTION, SOLUTION INTRAMUSCULAR; INTRAVENOUS at 10:09

## 2023-12-28 RX ADMIN — Medication 1 TABLET: at 10:09

## 2023-12-28 RX ADMIN — LEVETIRACETAM 1000 MG: 500 TABLET, FILM COATED ORAL at 10:09

## 2023-12-28 RX ADMIN — ASPIRIN 81 MG CHEWABLE TABLET 81 MG: 81 TABLET CHEWABLE at 10:09

## 2023-12-28 SDOH — SOCIAL STABILITY: SOCIAL INSECURITY: DOES ANYONE TRY TO KEEP YOU FROM HAVING/CONTACTING OTHER FRIENDS OR DOING THINGS OUTSIDE YOUR HOME?: NO

## 2023-12-28 SDOH — SOCIAL STABILITY: SOCIAL INSECURITY: DO YOU FEEL UNSAFE GOING BACK TO THE PLACE WHERE YOU ARE LIVING?: NO

## 2023-12-28 SDOH — SOCIAL STABILITY: SOCIAL INSECURITY: HAS ANYONE EVER THREATENED TO HURT YOUR FAMILY OR YOUR PETS?: NO

## 2023-12-28 SDOH — SOCIAL STABILITY: SOCIAL INSECURITY: DO YOU FEEL ANYONE HAS EXPLOITED OR TAKEN ADVANTAGE OF YOU FINANCIALLY OR OF YOUR PERSONAL PROPERTY?: NO

## 2023-12-28 SDOH — SOCIAL STABILITY: SOCIAL INSECURITY: WERE YOU ABLE TO COMPLETE ALL THE BEHAVIORAL HEALTH SCREENINGS?: YES

## 2023-12-28 SDOH — SOCIAL STABILITY: SOCIAL INSECURITY: ARE YOU OR HAVE YOU BEEN THREATENED OR ABUSED PHYSICALLY, EMOTIONALLY, OR SEXUALLY BY ANYONE?: NO

## 2023-12-28 SDOH — SOCIAL STABILITY: SOCIAL INSECURITY: HAVE YOU HAD THOUGHTS OF HARMING ANYONE ELSE?: NO

## 2023-12-28 SDOH — SOCIAL STABILITY: SOCIAL INSECURITY: ARE THERE ANY APPARENT SIGNS OF INJURIES/BEHAVIORS THAT COULD BE RELATED TO ABUSE/NEGLECT?: NO

## 2023-12-28 SDOH — SOCIAL STABILITY: SOCIAL INSECURITY: ABUSE: ADULT

## 2023-12-28 ASSESSMENT — COGNITIVE AND FUNCTIONAL STATUS - GENERAL
WALKING IN HOSPITAL ROOM: A LITTLE
DAILY ACTIVITIY SCORE: 24
MOBILITY SCORE: 22
CLIMB 3 TO 5 STEPS WITH RAILING: A LITTLE
PATIENT BASELINE BEDBOUND: NO
DAILY ACTIVITIY SCORE: 24
CLIMB 3 TO 5 STEPS WITH RAILING: A LITTLE
MOBILITY SCORE: 23

## 2023-12-28 ASSESSMENT — LIFESTYLE VARIABLES
SUBSTANCE_ABUSE_PAST_12_MONTHS: YES
AUDITORY DISTURBANCES: NOT PRESENT
HEADACHE, FULLNESS IN HEAD: NOT PRESENT
HOW MANY STANDARD DRINKS CONTAINING ALCOHOL DO YOU HAVE ON A TYPICAL DAY: 5 OR 6
PAROXYSMAL SWEATS: NO SWEAT VISIBLE
HOW OFTEN DO YOU HAVE 6 OR MORE DRINKS ON ONE OCCASION: DAILY OR ALMOST DAILY
TREMOR: NO TREMOR
AUDIT-C TOTAL SCORE: 10
NAUSEA AND VOMITING: NO NAUSEA AND NO VOMITING
ORIENTATION AND CLOUDING OF SENSORIUM: ORIENTED AND CAN DO SERIAL ADDITIONS
TOTAL SCORE: 0
SKIP TO QUESTIONS 9-10: 0
PRESCIPTION_ABUSE_PAST_12_MONTHS: NO
ANXIETY: NO ANXIETY, AT EASE
HOW OFTEN DO YOU HAVE A DRINK CONTAINING ALCOHOL: 4 OR MORE TIMES A WEEK
AGITATION: NORMAL ACTIVITY
VISUAL DISTURBANCES: NOT PRESENT
AUDIT-C TOTAL SCORE: 10

## 2023-12-28 ASSESSMENT — ACTIVITIES OF DAILY LIVING (ADL)
LACK_OF_TRANSPORTATION: NO
LACK_OF_TRANSPORTATION: NO

## 2023-12-28 ASSESSMENT — PAIN SCALES - GENERAL: PAINLEVEL_OUTOF10: 0 - NO PAIN

## 2023-12-28 ASSESSMENT — COLUMBIA-SUICIDE SEVERITY RATING SCALE - C-SSRS
2. HAVE YOU ACTUALLY HAD ANY THOUGHTS OF KILLING YOURSELF?: NO
6. HAVE YOU EVER DONE ANYTHING, STARTED TO DO ANYTHING, OR PREPARED TO DO ANYTHING TO END YOUR LIFE?: NO
1. IN THE PAST MONTH, HAVE YOU WISHED YOU WERE DEAD OR WISHED YOU COULD GO TO SLEEP AND NOT WAKE UP?: NO

## 2023-12-28 ASSESSMENT — PATIENT HEALTH QUESTIONNAIRE - PHQ9
2. FEELING DOWN, DEPRESSED OR HOPELESS: MORE THAN HALF THE DAYS
1. LITTLE INTEREST OR PLEASURE IN DOING THINGS: SEVERAL DAYS
SUM OF ALL RESPONSES TO PHQ9 QUESTIONS 1 & 2: 3

## 2023-12-28 NOTE — NURSING NOTE
Discharge Note:  Pt. A&O x 4 at time of discharge. IV removed, catheter intact. Reviewed discharge instructions with pt, no questions. Pt aware of neurology appointment and attendance of that appointment was stressed. Pt initially hesitant for discharge as she was concerned about possible seizure activity once discharged.  came up and spoke with her and pt was comfortable with discharge after conversation. Telemetry removed.  set up for pt. Pt mentioned that she had a pocket knife when she came into the ED, ED contacted and security contacted as well. Pt. Will follow up post discharge..

## 2023-12-28 NOTE — PROGRESS NOTES
Pt admitted for etoh withdrawal.    TUCKER Franklin   III  [facility] [place] [facility]  Office Phone: [phone]  [email or web address]

## 2023-12-28 NOTE — CARE PLAN
The patient's goals for the shift include      The clinical goals for the shift include pt will have not any respiratory distress    Over the shift, the patient did not make progress toward the following goals. Barriers to progression include etoh abuse. Recommendations to address these barriers include continued drinking.

## 2023-12-28 NOTE — HOSPITAL COURSE
Shi Trevino is a 48 y.o. female w/ PMHx of chronic pancreatitis 2/2 EtOH abuse c/b pancreatic insufficiency and pseudocyst, splenic artery rupture (s/p coil embolization in 2019), portal vein thrombosis, thrombocytopenia, hx of MCA stroke, asthma, bipolar, MDD, PTSD generalized clonic seizures (on maintenance keppra) who presented to the ED after found to be shaking on the floor by her roommate. Per roommate, pt drinks 5-6 cups of liquor per day however pt reports she only drinks alcohol on the weekends when she drinks a 'pint of liquor' each day. In the ED, Keppra level is undetectable, alcohol level undetectable, urine tox screen positive for cannabinoids, NH4 77, WBC 8.2, UA without sign of infection, and magnesium 1.55. Pt reports that she takes her Keppra everyday. Pt was seen by neurology/epilepsy who determined presentation was most consistent with alcohol withdrawal symptoms rather than being purely epileptic in nature and EEG was deferred. Pt was also found to be COVID + in the ED but was ultimately admitted due to EtOH withdrawal. Patient was loaded with Keppra 2 g (20 mg/kg) and was placed on CIWA protocol with thiamine and folate. On the floor, pt endorsed upper respiratory congestion, cough and diarrhea that started two days ago, consistent with her COVID infection. Pt remained stable without further signs of acute withdrawal throughout her admission. Pt is being discharged in stable condition with a Paxlovid pack, thiamine, folate and new refills on her Keppra. Pt has a follow up appointment with epilepsy on 1/9/24 at [facility] 5th floor.

## 2023-12-28 NOTE — DISCHARGE SUMMARY
Discharge Diagnosis  COVID-19  Alcohol Withdrawal  Possible seizure     Issues Requiring Follow-Up      Test Results Pending At Discharge  Pending Labs       Order Current Status    Blood Gas Lactic Acid, Venous In process          Hospital Course  Shi Trevino is a 48 y.o. female w/ PMHx of chronic pancreatitis 2/2 EtOH abuse c/b pancreatic insufficiency and pseudocyst, splenic artery rupture (s/p coil embolization in 2019), portal vein thrombosis, thrombocytopenia, hx of MCA stroke, asthma, bipolar, MDD, PTSD generalized clonic seizures (on maintenance keppra) who presented to the ED after found to be shaking on the floor by her roommate. Per roommate, pt drinks 5-6 cups of liquor per day however pt reports she only drinks alcohol on the weekends when she drinks a 'pint of liquor' each day. In the ED, Keppra level is undetectable, alcohol level undetectable, urine tox screen positive for cannabinoids, NH4 77, WBC 8.2, UA without sign of infection, and magnesium 1.55. Pt reports that she takes her Keppra everyday. Pt was seen by neurology/epilepsy who determined presentation was most consistent with alcohol withdrawal symptoms rather than being purely epileptic in nature and EEG was deferred. Pt was also found to be COVID + in the ED but was ultimately admitted due to EtOH withdrawal. Patient was loaded with Keppra 2 g (20 mg/kg) and was placed on CIWA protocol with thiamine and folate. On the floor, pt endorsed upper respiratory congestion, cough and diarrhea that started two days ago, consistent with her COVID infection. Pt remained stable without further signs of acute withdrawal throughout her admission. Pt is being discharged in stable condition with a Paxlovid pack, thiamine, folate and new refills on her Keppra. Pt has a follow up appointment with epilepsy on 1/9/24 at [facility] 5th floor.     Pertinent Physical Exam At Time of Discharge  Physical Exam  Vitals reviewed.   Constitutional:       General:  She is not in acute distress.     Appearance: Normal appearance. She is not ill-appearing.   HENT:      Head: Normocephalic and atraumatic.      Nose: Nose normal.   Eyes:      Extraocular Movements: Extraocular movements intact.      Conjunctiva/sclera: Conjunctivae normal.   Cardiovascular:      Rate and Rhythm: Normal rate and regular rhythm.      Heart sounds: No murmur heard.     No friction rub. No gallop.   Pulmonary:      Effort: Pulmonary effort is normal. No respiratory distress.      Breath sounds: Normal breath sounds. No wheezing, rhonchi or rales.   Abdominal:      General: Abdomen is flat. There is no distension.      Palpations: Abdomen is soft.      Tenderness: There is no abdominal tenderness. There is no guarding.   Musculoskeletal:         General: No signs of injury. Normal range of motion.      Cervical back: Normal range of motion.      Right lower leg: No edema.      Left lower leg: No edema.   Skin:     General: Skin is warm and dry.   Neurological:      General: No focal deficit present.      Mental Status: She is alert and oriented to person, place, and time.      Cranial Nerves: No cranial nerve deficit.      Motor: No weakness.      Gait: Gait normal.   Psychiatric:         Mood and Affect: Mood normal.         Behavior: Behavior normal.       Home Medications     Medication List      START taking these medications     folic acid 1 mg tablet; Commonly known as: Folvite; Take 1 tablet (1 mg)   by mouth once daily. Do not start before December 29, 2023.; Start taking   on: December 29, 2023   Paxlovid 300 mg (150 mg x 2)-100 mg tablet therapy pack; Generic drug:   nirmatrelvir-ritonavir; Take 3 tablets by mouth 2 times a day. Follow the   instructions on the package   thiamine 100 mg tablet; Commonly known as: Vitamin B-1; Take 1 tablet   (100 mg) by mouth once daily. Do not start before December 30, 2023.;   Start taking on: December 30, 2023     CHANGE how you take these medications      amLODIPine 5 mg tablet; Commonly known as: Norvasc; Take 1 tablet (5 mg)   by mouth once daily.; What changed: Another medication with the same name   was removed. Continue taking this medication, and follow the directions   you see here.   aspirin 81 mg EC tablet; Take 1 tablet (81 mg) by mouth once daily.;   What changed: Another medication with the same name was removed. Continue   taking this medication, and follow the directions you see here.   Creon 24,000-76,000 -120,000 unit capsule; Generic drug:   lipase-protease-amylase; Take 1 capsule by mouth 3 times a day with   meals.; What changed: Another medication with the same name was removed.   Continue taking this medication, and follow the directions you see here.   levETIRAcetam 1,000 mg tablet; Commonly known as: Keppra; Take 1 tablet   (1,000 mg) by mouth 2 times a day.; What changed: Another medication with   the same name was removed. Continue taking this medication, and follow the   directions you see here.     CONTINUE taking these medications     * Pain Relief (acetaminophen) 325 mg tablet; Generic drug:   acetaminophen; TAKE 3 TABLETS BY MOUTH EVERY 6 HOURS, AS NEEDED FOR SEVERE   PAIN (7-10)   * acetaminophen 500 mg tablet; Commonly known as: Tylenol   albuterol 90 mcg/actuation inhaler; Inhale 1 puff every 4 hours if   needed for wheezing.   cholecalciferol 50 MCG (2000 UT) tablet; Commonly known as: Vitamin D-3;   Take 1 tablet (50 mcg) by mouth once daily.   cyanocobalamin 1,000 mcg tablet; Commonly known as: Vitamin B-12; Take 1   tablet (1,000 mcg) by mouth once daily. as directed   dicyclomine 10 mg capsule; Commonly known as: Bentyl; Take 1 capsule (10   mg) by mouth 3 times a day.   docusate sodium 100 mg capsule; Commonly known as: Colace; Take 1   capsule (100 mg) by mouth 2 times a day as needed for constipation.   famotidine 20 mg tablet; Commonly known as: Pepcid; Take 1 tablet (20   mg) by mouth 2 times a day as needed for heartburn.    fluticasone 50 mcg/actuation nasal spray; Commonly known as: Flonase;   USE 2 SPRAYS IN EACH NOSTRIL ONCE DAILY. SHAKE GENTLY. BEFORE FIRST USE,   PRIME PUMP. AFTER USE, CLEAN TIP AND REPLACE CAPSULE.   gabapentin 300 mg capsule; Commonly known as: Neurontin; Take 1 capsule   (300 mg) by mouth once daily.   guaiFENesin 600 mg 12 hr tablet; Commonly known as: Mucinex; Take 2   tablets (1,200 mg) by mouth 2 times a day as needed for cough. Do not   crush, chew, or split.   ibuprofen 800 mg tablet   melatonin 3 mg tablet; Take 1 tablet (3 mg) by mouth once daily at   bedtime.   metoprolol succinate XL 50 mg 24 hr tablet; Commonly known as:   Toprol-XL; TAKE 1 TABLET BY MOUTH ONCE DAILY   mirtazapine 7.5 mg tablet; Commonly known as: Remeron; Take 1 tablet   (7.5 mg) by mouth once daily at bedtime.   multivitamin with minerals tablet; Take 1 tablet by mouth once daily.   naltrexone 50 mg tablet; Commonly known as: Depade; Take 1 tablet (50   mg) by mouth once daily. Increase to 2 tablets once daily if still getting   cravings after 1 week   ondansetron ODT 4 mg disintegrating tablet; Commonly known as:   Zofran-ODT; Take 1 tablet (4 mg) by mouth every 8 hours if needed for   nausea.   pantoprazole 40 mg EC tablet; Commonly known as: ProtoNix; Take 1 tablet   (40 mg) by mouth once daily in the morning. Take before meals.   rosuvastatin 40 mg tablet; Commonly known as: Crestor; Take 1 tablet (40   mg) by mouth once daily.  * This list has 2 medication(s) that are the same as other medications   prescribed for you. Read the directions carefully, and ask your doctor or   other care provider to review them with you.     STOP taking these medications     fluconazole 150 mg tablet; Commonly known as: Diflucan       Outpatient Follow-Up  Future Appointments   Date Time Provider Department Center   1/9/2024  3:00 PM Urszula Fitch MD [id number] Academic   1/9/2024  4:00 PM Roxanna Tellez MD [id number] Academic    1/18/2024  3:00 PM Jacques Tinoco MD [id number] Academic   3/26/2024  3:15 PM Maira Banks OD [id number] Academic       Yaron Clarke DO  PGY-1 Family Medicine

## 2023-12-28 NOTE — CARE PLAN
The patient's goals for the shift include  feel better.    The clinical goals for the shift include pt will have not any respiratory distress for duration of shift.     Over the shift, the patient met all goals.

## 2023-12-29 NOTE — CARE PLAN
CHW Note 12/29 0942  This CHW was consulted to follow up with pt to provide resources for applying to school.  Pt was discharged yesterday evening, I attempted to call this pt's primary mobile phone listed in chart but had to leave a  voicemail.  I left a vm with my name, role, reason for call, and requested a call back to discuss resources.  Albertina Cabrera, [facility]

## 2024-01-01 NOTE — ED PROVIDER NOTES
HPI:  Patient is a 48-year-old female with a history of chronic pancreatitis secondary to alcohol use disorder complicated by pancreatic insufficiency, splenic artery rupture status post coil embolization (2019), chronic portal vein thrombosis, previous MCA stroke, bipolar disorder, generalized tonic-clonic seizures (reportedly not compliant with home Keppra) who presented via EMS from friend's home with altered mental status.  Patient's friend (who she has been staying with) reported to EMS that the patient has had 2 seizures every 30 minutes since 10 PM last night.  Patient minimally responding to questions.  There is no seizure-like activity observed by EMS or myself.  Unable to obtain ROS secondary to altered mental status.    ROS: Unable to obtain ROS secondary to altered mental status.    PMH/PSH: Reviewed in EMR. As above in HPI.  SH: Unable to obtain secondary to altered mental status.  Allergies:   Allergies   Allergen Reactions    Ethinyl Estradiol Unknown     Other reaction(s): Unknown    Ethynodiol Diac-Eth Estradiol Unknown    Meperidine Unknown and Hives     Other reaction(s): Convulsions      Medications: See prescription writer for full medication list.     General: young Black female in no acute distress, moaning, minimally answers questions  HEENT:  No rhinorrhea. Dry tongue. NC/AT. PERRL. Not compliant with EOM examination.  Cardiac: tachycardic, regular rhythm, no murmurs  Pulm:  normal respiratory effort on room air, equal chest expansion, clear bilaterally, no wheeze or crackles  GI: soft, nontender, nondistended, +BS  : no loss of bladder control  Extremities:  moves all extremities freely, no edema noted  Skin: warm, well-perfused, no lesions noted on exposed skin. No external signs of trauma  Neuro:  awake, answers yes/no questions with repeated prompting, moves all 4 extremities freely and independently, does not follow commands. Unable to assess strength/sensation.     DDX: Includes but  not limited to status epilepticus is send secondary to UTI versus pneumonia versus COVID/flu or other viremia versus medication noncompliance versus ICH or mass effect versus electrolyte derangement versus alcohol use versus other.    Assessment/Plan/MDM  Patient is a 48-year-old female with a history of chronic pancreatitis secondary to alcohol use disorder complicated by pancreatic insufficiency, splenic artery rupture status post coil embolization (2019), chronic portal vein thrombosis, previous MCA stroke, bipolar disorder, generalized tonic-clonic seizures (reportedly not compliant with home Keppra) who presented via EMS from friend's home with altered mental status.  Patient initially with significant lactic acidosis, however lactate clearing throughout ED stay.  There is no leukocytosis, hemoglobin is stable .  No gross electrolyte derangement.  Lipase within normal limits.  Patient with mild hypomagnesemia.  Ammonia slightly elevated compared from baseline.  Acute toxicology panel negative.  Keppra level is undetectable.  No evidence of UTI on UA.  Patient with positive cannabinoids on drug screen, no other evidence of toxic ingestion.  Patient is COVID-positive, flu negative.  Chest x-ray without focal infiltrate. Patient more coherent compared to ED presentation.  Patient states she lives alone in an apartment, she has adult children who do not live with her.  She states she has not missed her Keppra doses as she has not been feeling well over the past 3 days.  Patient is COVID-positive which is most likely the analogy of her generalized malaise.  Neuro epilepsy consulted for disposition planning.  Patient Keppra loaded on ED arrival.  Neuroteam recommended CT head which has been ordered.  Patient signed out in stable condition pending imaging and okay admission team.    EKG shows normal sinus rhythm, rate 99, normal axis, normal IN intervals, upper limit of normal QTc, poor R wave progression, mild motion  artifact, grossly unchanged from prior (June 1, 2023)    ED Course/Progress:    ED Course as of 01/01/24 1018   Wed Dec 27, 2023   0104 Levetiracetam [EG]   0105 Levetiracetam [EG]      ED Course User Index  [EG] Vivian Clark MD         Diagnoses as of 01/01/24 1018   COVID-19   Breakthrough seizure (CMS/HCC)   Alcohol abuse        Clinical Impression: as above  Dispo: deferred to oncoming provider    Pt seen and discussed with attending physician, Dr. Lesley Clark MD  PGY3, Emergency Medicine    Disclaimer: This note was dictated by speech recognition. An attempt at proof reading was made to minimize errors. Errors in transcription may be present.  Please call if questions.      Vivian Clark MD  Resident  01/01/24 1022

## 2024-01-09 ENCOUNTER — OFFICE VISIT (OUTPATIENT)
Dept: PRIMARY CARE | Facility: CLINIC | Age: 49
End: 2024-01-09
Payer: COMMERCIAL

## 2024-01-09 VITALS
BODY MASS INDEX: 18.95 KG/M2 | DIASTOLIC BLOOD PRESSURE: 82 MMHG | WEIGHT: 107 LBS | TEMPERATURE: 97.7 F | SYSTOLIC BLOOD PRESSURE: 129 MMHG | HEART RATE: 97 BPM

## 2024-01-09 DIAGNOSIS — Z11.3 ROUTINE SCREENING FOR STI (SEXUALLY TRANSMITTED INFECTION): ICD-10-CM

## 2024-01-09 DIAGNOSIS — Z09 HOSPITAL DISCHARGE FOLLOW-UP: Primary | ICD-10-CM

## 2024-01-09 DIAGNOSIS — F10.10 ALCOHOL ABUSE: ICD-10-CM

## 2024-01-09 DIAGNOSIS — I10 BENIGN HYPERTENSION: ICD-10-CM

## 2024-01-09 DIAGNOSIS — N89.8 VAGINAL DISCHARGE: ICD-10-CM

## 2024-01-09 DIAGNOSIS — G40.909 SEIZURE DISORDER (MULTI): ICD-10-CM

## 2024-01-09 LAB
CLUE CELLS SPEC QL WET PREP: NORMAL
T VAGINALIS SPEC QL WET PREP: NORMAL
TRICHOMONAS REFLEX COMMENT: NORMAL
WBC VAG QL WET PREP: NORMAL
YEAST VAG QL WET PREP: NORMAL

## 2024-01-09 PROCEDURE — 87661 TRICHOMONAS VAGINALIS AMPLIF: CPT

## 2024-01-09 PROCEDURE — 3079F DIAST BP 80-89 MM HG: CPT | Performed by: STUDENT IN AN ORGANIZED HEALTH CARE EDUCATION/TRAINING PROGRAM

## 2024-01-09 PROCEDURE — 87210 SMEAR WET MOUNT SALINE/INK: CPT

## 2024-01-09 PROCEDURE — 3074F SYST BP LT 130 MM HG: CPT | Performed by: STUDENT IN AN ORGANIZED HEALTH CARE EDUCATION/TRAINING PROGRAM

## 2024-01-09 PROCEDURE — 87800 DETECT AGNT MULT DNA DIREC: CPT

## 2024-01-09 PROCEDURE — 99213 OFFICE O/P EST LOW 20 MIN: CPT | Performed by: STUDENT IN AN ORGANIZED HEALTH CARE EDUCATION/TRAINING PROGRAM

## 2024-01-09 RX ORDER — LANOLIN ALCOHOL/MO/W.PET/CERES
100 CREAM (GRAM) TOPICAL DAILY
Qty: 90 TABLET | Refills: 3 | Status: SHIPPED | OUTPATIENT
Start: 2024-01-09 | End: 2025-01-08

## 2024-01-09 RX ORDER — FOLIC ACID 1 MG/1
1 TABLET ORAL DAILY
Qty: 90 TABLET | Refills: 3 | Status: SHIPPED | OUTPATIENT
Start: 2024-01-09 | End: 2025-01-08

## 2024-01-09 RX ORDER — METOPROLOL SUCCINATE 50 MG/1
50 TABLET, EXTENDED RELEASE ORAL DAILY
Qty: 90 TABLET | Refills: 3 | Status: SHIPPED | OUTPATIENT
Start: 2024-01-09 | End: 2025-01-08

## 2024-01-09 RX ORDER — LEVETIRACETAM 1000 MG/1
1000 TABLET ORAL 2 TIMES DAILY
Qty: 180 TABLET | Refills: 3 | Status: SHIPPED | OUTPATIENT
Start: 2024-01-09 | End: 2025-01-08

## 2024-01-09 NOTE — PROGRESS NOTES
Subjective   48 y.o. F with h/o HTN, thrombocytopenia, EtOH pancreatitis, portal vein thrombosis, bipolar disorder/PTSD, ischemic CVA of temporal lobe, seizure disorder, asthma, active smoking presents for hospitalization follow-up.     # Hospital follow-up  Pt was admitted for alcohol withdrawal induced seizure, found to have COVID-19 with cough and congestion during admission. At the time of admission, the Keppra level was 0.   Since discharge, reports that she has been taking Keppra, now is given her medications in pre-packaged daily portions which has been helping with adherence.     # Cough and congestion  She was not aware that she had COVID-19, does not think she took Paxlovid. Continuing to have cough and congestion, SOB, occasional v/d. No fever or chills. Taking albuterol inhaler q6-7 hours. Denies facial pain or pressure.     # Vaginal irritation  Notes that she has had vaginal itching and irritation as well as discharge. She tested positive for candida recently and feels that it did not clear completely.    12 system ROS completed, negative except as noted above.    Past Medical History:   Diagnosis Date    Alcohol dependence, in remission (CMS/HCC)     Alcohol dependence in remission    Alcoholic ketoacidosis 09/14/2020    Bacterial vaginosis 02/26/2023    Encounter for follow-up examination after completed treatment for conditions other than malignant neoplasm 01/24/2019    Surgery follow-up    Encounter for screening for infections with a predominantly sexual mode of transmission 07/02/2021    Screen for STD (sexually transmitted disease)    Encounter for screening for malignant neoplasm of cervix 07/02/2021    Cervical cancer screening    Epilepsy, unspecified, not intractable, without status epilepticus (CMS/HCC)     Epileptic    Metabolic acidosis, normal anion gap (NAG) 10/09/2020    Personal history of diseases of the blood and blood-forming organs and certain disorders involving the immune  mechanism 05/26/2021    History of anemia    Personal history of other diseases of the circulatory system     History of cardiomyopathy    Personal history of other diseases of the digestive system     History of chronic pancreatitis    Personal history of other diseases of the female genital tract 07/02/2021    History of vaginal pruritus    Personal history of other mental and behavioral disorders     History of post traumatic stress disorder    Personal history of other specified conditions     History of diarrhea    Personal history of other specified conditions 08/06/2020    History of itching    Personal history of other specified conditions 07/13/2017    History of fatigue    Rhabdomyolysis 04/14/2016    Formatting of this note might be different from the original. - CK >7000 on admission likely secondary to volume depletion vs. Withdrawal vs. Intoxication - Contributing to AINSLEY - Fluid resuscitation, will continue mIVF - Trend CK Last Assessment & Plan: Formatting of this note might be different from the original. In the setting of proximal muscle weakness, difficulty maintaining upgaze. Baseline     Tremor, unspecified 05/30/2017    Spells of trembling    Unspecified protein-calorie malnutrition (CMS/[facility]) 01/03/2019    Malnutrition      Current Outpatient Medications on File Prior to Visit   Medication Sig Dispense Refill    acetaminophen (Tylenol) 325 mg tablet TAKE 3 TABLETS BY MOUTH EVERY 6 HOURS, AS NEEDED FOR SEVERE PAIN (7-10) 240 tablet 0    acetaminophen (Tylenol) 500 mg tablet Take 2 tablets (1,000 mg) by mouth every 6 hours if needed.      albuterol 90 mcg/actuation inhaler Inhale 1 puff every 4 hours if needed for wheezing. 18 g 3    amLODIPine (Norvasc) 5 mg tablet Take 1 tablet (5 mg) by mouth once daily. 90 tablet 3    aspirin 81 mg EC tablet Take 1 tablet (81 mg) by mouth once daily. 90 tablet 3    cholecalciferol (Vitamin D-3) 50 MCG (2000 UT) tablet Take 1 tablet (50 mcg) by mouth once  daily. 90 tablet 3    cyanocobalamin (Vitamin B-12) 1,000 mcg tablet Take 1 tablet (1,000 mcg) by mouth once daily. as directed 30 tablet 10    dicyclomine (Bentyl) 10 mg capsule Take 1 capsule (10 mg) by mouth 3 times a day. 90 capsule 11    docusate sodium (Colace) 100 mg capsule Take 1 capsule (100 mg) by mouth 2 times a day as needed for constipation. 60 capsule 11    famotidine (Pepcid) 20 mg tablet Take 1 tablet (20 mg) by mouth 2 times a day as needed for heartburn. 60 tablet 3    fluticasone (Flonase) 50 mcg/actuation nasal spray USE 2 SPRAYS IN EACH NOSTRIL ONCE DAILY. SHAKE GENTLY. BEFORE FIRST USE, PRIME PUMP. AFTER USE, CLEAN TIP AND REPLACE CAPSULE. 16 g 5    gabapentin (Neurontin) 300 mg capsule Take 1 capsule (300 mg) by mouth once daily. 90 capsule 3    guaiFENesin (Mucinex) 600 mg 12 hr tablet Take 2 tablets (1,200 mg) by mouth 2 times a day as needed for cough. Do not crush, chew, or split. 30 tablet 3    ibuprofen 800 mg tablet Take 1 tablet (800 mg) by mouth every 8 hours if needed for moderate pain (4 - 6).      lipase-protease-amylase (Creon) 24,000-76,000 -120,000 unit capsule Take 1 capsule by mouth 3 times a day with meals. 90 capsule 11    melatonin 3 mg tablet Take 1 tablet (3 mg) by mouth once daily at bedtime. 90 tablet 3    mirtazapine (Remeron) 7.5 mg tablet Take 1 tablet (7.5 mg) by mouth once daily at bedtime. 90 tablet 3    multivit-min-iron fum-folic ac 7.5 mg iron-400 mcg tablet Take 1 tablet by mouth once daily. 90 tablet 3    naltrexone (Depade) 50 mg tablet Take 1 tablet (50 mg) by mouth once daily. Increase to 2 tablets once daily if still getting cravings after 1 week 90 tablet 3    ondansetron ODT (Zofran-ODT) 4 mg disintegrating tablet Take 1 tablet (4 mg) by mouth every 8 hours if needed for nausea. 10 tablet 3    pantoprazole (ProtoNix) 40 mg EC tablet Take 1 tablet (40 mg) by mouth once daily in the morning. Take before meals. 30 tablet 0    rosuvastatin (Crestor) 40 mg  tablet Take 1 tablet (40 mg) by mouth once daily. 90 tablet 3    [DISCONTINUED] folic acid (Folvite) 1 mg tablet Take 1 tablet (1 mg) by mouth once daily. Do not start before December 29, 2023. 90 tablet 3    [DISCONTINUED] levETIRAcetam (Keppra) 1,000 mg tablet Take 1 tablet (1,000 mg) by mouth 2 times a day. 180 tablet 3    [DISCONTINUED] metoprolol succinate XL (Toprol-XL) 50 mg 24 hr tablet TAKE 1 TABLET BY MOUTH ONCE DAILY 30 tablet 0    [DISCONTINUED] nirmatrelvir-ritonavir (Paxlovid) 300 mg (150 mg x 2)-100 mg tablet therapy pack Take 3 tablets by mouth 2 times a day. Follow the instructions on the package 1 Dose pack 0    [DISCONTINUED] thiamine (Vitamin B-1) 100 mg tablet Take 1 tablet (100 mg) by mouth once daily. Do not start before December 30, 2023. 90 tablet 3    [DISCONTINUED] disulfiram (Antabuse) 250 mg tablet Take 1 tablet (250 mg) by mouth once daily. 30 tablet 11    [DISCONTINUED] loratadine (Claritin) 10 mg tablet Take 1 tablet (10 mg) by mouth once daily. 90 tablet 3     No current facility-administered medications on file prior to visit.      Objective   Vitals:    01/09/24 1551   BP: 129/82   Pulse: 97   Temp: 36.5 °C (97.7 °F)     Physical Exam  Exam conducted with a chaperone present (medical student Mame).   Constitutional:       Appearance: Normal appearance.   HENT:      Head: Normocephalic.      Right Ear: External ear normal.      Left Ear: External ear normal.      Nose: Nose normal.      Mouth/Throat:      Mouth: Mucous membranes are moist.   Cardiovascular:      Rate and Rhythm: Normal rate and regular rhythm.      Heart sounds: No murmur heard.     No friction rub. No gallop.   Pulmonary:      Effort: Pulmonary effort is normal. No respiratory distress.      Breath sounds: Normal breath sounds. No rhonchi or rales.   Abdominal:      General: Abdomen is flat.   Genitourinary:     General: Normal vulva.      Exam position: Supine.      Pubic Area: No rash.       Labia:          Right: No rash.         Left: No rash.       Comments: Wet prep and GCT collected just inside the introitus  Musculoskeletal:      Cervical back: Normal range of motion.      Right lower leg: No edema.      Left lower leg: No edema.   Skin:     General: Skin is warm and dry.   Neurological:      Mental Status: She is alert and oriented to person, place, and time. Mental status is at baseline.   Psychiatric:         Mood and Affect: Mood normal.         Behavior: Behavior normal.       Assessment/Plan    Shi Trevino is a 48 y.o. F presenting for hospitalization follow-up after being admitted for alcohol withdrawal induced seizure from 12/26 - 12/28. She was also diagnosed with COVID-19 during her admission. Doing better now, but continuing to have congestion and cough and using albuterol q6h. Given lack of facial pressure or fevers and clear lung exam today, sx likely due to resolving viral infection.    # Hospital discharge follow up  # alcohol withdrawal induced seizures  - medication reconciliation performed, sent new meds to usual pharmacy  - Encouraged patient to continue alcohol cessation, acknowledged her efforts.  - Continue Keppra.   - Next visit, should check in on her strategy/program for alcohol cessation.  - Follow up with Neuro, she will reschedule as she missed her appt today.    # Vaginal irritation  - Swab sent for candida, trich, GC/chlam; no gross abnormalities on exam today    Problem List Items Addressed This Visit       Benign hypertension    Relevant Medications    metoprolol succinate XL (Toprol-XL) 50 mg 24 hr tablet     Other Visit Diagnoses       Hospital discharge follow-up    -  Primary    Seizure disorder (CMS/HCC)        Relevant Medications    levETIRAcetam (Keppra) 1,000 mg tablet    Alcohol abuse        Relevant Medications    folic acid (Folvite) 1 mg tablet    thiamine (Vitamin B-1) 100 mg tablet    Vaginal discharge        Relevant Orders    Trichomonas Wet Prep Reflex to PCR  (Completed)    Trichomonas vaginalis, Nucleic Acid Detection    Routine screening for STI (sexually transmitted infection)        Relevant Orders    C. Trachomatis / N. Gonorrhoeae, Amplified Detection (Completed)          Recommend close follow-up with PCP, in 2 weeks.    This patient was seen and discussed with Dr. Tellez and discussed with Dr. Vega.      Mame Santana, MS-3    ---    I saw and evaluated the patient with the medical student. I personally obtained the key and critical portions of the history and physical exam. I reviewed and modified the student's documentation and discussed patient with the medical student. I agree with the above documentation and medical decision making.     Roxanna Tellez MD  Family Medicine PGY-3

## 2024-01-10 DIAGNOSIS — K86.0 ALCOHOL-INDUCED CHRONIC PANCREATITIS (MULTI): ICD-10-CM

## 2024-01-10 DIAGNOSIS — F17.210 CIGARETTE NICOTINE DEPENDENCE WITHOUT COMPLICATION: ICD-10-CM

## 2024-01-10 LAB
C TRACH RRNA SPEC QL NAA+PROBE: NEGATIVE
N GONORRHOEA DNA SPEC QL PROBE+SIG AMP: NEGATIVE

## 2024-01-11 LAB — T VAGINALIS RRNA SPEC QL NAA+PROBE: NEGATIVE

## 2024-01-11 RX ORDER — GUAIFENESIN 600 MG/1
TABLET, EXTENDED RELEASE ORAL
Qty: 30 TABLET | Refills: 10 | Status: SHIPPED | OUTPATIENT
Start: 2024-01-11

## 2024-01-11 RX ORDER — ONDANSETRON 4 MG/1
TABLET, ORALLY DISINTEGRATING ORAL
Qty: 10 TABLET | Refills: 10 | Status: SHIPPED | OUTPATIENT
Start: 2024-01-11

## 2024-01-11 RX ORDER — FAMOTIDINE 20 MG/1
TABLET, FILM COATED ORAL
Qty: 60 TABLET | Refills: 10 | Status: SHIPPED | OUTPATIENT
Start: 2024-01-11

## 2024-01-11 NOTE — PROGRESS NOTES
I reviewed the resident/fellow's documentation and discussed the patient with the resident/fellow. I agree with the resident/fellow's medical decision making as documented in the note.    Tip Vega MD

## 2024-01-12 ENCOUNTER — TELEPHONE (OUTPATIENT)
Dept: PRIMARY CARE | Facility: CLINIC | Age: 49
End: 2024-01-12
Payer: COMMERCIAL

## 2024-01-12 DIAGNOSIS — N89.8 VAGINAL ITCHING: Primary | ICD-10-CM

## 2024-01-12 RX ORDER — ESTRADIOL 0.1 MG/G
2 CREAM VAGINAL DAILY
Qty: 42.5 G | Refills: 3 | Status: SHIPPED | OUTPATIENT
Start: 2024-01-12 | End: 2024-01-12 | Stop reason: ENTERED-IN-ERROR

## 2024-01-13 NOTE — TELEPHONE ENCOUNTER
Called patient, spoke for approx 5min. Discussed negative results of swabs. Patient concerned that she's still having itching, no longer having periods. Discussed that post-menopausal atrophy can sometimes be a cause, could try vaginal estrogen, patient interested, although does have history of portal venin thrombosis and stroke. Recommend referral to Obgyn for further assessment of vaginal itching, left VM for patient to schedule appointment with them.    Roxanna Tellez MD  Family Medicine PGY-3

## 2024-01-18 ENCOUNTER — OFFICE VISIT (OUTPATIENT)
Dept: DERMATOLOGY | Facility: CLINIC | Age: 49
End: 2024-01-18
Payer: COMMERCIAL

## 2024-01-18 DIAGNOSIS — R21 RASH AND OTHER NONSPECIFIC SKIN ERUPTION: Primary | ICD-10-CM

## 2024-01-18 PROCEDURE — 99203 OFFICE O/P NEW LOW 30 MIN: CPT | Performed by: DERMATOLOGY

## 2024-01-18 RX ORDER — TRIAMCINOLONE ACETONIDE 1 MG/G
OINTMENT TOPICAL 2 TIMES DAILY PRN
Qty: 60 G | Refills: 2 | Status: SHIPPED | OUTPATIENT
Start: 2024-01-18

## 2024-01-18 RX ORDER — MULTIVITAMIN
TABLET ORAL
COMMUNITY
Start: 2016-09-14

## 2024-01-18 ASSESSMENT — ITCH NUMERIC RATING SCALE: HOW SEVERE IS YOUR ITCHING?: 10

## 2024-01-18 NOTE — PROGRESS NOTES
Subjective     Shi Trevino is a 48 y.o. female who presents for the following: . Rash on left leg. Just noticed it yesterday.  Denies treatment.  Itches a lot. She uses suave lotion which does not help. Uses Oil of Olay soap.     Review of Systems:  No other skin or systemic complaints other than what is documented elsewhere in the note.    The following portions of the chart were reviewed this encounter and updated as appropriate:          Skin Cancer History  No skin cancer on file.      Specialty Problems    None       Objective   Well appearing patient in no apparent distress; mood and affect are within normal limits.    A focused skin examination was performed. All findings within normal limits unless otherwise noted below.    Assessment/Plan

## 2024-01-23 ENCOUNTER — APPOINTMENT (OUTPATIENT)
Dept: OPHTHALMOLOGY | Facility: CLINIC | Age: 49
End: 2024-01-23
Payer: COMMERCIAL

## 2024-04-25 ENCOUNTER — APPOINTMENT (OUTPATIENT)
Dept: RADIOLOGY | Facility: HOSPITAL | Age: 49
End: 2024-04-25
Payer: COMMERCIAL

## 2024-04-25 ENCOUNTER — HOSPITAL ENCOUNTER (EMERGENCY)
Facility: HOSPITAL | Age: 49
Discharge: HOME | End: 2024-04-26
Attending: EMERGENCY MEDICINE
Payer: COMMERCIAL

## 2024-04-25 ENCOUNTER — CLINICAL SUPPORT (OUTPATIENT)
Dept: EMERGENCY MEDICINE | Facility: HOSPITAL | Age: 49
End: 2024-04-25
Payer: COMMERCIAL

## 2024-04-25 DIAGNOSIS — R56.9 SEIZURE (MULTI): Primary | ICD-10-CM

## 2024-04-25 LAB
ALBUMIN SERPL BCP-MCNC: 4 G/DL (ref 3.4–5)
ALBUMIN SERPL BCP-MCNC: 4.6 G/DL (ref 3.4–5)
ALP SERPL-CCNC: 110 U/L (ref 33–110)
ALP SERPL-CCNC: 131 U/L (ref 33–110)
ALT SERPL W P-5'-P-CCNC: 29 U/L (ref 7–45)
ALT SERPL W P-5'-P-CCNC: 32 U/L (ref 7–45)
ANION GAP BLDV CALCULATED.4IONS-SCNC: 21 MMOL/L (ref 10–25)
ANION GAP SERPL CALC-SCNC: 22 MMOL/L (ref 10–20)
ANION GAP SERPL CALC-SCNC: 26 MMOL/L (ref 10–20)
APAP SERPL-MCNC: <10 UG/ML
AST SERPL W P-5'-P-CCNC: 40 U/L (ref 9–39)
AST SERPL W P-5'-P-CCNC: 46 U/L (ref 9–39)
BASE EXCESS BLDV CALC-SCNC: -5.8 MMOL/L (ref -2–3)
BASOPHILS # BLD AUTO: 0.04 X10*3/UL (ref 0–0.1)
BASOPHILS NFR BLD AUTO: 0.6 %
BILIRUB SERPL-MCNC: 0.6 MG/DL (ref 0–1.2)
BILIRUB SERPL-MCNC: 0.7 MG/DL (ref 0–1.2)
BODY TEMPERATURE: 37 DEGREES CELSIUS
BUN SERPL-MCNC: 11 MG/DL (ref 6–23)
BUN SERPL-MCNC: 14 MG/DL (ref 6–23)
CA-I BLDV-SCNC: 1.28 MMOL/L (ref 1.1–1.33)
CALCIUM SERPL-MCNC: 10.3 MG/DL (ref 8.6–10.6)
CALCIUM SERPL-MCNC: 9.6 MG/DL (ref 8.6–10.6)
CHLORIDE BLDV-SCNC: 99 MMOL/L (ref 98–107)
CHLORIDE SERPL-SCNC: 100 MMOL/L (ref 98–107)
CHLORIDE SERPL-SCNC: 98 MMOL/L (ref 98–107)
CO2 SERPL-SCNC: 17 MMOL/L (ref 21–32)
CO2 SERPL-SCNC: 20 MMOL/L (ref 21–32)
CREAT SERPL-MCNC: 0.76 MG/DL (ref 0.5–1.05)
CREAT SERPL-MCNC: 1 MG/DL (ref 0.5–1.05)
EGFRCR SERPLBLD CKD-EPI 2021: 69 ML/MIN/1.73M*2
EGFRCR SERPLBLD CKD-EPI 2021: >90 ML/MIN/1.73M*2
EOSINOPHIL # BLD AUTO: 0.03 X10*3/UL (ref 0–0.7)
EOSINOPHIL NFR BLD AUTO: 0.5 %
ERYTHROCYTE [DISTWIDTH] IN BLOOD BY AUTOMATED COUNT: 13.1 % (ref 11.5–14.5)
ETHANOL SERPL-MCNC: <10 MG/DL
FLUAV RNA RESP QL NAA+PROBE: NOT DETECTED
FLUBV RNA RESP QL NAA+PROBE: NOT DETECTED
GLUCOSE BLDV-MCNC: 254 MG/DL (ref 74–99)
GLUCOSE SERPL-MCNC: 232 MG/DL (ref 74–99)
GLUCOSE SERPL-MCNC: 76 MG/DL (ref 74–99)
HAV IGM SER QL: NONREACTIVE
HBV CORE IGM SER QL: NONREACTIVE
HBV SURFACE AG SERPL QL IA: NONREACTIVE
HCO3 BLDV-SCNC: 19.5 MMOL/L (ref 22–26)
HCT VFR BLD AUTO: 36.8 % (ref 36–46)
HCT VFR BLD EST: 42 % (ref 36–46)
HCV AB SER QL: NONREACTIVE
HGB BLD-MCNC: 13.5 G/DL (ref 12–16)
HGB BLDV-MCNC: 13.9 G/DL (ref 12–16)
HOLD SPECIMEN: NORMAL
HOLD SPECIMEN: NORMAL
IMM GRANULOCYTES # BLD AUTO: 0.01 X10*3/UL (ref 0–0.7)
IMM GRANULOCYTES NFR BLD AUTO: 0.2 % (ref 0–0.9)
INHALED O2 CONCENTRATION: 21 %
LACTATE BLDV-SCNC: 2.3 MMOL/L (ref 0.4–2)
LACTATE BLDV-SCNC: 4.9 MMOL/L (ref 0.4–2)
LACTATE BLDV-SCNC: 6.8 MMOL/L (ref 0.4–2)
LEVETIRACETAM SERPL-MCNC: <2 UG/ML (ref 10–40)
LIPASE SERPL-CCNC: 21 U/L (ref 9–82)
LYMPHOCYTES # BLD AUTO: 0.86 X10*3/UL (ref 1.2–4.8)
LYMPHOCYTES NFR BLD AUTO: 13.7 %
MAGNESIUM SERPL-MCNC: 2.07 MG/DL (ref 1.6–2.4)
MCH RBC QN AUTO: 32.6 PG (ref 26–34)
MCHC RBC AUTO-ENTMCNC: 36.7 G/DL (ref 32–36)
MCV RBC AUTO: 89 FL (ref 80–100)
MONOCYTES # BLD AUTO: 0.41 X10*3/UL (ref 0.1–1)
MONOCYTES NFR BLD AUTO: 6.5 %
NEUTROPHILS # BLD AUTO: 4.95 X10*3/UL (ref 1.2–7.7)
NEUTROPHILS NFR BLD AUTO: 78.5 %
NRBC BLD-RTO: 0 /100 WBCS (ref 0–0)
OXYHGB MFR BLDV: 48 % (ref 45–75)
PCO2 BLDV: 37 MM HG (ref 41–51)
PH BLDV: 7.33 PH (ref 7.33–7.43)
PHOSPHATE SERPL-MCNC: 3.1 MG/DL (ref 2.5–4.9)
PLATELET # BLD AUTO: 132 X10*3/UL (ref 150–450)
PO2 BLDV: 36 MM HG (ref 35–45)
POTASSIUM BLDV-SCNC: 4.7 MMOL/L (ref 3.5–5.3)
POTASSIUM SERPL-SCNC: 4.5 MMOL/L (ref 3.5–5.3)
POTASSIUM SERPL-SCNC: 5 MMOL/L (ref 3.5–5.3)
PREGNANCY TEST URINE, POC: NEGATIVE
PROT SERPL-MCNC: 8.2 G/DL (ref 6.4–8.2)
PROT SERPL-MCNC: 9.3 G/DL (ref 6.4–8.2)
RBC # BLD AUTO: 4.14 X10*6/UL (ref 4–5.2)
SALICYLATES SERPL-MCNC: <3 MG/DL
SAO2 % BLDV: 49 % (ref 45–75)
SARS-COV-2 RNA RESP QL NAA+PROBE: NOT DETECTED
SODIUM BLDV-SCNC: 135 MMOL/L (ref 136–145)
SODIUM SERPL-SCNC: 136 MMOL/L (ref 136–145)
SODIUM SERPL-SCNC: 137 MMOL/L (ref 136–145)
TREPONEMA PALLIDUM IGG+IGM AB [PRESENCE] IN SERUM OR PLASMA BY IMMUNOASSAY: NONREACTIVE
WBC # BLD AUTO: 6.3 X10*3/UL (ref 4.4–11.3)

## 2024-04-25 PROCEDURE — 84132 ASSAY OF SERUM POTASSIUM: CPT

## 2024-04-25 PROCEDURE — 87636 SARSCOV2 & INF A&B AMP PRB: CPT

## 2024-04-25 PROCEDURE — 74177 CT ABD & PELVIS W/CONTRAST: CPT

## 2024-04-25 PROCEDURE — 93010 ELECTROCARDIOGRAM REPORT: CPT | Performed by: EMERGENCY MEDICINE

## 2024-04-25 PROCEDURE — 74177 CT ABD & PELVIS W/CONTRAST: CPT | Performed by: STUDENT IN AN ORGANIZED HEALTH CARE EDUCATION/TRAINING PROGRAM

## 2024-04-25 PROCEDURE — 84100 ASSAY OF PHOSPHORUS: CPT

## 2024-04-25 PROCEDURE — 93005 ELECTROCARDIOGRAM TRACING: CPT

## 2024-04-25 PROCEDURE — 99285 EMERGENCY DEPT VISIT HI MDM: CPT | Performed by: EMERGENCY MEDICINE

## 2024-04-25 PROCEDURE — 99285 EMERGENCY DEPT VISIT HI MDM: CPT | Mod: 25

## 2024-04-25 PROCEDURE — 2500000004 HC RX 250 GENERAL PHARMACY W/ HCPCS (ALT 636 FOR OP/ED): Mod: SE

## 2024-04-25 PROCEDURE — 83735 ASSAY OF MAGNESIUM: CPT

## 2024-04-25 PROCEDURE — 96375 TX/PRO/DX INJ NEW DRUG ADDON: CPT

## 2024-04-25 PROCEDURE — 85025 COMPLETE CBC W/AUTO DIFF WBC: CPT

## 2024-04-25 PROCEDURE — 86780 TREPONEMA PALLIDUM: CPT

## 2024-04-25 PROCEDURE — 2550000001 HC RX 255 CONTRASTS: Mod: SE | Performed by: EMERGENCY MEDICINE

## 2024-04-25 PROCEDURE — 80143 DRUG ASSAY ACETAMINOPHEN: CPT

## 2024-04-25 PROCEDURE — 83690 ASSAY OF LIPASE: CPT

## 2024-04-25 PROCEDURE — 36415 COLL VENOUS BLD VENIPUNCTURE: CPT

## 2024-04-25 PROCEDURE — 2500000001 HC RX 250 WO HCPCS SELF ADMINISTERED DRUGS (ALT 637 FOR MEDICARE OP): Mod: SE

## 2024-04-25 PROCEDURE — 76377 3D RENDER W/INTRP POSTPROCES: CPT

## 2024-04-25 PROCEDURE — 83605 ASSAY OF LACTIC ACID: CPT

## 2024-04-25 PROCEDURE — 96374 THER/PROPH/DIAG INJ IV PUSH: CPT

## 2024-04-25 PROCEDURE — 81025 URINE PREGNANCY TEST: CPT

## 2024-04-25 PROCEDURE — 70486 CT MAXILLOFACIAL W/O DYE: CPT

## 2024-04-25 PROCEDURE — 80177 DRUG SCRN QUAN LEVETIRACETAM: CPT

## 2024-04-25 PROCEDURE — 80074 ACUTE HEPATITIS PANEL: CPT

## 2024-04-25 PROCEDURE — 70450 CT HEAD/BRAIN W/O DYE: CPT

## 2024-04-25 PROCEDURE — 72125 CT NECK SPINE W/O DYE: CPT

## 2024-04-25 PROCEDURE — 96361 HYDRATE IV INFUSION ADD-ON: CPT

## 2024-04-25 RX ORDER — KETOROLAC TROMETHAMINE 15 MG/ML
15 INJECTION, SOLUTION INTRAMUSCULAR; INTRAVENOUS ONCE
Status: COMPLETED | OUTPATIENT
Start: 2024-04-25 | End: 2024-04-25

## 2024-04-25 RX ORDER — MORPHINE SULFATE 4 MG/ML
4 INJECTION INTRAVENOUS ONCE
Status: COMPLETED | OUTPATIENT
Start: 2024-04-25 | End: 2024-04-25

## 2024-04-25 RX ORDER — MORPHINE SULFATE 4 MG/ML
INJECTION INTRAVENOUS
Status: COMPLETED
Start: 2024-04-25 | End: 2024-04-25

## 2024-04-25 RX ORDER — MULTIVIT-MIN/IRON FUM/FOLIC AC 7.5 MG-4
1 TABLET ORAL DAILY
Status: DISCONTINUED | OUTPATIENT
Start: 2024-04-25 | End: 2024-04-26 | Stop reason: HOSPADM

## 2024-04-25 RX ORDER — ONDANSETRON HYDROCHLORIDE 2 MG/ML
4 INJECTION, SOLUTION INTRAVENOUS ONCE
Status: COMPLETED | OUTPATIENT
Start: 2024-04-25 | End: 2024-04-25

## 2024-04-25 RX ORDER — LEVETIRACETAM 15 MG/ML
1500 INJECTION INTRAVASCULAR ONCE
Status: COMPLETED | OUTPATIENT
Start: 2024-04-25 | End: 2024-04-25

## 2024-04-25 RX ORDER — ACETAMINOPHEN 325 MG/1
975 TABLET ORAL ONCE
Status: COMPLETED | OUTPATIENT
Start: 2024-04-25 | End: 2024-04-25

## 2024-04-25 RX ORDER — LANOLIN ALCOHOL/MO/W.PET/CERES
100 CREAM (GRAM) TOPICAL DAILY
Status: DISCONTINUED | OUTPATIENT
Start: 2024-04-28 | End: 2024-04-26 | Stop reason: HOSPADM

## 2024-04-25 RX ORDER — THIAMINE HYDROCHLORIDE 100 MG/ML
100 INJECTION, SOLUTION INTRAMUSCULAR; INTRAVENOUS DAILY
Status: DISCONTINUED | OUTPATIENT
Start: 2024-04-25 | End: 2024-04-26 | Stop reason: HOSPADM

## 2024-04-25 RX ORDER — FOLIC ACID 1 MG/1
1 TABLET ORAL DAILY
Status: DISCONTINUED | OUTPATIENT
Start: 2024-04-25 | End: 2024-04-26 | Stop reason: HOSPADM

## 2024-04-25 RX ADMIN — LEVETIRACETAM 1500 MG: 15 INJECTION INTRAVENOUS at 19:06

## 2024-04-25 RX ADMIN — FOLIC ACID 1 MG: 1 TABLET ORAL at 18:44

## 2024-04-25 RX ADMIN — SODIUM CHLORIDE, POTASSIUM CHLORIDE, SODIUM LACTATE AND CALCIUM CHLORIDE 1000 ML: 600; 310; 30; 20 INJECTION, SOLUTION INTRAVENOUS at 18:35

## 2024-04-25 RX ADMIN — MORPHINE SULFATE 4 MG: 4 INJECTION INTRAVENOUS at 19:56

## 2024-04-25 RX ADMIN — ONDANSETRON 4 MG: 2 INJECTION INTRAMUSCULAR; INTRAVENOUS at 17:05

## 2024-04-25 RX ADMIN — ACETAMINOPHEN 975 MG: 325 TABLET ORAL at 19:05

## 2024-04-25 RX ADMIN — SODIUM CHLORIDE, POTASSIUM CHLORIDE, SODIUM LACTATE AND CALCIUM CHLORIDE 1000 ML: 600; 310; 30; 20 INJECTION, SOLUTION INTRAVENOUS at 17:05

## 2024-04-25 RX ADMIN — KETOROLAC TROMETHAMINE 15 MG: 15 INJECTION, SOLUTION INTRAMUSCULAR; INTRAVENOUS at 19:05

## 2024-04-25 RX ADMIN — THIAMINE HYDROCHLORIDE 100 MG: 100 INJECTION, SOLUTION INTRAMUSCULAR; INTRAVENOUS at 18:44

## 2024-04-25 RX ADMIN — Medication 1 TABLET: at 18:44

## 2024-04-25 RX ADMIN — SODIUM CHLORIDE, POTASSIUM CHLORIDE, SODIUM LACTATE AND CALCIUM CHLORIDE 1000 ML: 600; 310; 30; 20 INJECTION, SOLUTION INTRAVENOUS at 19:35

## 2024-04-25 RX ADMIN — IOHEXOL 100 ML: 350 INJECTION, SOLUTION INTRAVENOUS at 22:14

## 2024-04-25 ASSESSMENT — COLUMBIA-SUICIDE SEVERITY RATING SCALE - C-SSRS
6. HAVE YOU EVER DONE ANYTHING, STARTED TO DO ANYTHING, OR PREPARED TO DO ANYTHING TO END YOUR LIFE?: NO
2. HAVE YOU ACTUALLY HAD ANY THOUGHTS OF KILLING YOURSELF?: NO
1. IN THE PAST MONTH, HAVE YOU WISHED YOU WERE DEAD OR WISHED YOU COULD GO TO SLEEP AND NOT WAKE UP?: NO

## 2024-04-25 ASSESSMENT — LIFESTYLE VARIABLES
TOTAL SCORE: 0
EVER HAD A DRINK FIRST THING IN THE MORNING TO STEADY YOUR NERVES TO GET RID OF A HANGOVER: NO
HAVE YOU EVER FELT YOU SHOULD CUT DOWN ON YOUR DRINKING: NO
EVER FELT BAD OR GUILTY ABOUT YOUR DRINKING: NO
HAVE PEOPLE ANNOYED YOU BY CRITICIZING YOUR DRINKING: NO

## 2024-04-25 ASSESSMENT — PAIN SCALES - GENERAL: PAINLEVEL_OUTOF10: 8

## 2024-04-25 ASSESSMENT — PAIN DESCRIPTION - LOCATION: LOCATION: HEAD

## 2024-04-25 NOTE — ED TRIAGE NOTES
Pt presents to ED via EMS for seizures. Per EMS pt has had 3 seizures in the last hours, none witnessed by EMS. Per pt family witness them, though she has no memory. Pt is poor historian and unable to explain past medical history and current condition. A&Ox4. Endorses N. Seizure precautions initiated. Chest rise and fall even and symmetrical. No signs of respiratory distress observed. Bed locked in low position. Call light within reach.

## 2024-04-25 NOTE — ED PROVIDER NOTES
HPI   Chief Complaint   Patient presents with    Seizures       Shi Trevino is a 48 y.o. female w/ PMHx of chronic pancreatitis 2/2 Alcohol Use Disorder c/b pancreatic insufficiency and pseudocyst, splenic artery rupture (s/p coil embolization in 2019), portal vein thrombosis, thrombocytopenia, hx of MCA stroke, asthma, bipolar, MDD, PTSD generalized clonic seizures (on maintenance keppra) who presents to the ED after a seizure per ems she had 2-3 witnessed seizures at home. She is unable to tell us anything about the situation. She states she hurts all over and is nauseous.                           No data recorded                Patient History   Past Medical History:   Diagnosis Date    Alcohol dependence, in remission (Multi)     Alcohol dependence in remission    Alcoholic ketoacidosis 09/14/2020    Bacterial vaginosis 02/26/2023    Encounter for follow-up examination after completed treatment for conditions other than malignant neoplasm 01/24/2019    Surgery follow-up    Encounter for screening for infections with a predominantly sexual mode of transmission 07/02/2021    Screen for STD (sexually transmitted disease)    Encounter for screening for malignant neoplasm of cervix 07/02/2021    Cervical cancer screening    Epilepsy, unspecified, not intractable, without status epilepticus (Multi)     Epileptic    Metabolic acidosis, normal anion gap (NAG) 10/09/2020    Personal history of diseases of the blood and blood-forming organs and certain disorders involving the immune mechanism 05/26/2021    History of anemia    Personal history of other diseases of the circulatory system     History of cardiomyopathy    Personal history of other diseases of the digestive system     History of chronic pancreatitis    Personal history of other diseases of the female genital tract 07/02/2021    History of vaginal pruritus    Personal history of other mental and behavioral disorders     History of post traumatic stress  disorder    Personal history of other specified conditions     History of diarrhea    Personal history of other specified conditions 08/06/2020    History of itching    Personal history of other specified conditions 07/13/2017    History of fatigue    Rhabdomyolysis 04/14/2016    Formatting of this note might be different from the original. - CK >7000 on admission likely secondary to volume depletion vs. Withdrawal vs. Intoxication - Contributing to AINSLEY - Fluid resuscitation, will continue mIVF - Trend CK Last Assessment & Plan: Formatting of this note might be different from the original. In the setting of proximal muscle weakness, difficulty maintaining upgaze. Baseline     Tremor, unspecified 05/30/2017    Spells of trembling    Unspecified protein-calorie malnutrition (Multi) 01/03/2019    Malnutrition     Past Surgical History:   Procedure Laterality Date    CT ABDOMEN PELVIS ANGIOGRAM W AND/OR WO IV CONTRAST  1/7/2019    CT ABDOMEN PELVIS ANGIOGRAM W AND/OR WO IV CONTRAST 1/7/2019 [facility]ICAL [facility]GAC[facility]    CT ABDOMEN PELVIS ANGIOGRAM W AND/OR WO IV CONTRAST  1/11/2019    CT ABDOMEN PELVIS ANGIOGRAM W AND/OR WO IV CONTRAST 1/11/2019 [facility] CL[facility]ICAL [facility]GAC[facility]    CT ABDOMEN PELVIS ANGIOGRAM W AND/OR WO IV CONTRAST  2/2/2019    CT ABDOMEN PELVIS ANGIOGRAM W AND/OR WO IV CONTRAST 2/2/2019 [facility]GAC[facility]    CT ABDOMEN PELVIS ANGIOGRAM W AND/OR WO IV CONTRAST  10/22/2018    CT ABDOMEN PELVIS ANGIOGRAM W AND/OR WO IV CONTRAST 10/22/2018 [facility]ICAL [facility]    IR ANGIOGRAM INFERIOR EPIGASTRIC  1/7/2019    IR ANGIOGRAM INFERIOR EPIGASTRIC 1/7/2019 [facility]GAC[facility]    IR ANGIOGRAM INFERIOR EPIGASTRIC PELVIC  1/7/2019    IR ANGIOGRAM INFERIOR EPIGASTRIC PELVIC 1/7/2019 [facility]    MR HEAD ANGIO WO IV CONTRAST  10/15/2018    MR HEAD ANGIO WO IV CONTRAST 10/15/2018 [facility]    MR NECK ANGIO WO IV CONTRAST  10/15/2018    MR NECK ANGIO WO IV CONTRAST 10/15/2018 [facility]    OTHER  SURGICAL HISTORY  12/20/2018    Cardiac catheterization    OTHER SURGICAL HISTORY  08/06/2020    Laparoscopy    OTHER SURGICAL HISTORY  08/06/2020    Hernia repair     Family History   Problem Relation Name Age of Onset    Diabetes Mother      Seizures Other       Social History     Tobacco Use    Smoking status: Every Day     Current packs/day: 0.25     Average packs/day: 0.3 packs/day for 16.3 years (4.1 ttl pk-yrs)     Types: Cigarettes     Start date: 1/1/2008    Smokeless tobacco: Never   Vaping Use    Vaping status: Never Used   Substance Use Topics    Alcohol use: Yes     Comment: socially    Drug use: Yes     Types: Marijuana       Physical Exam   ED Triage Vitals   Temp Pulse Resp BP   -- -- -- --      SpO2 Temp src Heart Rate Source Patient Position   -- -- -- --      BP Location FiO2 (%)     -- --       Physical Exam  Vitals and nursing note reviewed.   Constitutional:       General: She is not in acute distress.     Appearance: She is well-developed and normal weight. She is not diaphoretic.   HENT:      Head: Normocephalic and atraumatic.      Mouth/Throat:      Mouth: Mucous membranes are moist.   Eyes:      Extraocular Movements: Extraocular movements intact.      Conjunctiva/sclera: Conjunctivae normal.      Pupils: Pupils are equal, round, and reactive to light.   Cardiovascular:      Rate and Rhythm: Normal rate and regular rhythm.      Pulses: Normal pulses.      Heart sounds: Normal heart sounds. No murmur heard.  Pulmonary:      Effort: Pulmonary effort is normal. No respiratory distress.      Breath sounds: Normal breath sounds. No wheezing.   Abdominal:      General: Bowel sounds are normal.      Palpations: Abdomen is soft.      Tenderness: There is abdominal tenderness. There is guarding.   Musculoskeletal:         General: No swelling, tenderness, deformity or signs of injury.      Cervical back: Neck supple. No tenderness.   Skin:     General: Skin is warm and dry.      Capillary Refill:  Capillary refill takes less than 2 seconds.      Findings: No bruising.   Neurological:      General: No focal deficit present.      Mental Status: She is alert. Mental status is at baseline.      Sensory: No sensory deficit.      Motor: No weakness.   Psychiatric:         Mood and Affect: Mood normal.         ED Course & MDM   ED Course as of 04/29/24 0016   Thu Apr 25, 2024   1701 ECG 12 lead  EKG independently interpreted with sinus rhythm  Heart rate 98 bpm  IA interval 152 MS QRS 80 MS QTc 474 MS within normal limits.  No acute ST elevation or T wave inversion concerning for acute ischemia no significant change when compared to EKG from 12/26/2023 [SC]   1703 Blood Gas Venous Full Panel(!!)  Blood gas reviewed with normal pH and lactate of 4.9, elevated blood glucose at 254.  Bicarb decreased on blood gas at 19.5.  In setting of reported seizures elevated lactate most likely secondary to postictal state. [SC]   1803 On reevaluation patient remains ANO x 4 complaining of headache consistent with headaches that she has had in the past after having a seizure and abdominal pain that she says is severe but similar to pain that she has had in the past.  She feels less nauseous after the Zofran.  Given rising lactate will order CT scan to rule out intra-abdominal pathology.  Still believe could be related to seizure.  Patient endorses 1 pint of vodka consumed 24 hours ago.  Timing does not fit with alcohol withdrawal seizures, but will initiate thiamine with fluid resuscitation. [SC]   2025 ED Attending Documentation: This patient was seen by the resident physician.  I have seen and examined the patient, agree with the workup, evaluation, management and diagnosis. I reviewed and edited the above documentation where necessary. I have looked at the EKG and agree with the interpretation. On my evaluation of the patient: 48-year-old female who presents to the emergency department after witnessed seizures at home.  Her  lactate is elevated she is complaining of abdominal pain and her lactate is not clearing as expected, so a CT scan of her abdomen and pelvis was ordered.  She will get loaded with Keppra and fluid resuscitated here.    Carl Navarro MD  EM Attending Physician   [RG]      ED Course User Index  [RG] Carl Navarro MD  [SC] Debora Wynne DO         Diagnoses as of 04/29/24 0016   Seizure (Multi)     Labs Reviewed   CBC WITH AUTO DIFFERENTIAL - Abnormal       Result Value    WBC 6.3      nRBC 0.0      RBC 4.14      Hemoglobin 13.5      Hematocrit 36.8      MCV 89      MCH 32.6      MCHC 36.7 (*)     RDW 13.1      Platelets 132 (*)     Neutrophils % 78.5      Immature Granulocytes %, Automated 0.2      Lymphocytes % 13.7      Monocytes % 6.5      Eosinophils % 0.5      Basophils % 0.6      Neutrophils Absolute 4.95      Immature Granulocytes Absolute, Automated 0.01      Lymphocytes Absolute 0.86 (*)     Monocytes Absolute 0.41      Eosinophils Absolute 0.03      Basophils Absolute 0.04     COMPREHENSIVE METABOLIC PANEL - Abnormal    Glucose 232 (*)     Sodium 136      Potassium 4.5      Chloride 98      Bicarbonate 17 (*)     Anion Gap 26 (*)     Urea Nitrogen 14      Creatinine 1.00      eGFR 69      Calcium 10.3      Albumin 4.6      Alkaline Phosphatase 131 (*)     Total Protein 9.3 (*)     AST 46 (*)     Bilirubin, Total 0.7      ALT 32     BLOOD GAS VENOUS FULL PANEL - Abnormal    POCT pH, Venous 7.33      POCT pCO2, Venous 37 (*)     POCT pO2, Venous 36      POCT SO2, Venous 49      POCT Oxy Hemoglobin, Venous 48.0      POCT Hematocrit Calculated, Venous 42.0      POCT Sodium, Venous 135 (*)     POCT Potassium, Venous 4.7      POCT Chloride, Venous 99      POCT Ionized Calicum, Venous 1.28      POCT Glucose, Venous 254 (*)     POCT Lactate, Venous 4.9 (*)     POCT Base Excess, Venous -5.8 (*)     POCT HCO3 Calculated, Venous 19.5 (*)     POCT Hemoglobin, Venous 13.9      POCT Anion Gap, Venous 21.0      Patient  Temperature 37.0      FiO2 21     LEVETIRACETAM - Abnormal    Keppra <2 (*)     Narrative:     Brivaracetam may falsely increase the amount of Levetiracetam measured by this method. Serum levels should be confirmed by a valid chromatographic method  for patients with these drugs co-present in circulation.   BLOOD GAS LACTIC ACID, VENOUS - Abnormal    POCT Lactate, Venous 6.8 (*)    COMPREHENSIVE METABOLIC PANEL - Abnormal    Glucose 76      Sodium 137      Potassium 5.0      Chloride 100      Bicarbonate 20 (*)     Anion Gap 22 (*)     Urea Nitrogen 11      Creatinine 0.76      eGFR >90      Calcium 9.6      Albumin 4.0      Alkaline Phosphatase 110      Total Protein 8.2      AST 40 (*)     Bilirubin, Total 0.6      ALT 29     BLOOD GAS LACTIC ACID, VENOUS - Abnormal    POCT Lactate, Venous 2.3 (*)    PHOSPHORUS - Normal    Phosphorus 3.1     MAGNESIUM - Normal    Magnesium 2.07     LIPASE - Normal    Lipase 21      Narrative:     Venipuncture immediately after or during the administration of Metamizole may lead to falsely low results. Testing should be performed immediately prior to Metamizole dosing.   INFLUENZA A AND B PCR - Normal    Flu A Result Not Detected      Flu B Result Not Detected      Narrative:     This assay is an in vitro diagnostic multiplex nucleic acid amplification test for the detection and discrimination of Influenza A & B from nasopharyngeal specimens, and has been validated for use at [facility]. Negative results do not preclude Influenza A/B infections, and should not be used as the sole basis for diagnosis, treatment, or other management decisions. If Influenza A/B and RSV PCR results are negative, testing for Parainfluenza virus, Adenovirus and Metapneumovirus is routinely performed for [facility] pediatric oncology and intensive care inpatients, and is available on other patients by placing an add-on request.   SARS-COV-2 PCR - Normal    Coronavirus 2019, PCR Not  Detected      Narrative:     This assay has received FDA Emergency Use Authorization (EUA) and is only authorized for the duration of time that circumstances exist to justify the authorization of the emergency use of in vitro diagnostic tests for the detection of SARS-CoV-2 virus and/or diagnosis of COVID-19 infection under section 564(b)(1) of the Act, 21 U.S.C. 360bbb-3(b)(1). This assay is an in vitro diagnostic nucleic acid amplification test for the qualitative detection of SARS-CoV-2 from nasopharyngeal specimens and has been validated for use at [facility]. Negative results do not preclude COVID-19 infections and should not be used as the sole basis for diagnosis, treatment, or other management decisions.     ACUTE TOXICOLOGY PANEL, BLOOD - Normal    Acetaminophen <10.0      Salicylate  <3      Alcohol <10     HEPATITIS PANEL, ACUTE - Normal    Hepatitis B Surface AG Nonreactive      Hepatitis A  AB- IgM Nonreactive      Hepatitis B Core AB; IgM Nonreactive      Hepatitis C AB Nonreactive     SYPHILIS SCREENING WITH REFLEX - Normal    Syphilis Total Ab Nonreactive     BLOOD GAS LACTIC ACID, VENOUS - Normal    POCT Lactate, Venous 0.9     POCT PREGNANCY, URINE - Normal    Preg Test, Ur Negative     URINALYSIS WITH REFLEX CULTURE AND MICROSCOPIC    Narrative:     The following orders were created for panel order Urinalysis with Reflex Culture and Microscopic.  Procedure                               Abnormality         Status                     ---------                               -----------         ------                     Urinalysis with Reflex C...[[id number]]                                                 Extra Urine Gray Tube[[id number]]                            Final result                 Please view results for these tests on the individual orders.   EXTRA URINE GRAY TUBE    Extra Tube Hold for add-ons.     GRAY TOP    Extra Tube Hold for add-ons.         CT abdomen  pelvis w IV contrast   Final Result   1. No acute abnormality within the abdomen or pelvis.        2. Similar appearance of the stomach displaying evidence of diffuse   chronic gastritis/peptic ulcer disease, specifically relating to   diffuse thickening of the gastric rugal folds and submucosal   thickening of the antral pyloric region. No evidence of active   perigastric inflammation or perforated ulcer.        3. Similar appearance of the pancreas with diffuse calcifications and   prominent hypodense appearance of the head relating to sequela of   chronic pancreatitis.        4. Redemonstration of post embolization of splenic artery and   chronically thrombosed splenic vein with associated small gastric   varices. The        5. Hepatomegaly and steatosis.        6. Colonic diverticulosis.        MACRO:   None.        Signed by: Cory Garduno 4/25/2024 11:43 PM   Dictation workstation:   [id number]      CT head wo IV contrast   Final Result   CT HEAD:   1. No acute intracranial abnormality or calvarial fracture.   2. Stable moderate burden of macrovascular in microvascular chronic   ischemic changes as described above.        CT MAXILLOFACIAL SKELETON:   1. No acute facial bone fracture.   2. Mild left preseptal and periorbital soft tissue swelling/hematoma   without acute intraorbital abnormality.   3. Chronic, minimally depressed left nasal bone fracture unchanged   from 12/27/2023        CT CERVICAL SPINE:   1. No acute fracture or traumatic malalignment of the cervical spine.   2. Spondylotic changes of the cervical spine as detailed above,   notable for significant progression of degenerative disc disease at   C6-C7.   3. Unchanged 0.6 x 0.5 cm solid pulmonary nodule in the left upper   lobe dating back to at least 10/20/2018, likely benign.        MACRO:   None.        Signed by: Cory Garduno 4/25/2024 11:14 PM   Dictation workstation:   [id number]      CT maxillofacial bones wo IV contrast    Final Result   CT HEAD:   1. No acute intracranial abnormality or calvarial fracture.   2. Stable moderate burden of macrovascular in microvascular chronic   ischemic changes as described above.        CT MAXILLOFACIAL SKELETON:   1. No acute facial bone fracture.   2. Mild left preseptal and periorbital soft tissue swelling/hematoma   without acute intraorbital abnormality.   3. Chronic, minimally depressed left nasal bone fracture unchanged   from 12/27/2023        CT CERVICAL SPINE:   1. No acute fracture or traumatic malalignment of the cervical spine.   2. Spondylotic changes of the cervical spine as detailed above,   notable for significant progression of degenerative disc disease at   C6-C7.   3. Unchanged 0.6 x 0.5 cm solid pulmonary nodule in the left upper   lobe dating back to at least 10/20/2018, likely benign.        MACRO:   None.        Signed by: Cory Garduno 4/25/2024 11:14 PM   Dictation workstation:   [id number]      CT cervical spine wo IV contrast   Final Result   CT HEAD:   1. No acute intracranial abnormality or calvarial fracture.   2. Stable moderate burden of macrovascular in microvascular chronic   ischemic changes as described above.        CT MAXILLOFACIAL SKELETON:   1. No acute facial bone fracture.   2. Mild left preseptal and periorbital soft tissue swelling/hematoma   without acute intraorbital abnormality.   3. Chronic, minimally depressed left nasal bone fracture unchanged   from 12/27/2023        CT CERVICAL SPINE:   1. No acute fracture or traumatic malalignment of the cervical spine.   2. Spondylotic changes of the cervical spine as detailed above,   notable for significant progression of degenerative disc disease at   C6-C7.   3. Unchanged 0.6 x 0.5 cm solid pulmonary nodule in the left upper   lobe dating back to at least 10/20/2018, likely benign.        MACRO:   None.        Signed by: Cory Garduno 4/25/2024 11:14 PM   Dictation workstation:   [id number]           Medical Decision Making  Patient is a 48-year-old female who presented for evaluation after witnessed seizure.  She is unable to provide us any of the contact information for anyone who witnessed the seizures.  She was loaded with Keppra here.  She is hemodynamically stable.  She has chronic pancreatitis and imaging was obtained with CT showing no acute findings within the stomach duodenum and pancreas.  She was found to have some swelling over the left eye and given that we do not have a good history of what happened and CT head C-spine and face were obtained with no acute fracture or intra cranial bleeding on my independent interpretation.  Patient remains hemodynamically stable.  She does have sniffing abdominal pain that was controlled with IV medications.  Patient is signed out to oncoming ED care provider pending final disposition and final radiology reads of her CT scans.    Patient seen and discussed with Dr. Ramon Wynne DO  PGY-2 Emergency Medicine          Procedure  Procedures       Debora Wynne DO  Resident  04/29/24 0021

## 2024-04-26 VITALS
BODY MASS INDEX: 19.49 KG/M2 | HEART RATE: 84 BPM | RESPIRATION RATE: 16 BRPM | OXYGEN SATURATION: 96 % | DIASTOLIC BLOOD PRESSURE: 73 MMHG | WEIGHT: 110 LBS | TEMPERATURE: 98.4 F | SYSTOLIC BLOOD PRESSURE: 129 MMHG

## 2024-04-26 LAB
HOLD SPECIMEN: NORMAL
LACTATE BLDV-SCNC: 0.9 MMOL/L (ref 0.4–2)

## 2024-04-26 PROCEDURE — 83605 ASSAY OF LACTIC ACID: CPT

## 2024-04-26 PROCEDURE — 36415 COLL VENOUS BLD VENIPUNCTURE: CPT

## 2024-04-26 RX ORDER — LEVETIRACETAM 500 MG/1
1000 TABLET ORAL 2 TIMES DAILY
Qty: 120 TABLET | Refills: 0 | Status: SHIPPED | OUTPATIENT
Start: 2024-04-26 | End: 2024-05-13

## 2024-04-26 ASSESSMENT — PAIN SCALES - GENERAL
PAINLEVEL_OUTOF10: 0 - NO PAIN
PAINLEVEL_OUTOF10: 0 - NO PAIN

## 2024-04-26 NOTE — PROGRESS NOTES
Patient signed out to me by the previous provider.  See initial H&P for full details.  This is a 48-year-old female who presented to the ED after family witnessed breakthrough seizures.  Keppra level is negative therefore this is likely a breakthrough seizure due to noncompliance.  She was endorsing abdominal pain which she typically does have after seizures however due to her elevated lactate not clearing appropriately, imaging was obtained.  Imaging shows chronic findings without acute presentation contributing to her pain.  Repeat lactic did clear after IV fluids.  Patient sleeping on my exam and after she was woken and examined did not have focal findings.  She will be discharged home with a prescription for Keppra and will follow-up with PCP and neurology.

## 2024-04-26 NOTE — DISCHARGE INSTRUCTIONS
Take your Keppra as prescribed for your seizures.  Follow-up with your primary care physician and neurology.  Return to the ER for breakthrough seizures.

## 2024-04-28 LAB
ATRIAL RATE: 98 BPM
P AXIS: 79 DEGREES
P OFFSET: 196 MS
P ONSET: 146 MS
PR INTERVAL: 152 MS
Q ONSET: 222 MS
QRS COUNT: 17 BEATS
QRS DURATION: 80 MS
QT INTERVAL: 372 MS
QTC CALCULATION(BAZETT): 474 MS
QTC FREDERICIA: 438 MS
R AXIS: 45 DEGREES
T AXIS: 85 DEGREES
T OFFSET: 408 MS
VENTRICULAR RATE: 98 BPM

## 2024-05-01 ENCOUNTER — PATIENT OUTREACH (OUTPATIENT)
Dept: CARE COORDINATION | Facility: CLINIC | Age: 49
End: 2024-05-01
Payer: COMMERCIAL

## 2024-05-09 ENCOUNTER — APPOINTMENT (OUTPATIENT)
Dept: CARDIOLOGY | Facility: HOSPITAL | Age: 49
End: 2024-05-09
Payer: COMMERCIAL

## 2024-05-09 RX ORDER — POLYETHYLENE GLYCOL 3350, SODIUM SULFATE ANHYDROUS, SODIUM BICARBONATE, SODIUM CHLORIDE, POTASSIUM CHLORIDE 236; 22.74; 6.74; 5.86; 2.97 G/4L; G/4L; G/4L; G/4L; G/4L
4000 POWDER, FOR SOLUTION ORAL ONCE
COMMUNITY
Start: 2024-05-08

## 2024-05-09 RX ORDER — CONJUGATED ESTROGENS 0.62 MG/G
CREAM VAGINAL
COMMUNITY
Start: 2024-05-08

## 2024-05-09 RX ORDER — IBUPROFEN 200 MG
1 TABLET ORAL EVERY 24 HOURS
COMMUNITY
Start: 2024-05-08

## 2024-05-13 ENCOUNTER — PATIENT OUTREACH (OUTPATIENT)
Dept: CARE COORDINATION | Facility: CLINIC | Age: 49
End: 2024-05-13
Payer: COMMERCIAL

## 2024-05-13 NOTE — PROGRESS NOTES
Outreach call to patient following up on appointment with primary care provider. Pt is set to establish care at [facility] in June.  Will continue to follow.

## 2024-07-03 ENCOUNTER — PATIENT OUTREACH (OUTPATIENT)
Dept: CARE COORDINATION | Facility: CLINIC | Age: 49
End: 2024-07-03
Payer: COMMERCIAL

## 2024-07-08 ENCOUNTER — TELEPHONE (OUTPATIENT)
Dept: PRIMARY CARE | Facility: CLINIC | Age: 49
End: 2024-07-08
Payer: COMMERCIAL

## 2024-07-08 NOTE — TELEPHONE ENCOUNTER
Patient was seeing Dr Tellez as her provider and she was last seen On January 9 this year. She is requesting for her Metoprolol. I also tried calling the patient to schedule for  FUV but no response

## 2024-07-09 ENCOUNTER — APPOINTMENT (OUTPATIENT)
Dept: CARDIOLOGY | Facility: HOSPITAL | Age: 49
End: 2024-07-09
Payer: COMMERCIAL

## 2024-07-10 ENCOUNTER — APPOINTMENT (OUTPATIENT)
Dept: OPHTHALMOLOGY | Facility: CLINIC | Age: 49
End: 2024-07-10
Payer: COMMERCIAL

## 2024-07-23 DIAGNOSIS — I10 BENIGN HYPERTENSION: ICD-10-CM

## 2024-07-23 RX ORDER — METOPROLOL SUCCINATE 50 MG/1
50 TABLET, EXTENDED RELEASE ORAL DAILY
Qty: 90 TABLET | Refills: 3 | Status: SHIPPED | OUTPATIENT
Start: 2024-07-23 | End: 2025-07-23

## 2024-08-15 ENCOUNTER — APPOINTMENT (OUTPATIENT)
Dept: PRIMARY CARE | Facility: CLINIC | Age: 49
End: 2024-08-15
Payer: COMMERCIAL

## 2024-08-27 PROBLEM — R92.8 ABNORMAL MAMMOGRAM: Status: ACTIVE | Noted: 2024-07-01

## 2024-09-05 ENCOUNTER — APPOINTMENT (OUTPATIENT)
Dept: DERMATOLOGY | Facility: CLINIC | Age: 49
End: 2024-09-05
Payer: COMMERCIAL

## 2024-09-05 DIAGNOSIS — K59.00 CONSTIPATION, UNSPECIFIED CONSTIPATION TYPE: ICD-10-CM

## 2024-09-05 DIAGNOSIS — F31.70 BIPOLAR DISORDER IN PARTIAL REMISSION, MOST RECENT EPISODE UNSPECIFIED TYPE (MULTI): ICD-10-CM

## 2024-09-12 RX ORDER — TALC
3 POWDER (GRAM) TOPICAL NIGHTLY
Qty: 30 TABLET | Refills: 10 | Status: SHIPPED | OUTPATIENT
Start: 2024-09-12

## 2024-09-12 RX ORDER — DOCUSATE SODIUM 100 MG/1
CAPSULE, LIQUID FILLED ORAL
Qty: 60 CAPSULE | Refills: 10 | Status: SHIPPED | OUTPATIENT
Start: 2024-09-12

## 2024-09-16 ENCOUNTER — APPOINTMENT (OUTPATIENT)
Dept: PRIMARY CARE | Facility: CLINIC | Age: 49
End: 2024-09-16
Payer: COMMERCIAL

## 2024-09-19 ENCOUNTER — TELEPHONE (OUTPATIENT)
Dept: PRIMARY CARE | Facility: CLINIC | Age: 49
End: 2024-09-19
Payer: COMMERCIAL

## 2024-09-20 DIAGNOSIS — Z86.73 HISTORY OF CVA (CEREBROVASCULAR ACCIDENT): ICD-10-CM

## 2024-09-26 RX ORDER — ROSUVASTATIN CALCIUM 40 MG/1
40 TABLET, COATED ORAL DAILY
Qty: 30 TABLET | Refills: 10 | Status: SHIPPED | OUTPATIENT
Start: 2024-09-26

## 2024-10-09 ENCOUNTER — APPOINTMENT (OUTPATIENT)
Dept: OPHTHALMOLOGY | Facility: CLINIC | Age: 49
End: 2024-10-09
Payer: COMMERCIAL

## 2024-11-04 ENCOUNTER — APPOINTMENT (OUTPATIENT)
Dept: PRIMARY CARE | Facility: CLINIC | Age: 49
End: 2024-11-04
Payer: COMMERCIAL

## 2024-11-04 VITALS
TEMPERATURE: 97 F | BODY MASS INDEX: 23.37 KG/M2 | OXYGEN SATURATION: 96 % | WEIGHT: 127 LBS | DIASTOLIC BLOOD PRESSURE: 75 MMHG | HEART RATE: 86 BPM | HEIGHT: 62 IN | SYSTOLIC BLOOD PRESSURE: 109 MMHG

## 2024-11-04 DIAGNOSIS — Z78.9 ALCOHOL USE: ICD-10-CM

## 2024-11-04 DIAGNOSIS — I63.9 ISCHEMIC STROKE OF FRONTAL LOBE (MULTI): ICD-10-CM

## 2024-11-04 DIAGNOSIS — I10 BENIGN HYPERTENSION: ICD-10-CM

## 2024-11-04 DIAGNOSIS — Z11.3 SCREEN FOR STD (SEXUALLY TRANSMITTED DISEASE): Primary | ICD-10-CM

## 2024-11-04 DIAGNOSIS — K21.9 GASTROESOPHAGEAL REFLUX DISEASE WITHOUT ESOPHAGITIS: ICD-10-CM

## 2024-11-04 DIAGNOSIS — K86.0 ALCOHOL-INDUCED CHRONIC PANCREATITIS (MULTI): ICD-10-CM

## 2024-11-04 DIAGNOSIS — F31.70 BIPOLAR DISORDER IN PARTIAL REMISSION, MOST RECENT EPISODE UNSPECIFIED TYPE (MULTI): ICD-10-CM

## 2024-11-04 DIAGNOSIS — Q18.1 CYST ON EAR: ICD-10-CM

## 2024-11-04 PROCEDURE — 3078F DIAST BP <80 MM HG: CPT

## 2024-11-04 PROCEDURE — 3074F SYST BP LT 130 MM HG: CPT

## 2024-11-04 PROCEDURE — 99401 PREV MED CNSL INDIV APPRX 15: CPT

## 2024-11-04 PROCEDURE — 3008F BODY MASS INDEX DOCD: CPT

## 2024-11-04 PROCEDURE — 99214 OFFICE O/P EST MOD 30 MIN: CPT

## 2024-11-04 RX ORDER — DICLOFENAC SODIUM 10 MG/G
GEL TOPICAL
COMMUNITY
Start: 2024-10-04

## 2024-11-04 RX ORDER — PANCRELIPASE 24000; 76000; 120000 [USP'U]/1; [USP'U]/1; [USP'U]/1
1 CAPSULE, DELAYED RELEASE PELLETS ORAL
Qty: 90 CAPSULE | Refills: 11 | Status: SHIPPED | OUTPATIENT
Start: 2024-11-04 | End: 2025-11-04

## 2024-11-04 RX ORDER — AMLODIPINE BESYLATE 5 MG/1
5 TABLET ORAL DAILY
Qty: 90 TABLET | Refills: 3 | Status: SHIPPED | OUTPATIENT
Start: 2024-11-04 | End: 2025-11-04

## 2024-11-04 RX ORDER — NAPROXEN SODIUM 220 MG/1
81 TABLET, FILM COATED ORAL DAILY
COMMUNITY
Start: 2024-10-04 | End: 2024-11-04 | Stop reason: SDUPTHER

## 2024-11-04 RX ORDER — PYRIDOXINE HCL (VITAMIN B6) 100 MG
100 TABLET ORAL
Qty: 30 TABLET | Refills: 11 | Status: SHIPPED | OUTPATIENT
Start: 2024-11-04 | End: 2025-11-04

## 2024-11-04 RX ORDER — NAPROXEN SODIUM 220 MG/1
81 TABLET, FILM COATED ORAL DAILY
Qty: 30 TABLET | Refills: 11 | Status: SHIPPED | OUTPATIENT
Start: 2024-11-04 | End: 2025-10-30

## 2024-11-04 RX ORDER — NALTREXONE HYDROCHLORIDE 50 MG/1
50 TABLET, FILM COATED ORAL DAILY
Qty: 30 TABLET | Refills: 11 | Status: SHIPPED | OUTPATIENT
Start: 2024-11-04 | End: 2025-11-04

## 2024-11-04 RX ORDER — LANOLIN ALCOHOL/MO/W.PET/CERES
1000 CREAM (GRAM) TOPICAL DAILY
Qty: 30 TABLET | Refills: 10 | Status: SHIPPED | OUTPATIENT
Start: 2024-11-04

## 2024-11-04 RX ORDER — PANTOPRAZOLE SODIUM 40 MG/1
40 TABLET, DELAYED RELEASE ORAL
Qty: 30 TABLET | Refills: 0 | Status: SHIPPED | OUTPATIENT
Start: 2024-11-04 | End: 2024-12-04

## 2024-11-04 RX ORDER — GABAPENTIN 300 MG/1
300 CAPSULE ORAL
Qty: 90 CAPSULE | Refills: 3 | Status: SHIPPED | OUTPATIENT
Start: 2024-11-04 | End: 2025-11-04

## 2024-11-04 ASSESSMENT — PAIN SCALES - GENERAL: PAINLEVEL_OUTOF10: 10-WORST PAIN EVER

## 2024-11-04 NOTE — PROGRESS NOTES
"Subjective   Patient ID: Shi Trevino is a 49 y.o. female who presents for Follow-up (Pt has concerns about a lump on the back on her right ear. Pt also needs Std testing. ) and Med Refill.    Shi Pace is a 50 yo F with h/o HTN, thrombocytopenia, EtOH pancreatitis, portal vein thrombosis, bipolar disorder/PTSD, ischemic CVA of temporal lobe, seizure disorder, asthma, active smoking presents for follow up.     Right ear mass  - been there since birth  - was evaluated in 2013, was told it was benign  - has been painful for 2-3 years  - has noticed it has been growing  - no drainage or erythema or warmth  - intermittent changes in hearing over past few months  - Last seen by otolaryngology 7/2024 and stated Audiogram reviewed. Essentially normal hearing bilaterally, Mild SNHL present L ear at 8000 Hz. % R, 90% L. Normal tymps  -Advised repeat audio in 1 year, unless change to hearing noted prior    AUD  - patient drinking on weekend  - doesn't count her drinks but states she drinks at least 3 vodka beverages  - drinks usually Friday and saturday       STI testing  - Last sexual encounter was one week ago with a male partner  - no barrier protection or condom use  - no symptoms at this time  - patient wants to be extra cautious and have testing done    Med Refill       Review of Systems    Objective   /75 (BP Location: Right arm, Patient Position: Sitting)   Pulse 86   Temp 36.1 °C (97 °F) (Temporal)   Ht 1.575 m (5' 2\")   Wt 57.6 kg (127 lb)   SpO2 96%   BMI 23.23 kg/m²     Physical Exam    Assessment/Plan   Problem List Items Addressed This Visit       Alcohol-induced chronic pancreatitis (Multi)    Relevant Medications    naltrexone (Depade) 50 mg tablet    lipase-protease-amylase (Creon) 24,000-76,000 -120,000 unit capsule    pyridoxine (Vitamin B-6) 100 mg tablet    Benign hypertension    Relevant Medications    amLODIPine (Norvasc) 5 mg tablet    Bipolar disorder in partial remission " (Multi)    Relevant Medications    gabapentin (Neurontin) 300 mg capsule    Cyst on ear    Relevant Orders    Referral to Dermatology    Ischemic stroke of frontal lobe (Multi)    Relevant Medications    aspirin 81 mg chewable tablet     Other Visit Diagnoses       Screen for STD (sexually transmitted disease)    -  Primary    Relevant Orders    C. trachomatis / N. gonorrhoeae, Amplified    HIV 1/2 Antigen/Antibody Screen with Reflex to Confirmation    Hepatitis B Surface Antigen    Hepatitis C Antibody    Syphilis Screen with Reflex    Trichomonas vaginalis, Amplified    Alcohol use        Relevant Medications    cyanocobalamin (Vitamin B-12) 1,000 mcg tablet    Gastroesophageal reflux disease without esophagitis        Relevant Medications    pantoprazole (ProtoNix) 40 mg EC tablet          Shi Pace is a 50 yo F with h/o HTN, thrombocytopenia, EtOH pancreatitis, portal vein thrombosis, bipolar disorder/PTSD, ischemic CVA of temporal lobe, seizure disorder, asthma, active smoking presents for follow up.     #Right ear mass  - been there since birth  - was evaluated in 2013, was told it was benign  - has been painful for 2-3 years  - has noticed it has been growing  - no drainage or erythema or warmth  - intermittent changes in hearing over past few months  - Last seen by otolaryngology 7/2024 and stated Audiogram reviewed. Essentially normal hearing bilaterally, Mild SNHL present L ear at 8000 Hz. % R, 90% L. Normal tymps, Advised repeat audio in 1 year, unless change to hearing noted prior  - Exam notable for 2.5x2 cm mass behind ear  - will refer to dermatology for further evaluation    AUD  - patient drinking on weekend  - doesn't count her drinks but states she drinks at least 3 vodka beverages  - drinks usually Friday and saturday   - counseled patient on binge drinking and reviewed previous Lfts  - Patient plans to reduce alcohol intake  - Naltrexone ordered  - will follow up at next  visit      STI testing  - Last sexual encounter was one week ago with a male partner  - no barrier protection or condom use  - no symptoms at this time  - patient wants to be extra cautious and have testing done  - Will order STD testing as above    Patient discussed with attending physician Dr. Cavazos  Plan preliminary until cosigned by attending physician.    Dima Zhao MD  Family Medicine  PGY-2

## 2024-11-08 ENCOUNTER — DOCUMENTATION (OUTPATIENT)
Dept: PRIMARY CARE | Facility: CLINIC | Age: 49
End: 2024-11-08
Payer: COMMERCIAL

## 2024-11-08 NOTE — PROGRESS NOTES
Received inbasket message that patient's pharmacy needs my YUE number.    [facility] called: left YUE number on voicemail

## 2024-12-04 DIAGNOSIS — F17.210 CIGARETTE NICOTINE DEPENDENCE WITHOUT COMPLICATION: ICD-10-CM

## 2024-12-04 DIAGNOSIS — K86.0 ALCOHOL-INDUCED CHRONIC PANCREATITIS (MULTI): ICD-10-CM

## 2024-12-05 RX ORDER — ONDANSETRON 4 MG/1
TABLET, ORALLY DISINTEGRATING ORAL
Qty: 10 TABLET | Refills: 10 | Status: SHIPPED | OUTPATIENT
Start: 2024-12-05

## 2024-12-05 RX ORDER — SYRINGE-NEEDLE,INSULIN,0.5 ML 28GX1/2"
SYRINGE, EMPTY DISPOSABLE MISCELLANEOUS
Qty: 30 TABLET | Refills: 10 | Status: SHIPPED | OUTPATIENT
Start: 2024-12-05

## 2024-12-05 RX ORDER — FAMOTIDINE 20 MG/1
TABLET, FILM COATED ORAL
Qty: 60 TABLET | Refills: 10 | Status: SHIPPED | OUTPATIENT
Start: 2024-12-05

## 2025-01-22 ENCOUNTER — APPOINTMENT (OUTPATIENT)
Dept: OPHTHALMOLOGY | Facility: CLINIC | Age: 50
End: 2025-01-22
Payer: COMMERCIAL

## 2025-01-28 ENCOUNTER — APPOINTMENT (OUTPATIENT)
Dept: OPHTHALMOLOGY | Facility: CLINIC | Age: 50
End: 2025-01-28
Payer: COMMERCIAL

## 2025-02-24 ENCOUNTER — APPOINTMENT (OUTPATIENT)
Dept: DERMATOLOGY | Facility: HOSPITAL | Age: 50
End: 2025-02-24
Payer: COMMERCIAL

## 2025-02-24 DIAGNOSIS — K58.8 OTHER IRRITABLE BOWEL SYNDROME: Primary | ICD-10-CM

## 2025-02-24 RX ORDER — DICYCLOMINE HYDROCHLORIDE 10 MG/1
10 CAPSULE ORAL 4 TIMES DAILY PRN
Qty: 30 CAPSULE | Refills: 0 | Status: SHIPPED | OUTPATIENT
Start: 2025-02-24 | End: 2025-03-06

## 2025-02-24 NOTE — TELEPHONE ENCOUNTER
Jessi of [facility] called requesting refill of pt dicyclomine. Noted order . Message to provider r/t refill need.

## 2025-03-21 ENCOUNTER — APPOINTMENT (OUTPATIENT)
Dept: RADIOLOGY | Facility: HOSPITAL | Age: 50
End: 2025-03-21
Payer: COMMERCIAL

## 2025-03-21 ENCOUNTER — CLINICAL SUPPORT (OUTPATIENT)
Dept: EMERGENCY MEDICINE | Facility: HOSPITAL | Age: 50
End: 2025-03-21
Payer: COMMERCIAL

## 2025-03-21 ENCOUNTER — OFFICE VISIT (OUTPATIENT)
Facility: HOSPITAL | Age: 50
End: 2025-03-21
Payer: COMMERCIAL

## 2025-03-21 ENCOUNTER — HOSPITAL ENCOUNTER (INPATIENT)
Facility: HOSPITAL | Age: 50
LOS: 1 days | Discharge: HOME | End: 2025-03-23
Attending: EMERGENCY MEDICINE | Admitting: STUDENT IN AN ORGANIZED HEALTH CARE EDUCATION/TRAINING PROGRAM
Payer: COMMERCIAL

## 2025-03-21 VITALS
HEART RATE: 106 BPM | SYSTOLIC BLOOD PRESSURE: 97 MMHG | HEIGHT: 62 IN | OXYGEN SATURATION: 100 % | DIASTOLIC BLOOD PRESSURE: 64 MMHG | TEMPERATURE: 96.7 F | WEIGHT: 105.7 LBS | BODY MASS INDEX: 19.45 KG/M2

## 2025-03-21 DIAGNOSIS — K86.0 ALCOHOL-INDUCED CHRONIC PANCREATITIS (MULTI): Primary | ICD-10-CM

## 2025-03-21 DIAGNOSIS — K86.1 CHRONIC PANCREATITIS, UNSPECIFIED PANCREATITIS TYPE (MULTI): Primary | ICD-10-CM

## 2025-03-21 DIAGNOSIS — K86.0 ALCOHOL-INDUCED CHRONIC PANCREATITIS (MULTI): ICD-10-CM

## 2025-03-21 DIAGNOSIS — E87.29 ALCOHOLIC KETOACIDOSIS: ICD-10-CM

## 2025-03-21 DIAGNOSIS — N75.1 BARTHOLIN'S GLAND ABSCESS: ICD-10-CM

## 2025-03-21 LAB
ALBUMIN SERPL BCP-MCNC: 3.9 G/DL (ref 3.4–5)
ALP SERPL-CCNC: 136 U/L (ref 33–110)
ALT SERPL W P-5'-P-CCNC: 51 U/L (ref 7–45)
ANION GAP BLDV CALCULATED.4IONS-SCNC: 19 MMOL/L (ref 10–25)
ANION GAP BLDV CALCULATED.4IONS-SCNC: 24 MMOL/L (ref 10–25)
ANION GAP SERPL CALC-SCNC: 29 MMOL/L (ref 10–20)
APPEARANCE UR: CLEAR
AST SERPL W P-5'-P-CCNC: 119 U/L (ref 9–39)
ATRIAL RATE: 87 BPM
B-OH-BUTYR SERPL-SCNC: 9.97 MMOL/L (ref 0.02–0.27)
BASE EXCESS BLDV CALC-SCNC: -7.4 MMOL/L (ref -2–3)
BASE EXCESS BLDV CALC-SCNC: -7.6 MMOL/L (ref -2–3)
BASOPHILS # BLD AUTO: 0.03 X10*3/UL (ref 0–0.1)
BASOPHILS NFR BLD AUTO: 0.6 %
BILIRUB SERPL-MCNC: 0.6 MG/DL (ref 0–1.2)
BILIRUB UR STRIP.AUTO-MCNC: NEGATIVE MG/DL
BODY TEMPERATURE: 37 DEGREES CELSIUS
BODY TEMPERATURE: 37 DEGREES CELSIUS
BUN SERPL-MCNC: 11 MG/DL (ref 6–23)
CA-I BLDV-SCNC: 1.22 MMOL/L (ref 1.1–1.33)
CA-I BLDV-SCNC: 1.25 MMOL/L (ref 1.1–1.33)
CALCIUM SERPL-MCNC: 8.7 MG/DL (ref 8.6–10.6)
CARDIAC TROPONIN I PNL SERPL HS: 8 NG/L (ref 0–34)
CHLORIDE BLDV-SCNC: 96 MMOL/L (ref 98–107)
CHLORIDE BLDV-SCNC: 97 MMOL/L (ref 98–107)
CHLORIDE SERPL-SCNC: 93 MMOL/L (ref 98–107)
CO2 SERPL-SCNC: 15 MMOL/L (ref 21–32)
COLOR UR: ABNORMAL
CREAT SERPL-MCNC: 1.02 MG/DL (ref 0.5–1.05)
EGFRCR SERPLBLD CKD-EPI 2021: 67 ML/MIN/1.73M*2
EOSINOPHIL # BLD AUTO: 0.03 X10*3/UL (ref 0–0.7)
EOSINOPHIL NFR BLD AUTO: 0.6 %
ERYTHROCYTE [DISTWIDTH] IN BLOOD BY AUTOMATED COUNT: 12.5 % (ref 11.5–14.5)
GLUCOSE BLDV-MCNC: 145 MG/DL (ref 74–99)
GLUCOSE BLDV-MCNC: 99 MG/DL (ref 74–99)
GLUCOSE SERPL-MCNC: 101 MG/DL (ref 74–99)
GLUCOSE UR STRIP.AUTO-MCNC: NORMAL MG/DL
HCO3 BLDV-SCNC: 16.6 MMOL/L (ref 22–26)
HCO3 BLDV-SCNC: 17.4 MMOL/L (ref 22–26)
HCT VFR BLD AUTO: 29.6 % (ref 36–46)
HCT VFR BLD EST: 29 % (ref 36–46)
HCT VFR BLD EST: 33 % (ref 36–46)
HGB BLD-MCNC: 11 G/DL (ref 12–16)
HGB BLDV-MCNC: 11.1 G/DL (ref 12–16)
HGB BLDV-MCNC: 9.8 G/DL (ref 12–16)
IMM GRANULOCYTES # BLD AUTO: 0.02 X10*3/UL (ref 0–0.7)
IMM GRANULOCYTES NFR BLD AUTO: 0.4 % (ref 0–0.9)
INHALED O2 CONCENTRATION: 21 %
INHALED O2 CONCENTRATION: 21 %
KETONES UR STRIP.AUTO-MCNC: ABNORMAL MG/DL
LACTATE BLDV-SCNC: 0.7 MMOL/L (ref 0.4–2)
LACTATE BLDV-SCNC: 1.3 MMOL/L (ref 0.4–2)
LEUKOCYTE ESTERASE UR QL STRIP.AUTO: NEGATIVE
LIPASE SERPL-CCNC: 17 U/L (ref 9–82)
LYMPHOCYTES # BLD AUTO: 1.25 X10*3/UL (ref 1.2–4.8)
LYMPHOCYTES NFR BLD AUTO: 24.4 %
MAGNESIUM SERPL-MCNC: 2.15 MG/DL (ref 1.6–2.4)
MCH RBC QN AUTO: 34 PG (ref 26–34)
MCHC RBC AUTO-ENTMCNC: 37.2 G/DL (ref 32–36)
MCV RBC AUTO: 91 FL (ref 80–100)
MONOCYTES # BLD AUTO: 0.51 X10*3/UL (ref 0.1–1)
MONOCYTES NFR BLD AUTO: 9.9 %
NEUTROPHILS # BLD AUTO: 3.29 X10*3/UL (ref 1.2–7.7)
NEUTROPHILS NFR BLD AUTO: 64.1 %
NITRITE UR QL STRIP.AUTO: NEGATIVE
NRBC BLD-RTO: 0 /100 WBCS (ref 0–0)
OXYHGB MFR BLDV: 45.6 % (ref 45–75)
OXYHGB MFR BLDV: 86.8 % (ref 45–75)
P AXIS: 52 DEGREES
P OFFSET: 175 MS
P ONSET: 123 MS
PCO2 BLDV: 28 MM HG (ref 41–51)
PCO2 BLDV: 33 MM HG (ref 41–51)
PH BLDV: 7.33 PH (ref 7.33–7.43)
PH BLDV: 7.38 PH (ref 7.33–7.43)
PH UR STRIP.AUTO: 7 [PH]
PLATELET # BLD AUTO: 133 X10*3/UL (ref 150–450)
PO2 BLDV: 31 MM HG (ref 35–45)
PO2 BLDV: 60 MM HG (ref 35–45)
POTASSIUM BLDV-SCNC: 2.7 MMOL/L (ref 3.5–5.3)
POTASSIUM BLDV-SCNC: 3 MMOL/L (ref 3.5–5.3)
POTASSIUM SERPL-SCNC: 3.8 MMOL/L (ref 3.5–5.3)
PR INTERVAL: 198 MS
PROT SERPL-MCNC: 8.2 G/DL (ref 6.4–8.2)
PROT UR STRIP.AUTO-MCNC: ABNORMAL MG/DL
Q ONSET: 222 MS
QRS COUNT: 15 BEATS
QRS DURATION: 84 MS
QT INTERVAL: 402 MS
QTC CALCULATION(BAZETT): 483 MS
QTC FREDERICIA: 454 MS
R AXIS: 21 DEGREES
RBC # BLD AUTO: 3.24 X10*6/UL (ref 4–5.2)
RBC # UR STRIP.AUTO: ABNORMAL MG/DL
RBC #/AREA URNS AUTO: ABNORMAL /HPF
SAO2 % BLDV: 46 % (ref 45–75)
SAO2 % BLDV: 88 % (ref 45–75)
SODIUM BLDV-SCNC: 130 MMOL/L (ref 136–145)
SODIUM BLDV-SCNC: 134 MMOL/L (ref 136–145)
SODIUM SERPL-SCNC: 133 MMOL/L (ref 136–145)
SP GR UR STRIP.AUTO: 1.01
T AXIS: 48 DEGREES
T OFFSET: 423 MS
UROBILINOGEN UR STRIP.AUTO-MCNC: ABNORMAL MG/DL
VENTRICULAR RATE: 87 BPM
WBC # BLD AUTO: 5.1 X10*3/UL (ref 4.4–11.3)
WBC #/AREA URNS AUTO: ABNORMAL /HPF

## 2025-03-21 PROCEDURE — 85025 COMPLETE CBC W/AUTO DIFF WBC: CPT

## 2025-03-21 PROCEDURE — 36415 COLL VENOUS BLD VENIPUNCTURE: CPT

## 2025-03-21 PROCEDURE — 2500000004 HC RX 250 GENERAL PHARMACY W/ HCPCS (ALT 636 FOR OP/ED): Mod: SE | Performed by: EMERGENCY MEDICINE

## 2025-03-21 PROCEDURE — 80307 DRUG TEST PRSMV CHEM ANLYZR: CPT | Performed by: NURSE PRACTITIONER

## 2025-03-21 PROCEDURE — 93010 ELECTROCARDIOGRAM REPORT: CPT

## 2025-03-21 PROCEDURE — 83735 ASSAY OF MAGNESIUM: CPT

## 2025-03-21 PROCEDURE — 80053 COMPREHEN METABOLIC PANEL: CPT

## 2025-03-21 PROCEDURE — 99285 EMERGENCY DEPT VISIT HI MDM: CPT | Performed by: EMERGENCY MEDICINE

## 2025-03-21 PROCEDURE — 84132 ASSAY OF SERUM POTASSIUM: CPT

## 2025-03-21 PROCEDURE — 96375 TX/PRO/DX INJ NEW DRUG ADDON: CPT

## 2025-03-21 PROCEDURE — 96361 HYDRATE IV INFUSION ADD-ON: CPT

## 2025-03-21 PROCEDURE — 93005 ELECTROCARDIOGRAM TRACING: CPT

## 2025-03-21 PROCEDURE — 2550000001 HC RX 255 CONTRASTS: Mod: SE | Performed by: EMERGENCY MEDICINE

## 2025-03-21 PROCEDURE — 84132 ASSAY OF SERUM POTASSIUM: CPT | Performed by: EMERGENCY MEDICINE

## 2025-03-21 PROCEDURE — 84484 ASSAY OF TROPONIN QUANT: CPT

## 2025-03-21 PROCEDURE — 82077 ASSAY SPEC XCP UR&BREATH IA: CPT | Performed by: NURSE PRACTITIONER

## 2025-03-21 PROCEDURE — 74177 CT ABD & PELVIS W/CONTRAST: CPT | Performed by: RADIOLOGY

## 2025-03-21 PROCEDURE — 71046 X-RAY EXAM CHEST 2 VIEWS: CPT | Performed by: RADIOLOGY

## 2025-03-21 PROCEDURE — 84702 CHORIONIC GONADOTROPIN TEST: CPT | Performed by: NURSE PRACTITIONER

## 2025-03-21 PROCEDURE — 74177 CT ABD & PELVIS W/CONTRAST: CPT

## 2025-03-21 PROCEDURE — 71046 X-RAY EXAM CHEST 2 VIEWS: CPT

## 2025-03-21 PROCEDURE — 2500000002 HC RX 250 W HCPCS SELF ADMINISTERED DRUGS (ALT 637 FOR MEDICARE OP, ALT 636 FOR OP/ED): Mod: SE | Performed by: EMERGENCY MEDICINE

## 2025-03-21 PROCEDURE — 2500000004 HC RX 250 GENERAL PHARMACY W/ HCPCS (ALT 636 FOR OP/ED): Mod: SE

## 2025-03-21 PROCEDURE — 81001 URINALYSIS AUTO W/SCOPE: CPT

## 2025-03-21 PROCEDURE — 83690 ASSAY OF LIPASE: CPT

## 2025-03-21 PROCEDURE — 82010 KETONE BODYS QUAN: CPT

## 2025-03-21 PROCEDURE — 99285 EMERGENCY DEPT VISIT HI MDM: CPT | Mod: 25 | Performed by: EMERGENCY MEDICINE

## 2025-03-21 PROCEDURE — 82947 ASSAY GLUCOSE BLOOD QUANT: CPT

## 2025-03-21 PROCEDURE — 87210 SMEAR WET MOUNT SALINE/INK: CPT

## 2025-03-21 PROCEDURE — 96376 TX/PRO/DX INJ SAME DRUG ADON: CPT

## 2025-03-21 RX ORDER — THIAMINE HYDROCHLORIDE 100 MG/ML
100 INJECTION, SOLUTION INTRAMUSCULAR; INTRAVENOUS ONCE
Status: COMPLETED | OUTPATIENT
Start: 2025-03-21 | End: 2025-03-21

## 2025-03-21 RX ORDER — ONDANSETRON HYDROCHLORIDE 2 MG/ML
4 INJECTION, SOLUTION INTRAVENOUS ONCE
Status: COMPLETED | OUTPATIENT
Start: 2025-03-21 | End: 2025-03-21

## 2025-03-21 RX ORDER — MORPHINE SULFATE 4 MG/ML
4 INJECTION INTRAVENOUS ONCE
Status: COMPLETED | OUTPATIENT
Start: 2025-03-21 | End: 2025-03-21

## 2025-03-21 RX ORDER — POTASSIUM CHLORIDE 20 MEQ/1
40 TABLET, EXTENDED RELEASE ORAL ONCE
Status: COMPLETED | OUTPATIENT
Start: 2025-03-21 | End: 2025-03-21

## 2025-03-21 RX ORDER — DEXTROSE MONOHYDRATE 50 MG/ML
75 INJECTION, SOLUTION INTRAVENOUS CONTINUOUS
Status: ACTIVE | OUTPATIENT
Start: 2025-03-21 | End: 2025-03-22

## 2025-03-21 RX ORDER — POTASSIUM CHLORIDE 14.9 MG/ML
20 INJECTION INTRAVENOUS ONCE
Status: COMPLETED | OUTPATIENT
Start: 2025-03-21 | End: 2025-03-22

## 2025-03-21 RX ADMIN — POTASSIUM CHLORIDE 40 MEQ: 1500 TABLET, EXTENDED RELEASE ORAL at 23:46

## 2025-03-21 RX ADMIN — POTASSIUM CHLORIDE 20 MEQ: 14.9 INJECTION, SOLUTION INTRAVENOUS at 21:54

## 2025-03-21 RX ADMIN — MORPHINE SULFATE 4 MG: 4 INJECTION, SOLUTION INTRAMUSCULAR; INTRAVENOUS at 18:26

## 2025-03-21 RX ADMIN — ONDANSETRON 4 MG: 2 INJECTION INTRAMUSCULAR; INTRAVENOUS at 18:25

## 2025-03-21 RX ADMIN — DEXTROSE MONOHYDRATE 150 ML/HR: 50 INJECTION, SOLUTION INTRAVENOUS at 21:55

## 2025-03-21 RX ADMIN — IOHEXOL 75 ML: 350 INJECTION, SOLUTION INTRAVENOUS at 21:44

## 2025-03-21 RX ADMIN — SODIUM CHLORIDE, SODIUM LACTATE, POTASSIUM CHLORIDE, AND CALCIUM CHLORIDE 1000 ML: .6; .31; .03; .02 INJECTION, SOLUTION INTRAVENOUS at 18:49

## 2025-03-21 RX ADMIN — THIAMINE HYDROCHLORIDE 100 MG: 100 INJECTION, SOLUTION INTRAMUSCULAR; INTRAVENOUS at 23:46

## 2025-03-21 ASSESSMENT — PATIENT HEALTH QUESTIONNAIRE - PHQ9
2. FEELING DOWN, DEPRESSED OR HOPELESS: NEARLY EVERY DAY
SUM OF ALL RESPONSES TO PHQ9 QUESTIONS 1 AND 2: 3
1. LITTLE INTEREST OR PLEASURE IN DOING THINGS: NOT AT ALL

## 2025-03-21 ASSESSMENT — PAIN SCALES - GENERAL
PAINLEVEL_OUTOF10: 10 - WORST POSSIBLE PAIN
PAINLEVEL_OUTOF10: 10-WORST PAIN EVER
PAINLEVEL_OUTOF10: 10 - WORST POSSIBLE PAIN

## 2025-03-21 ASSESSMENT — PAIN DESCRIPTION - DESCRIPTORS: DESCRIPTORS: ACHING

## 2025-03-21 ASSESSMENT — COLUMBIA-SUICIDE SEVERITY RATING SCALE - C-SSRS
2. HAVE YOU ACTUALLY HAD ANY THOUGHTS OF KILLING YOURSELF?: NO
1. IN THE PAST MONTH, HAVE YOU WISHED YOU WERE DEAD OR WISHED YOU COULD GO TO SLEEP AND NOT WAKE UP?: NO
6. HAVE YOU EVER DONE ANYTHING, STARTED TO DO ANYTHING, OR PREPARED TO DO ANYTHING TO END YOUR LIFE?: NO

## 2025-03-21 ASSESSMENT — ENCOUNTER SYMPTOMS: DEPRESSION: 1

## 2025-03-21 ASSESSMENT — PAIN - FUNCTIONAL ASSESSMENT: PAIN_FUNCTIONAL_ASSESSMENT: 0-10

## 2025-03-21 NOTE — ED TRIAGE NOTES
Pt presents to ED for multiple medical complaints and was brought down by a provider from [facility]. Pt describes chest pain, abdominal pain, and pelvic pain. Pt states she has not been eating regularly. Endorses N/V/D. Pt states chest pain is similar to a chest pressure rated 10/10. PMHx of pancreatitis, seizures, and depression. Pt states she often throws up her medication though attempts to take them anyway. Denies dizziness and lightheadedness at this time.

## 2025-03-21 NOTE — PROGRESS NOTES
"Subjective   Patient ID: Shi Trevino is a 49 y.o. female who presents for Nausea, Abdominal Pain, and Rectal Pain.    Hx HTN, thrombocytopenia, EtOH pancreatitis, portal vein thrombosis, bipolar disorder/PTSD, ischemic CVA of temporal lobe, seizure disorder, asthma, active smoking     #abdominal pain    - 4-week duration    - \"Usually happens, but I can fight it off... since I was little\"    - Admits nausea, vomiting, constipation    - Reports weeks since last BM, no oral intake, unable to keep down water    - Tried Dulcolax weeks ago without relief    - Admits alcohol use 1 week ago (1 pint vodka)    - Denies hemoptysis      Review of Systems  As per HPI   Previous history  Past Medical History:   Diagnosis Date    Alcohol dependence, in remission     Alcohol dependence in remission    Alcoholic ketoacidosis 09/14/2020    Bacterial vaginosis 02/26/2023    Encounter for follow-up examination after completed treatment for conditions other than malignant neoplasm 01/24/2019    Surgery follow-up    Encounter for screening for infections with a predominantly sexual mode of transmission 07/02/2021    Screen for STD (sexually transmitted disease)    Encounter for screening for malignant neoplasm of cervix 07/02/2021    Cervical cancer screening    Epilepsy, unspecified, not intractable, without status epilepticus     Epileptic    Metabolic acidosis, normal anion gap (NAG) 10/09/2020    Personal history of diseases of the blood and blood-forming organs and certain disorders involving the immune mechanism 05/26/2021    History of anemia    Personal history of other diseases of the circulatory system     History of cardiomyopathy    Personal history of other diseases of the digestive system     History of chronic pancreatitis    Personal history of other diseases of the female genital tract 07/02/2021    History of vaginal pruritus    Personal history of other mental and behavioral disorders     History of post traumatic " stress disorder    Personal history of other specified conditions     History of diarrhea    Personal history of other specified conditions 08/06/2020    History of itching    Personal history of other specified conditions 07/13/2017    History of fatigue    Rhabdomyolysis 04/14/2016    Formatting of this note might be different from the original. - CK >7000 on admission likely secondary to volume depletion vs. Withdrawal vs. Intoxication - Contributing to AINSLEY - Fluid resuscitation, will continue mIVF - Trend CK Last Assessment & Plan: Formatting of this note might be different from the original. In the setting of proximal muscle weakness, difficulty maintaining upgaze. Baseline     Tremor, unspecified 05/30/2017    Spells of trembling    Unspecified protein-calorie malnutrition (Multi) 01/03/2019    Malnutrition     Past Surgical History:   Procedure Laterality Date    CT ABDOMEN PELVIS ANGIOGRAM W AND/OR WO IV CONTRAST  1/7/2019    CT ABDOMEN PELVIS ANGIOGRAM W AND/OR WO IV CONTRAST 1/7/2019 [facility] CLINICAL LEGAC[facility]    CT ABDOMEN PELVIS ANGIOGRAM W AND/OR WO IV CONTRAST  1/11/2019    CT ABDOMEN PELVIS ANGIOGRAM W AND/OR WO IV CONTRAST 1/11/2019 [facility] CLINICAL LEGACY    CT ABDOMEN PELVIS ANGIOGRAM W AND/OR WO IV CONTRAST  2/2/2019    CT ABDOMEN PELVIS ANGIOGRAM W AND/OR WO IV CONTRAST 2/2/2019 [facility] CLINICAL LEGACY    CT ABDOMEN PELVIS ANGIOGRAM W AND/OR WO IV CONTRAST  10/22/2018    CT ABDOMEN PELVIS ANGIOGRAM W AND/OR WO IV CONTRAST 10/22/2018 [facility] CLINICAL LEGACY    IR ANGIOGRAM INFERIOR EPIGASTRIC  1/7/2019    IR ANGIOGRAM INFERIOR EPIGASTRIC 1/7/2019 [facility] CLINICAL LEGAC[facility]    IR ANGIOGRAM INFERIOR EPIGASTRIC PELVIC  1/7/2019    IR ANGIOGRAM INFERIOR EPIGASTRIC PELVIC 1/7/2019 [facility] CLINICAL [facility]GAC[facility]    MR HEAD ANGIO WO IV CONTRAST  10/15/2018    MR HEAD ANGIO WO IV CONTRAST 10/15/2018 [facility]INICAL LEGAC[facility]    MR NECK ANGIO WO IV CONTRAST  10/15/2018    MR NECK ANGIO WO IV CONTRAST 10/15/2018 [facility]ICAL [facility]GAC[facility]    OTHER  SURGICAL HISTORY  12/20/2018    Cardiac catheterization    OTHER SURGICAL HISTORY  08/06/2020    Laparoscopy    OTHER SURGICAL HISTORY  08/06/2020    Hernia repair     Social History     Tobacco Use    Smoking status: Every Day     Current packs/day: 0.25     Average packs/day: 0.3 packs/day for 17.2 years (4.3 ttl pk-yrs)     Types: Cigarettes     Start date: 1/1/2008    Smokeless tobacco: Never   Vaping Use    Vaping status: Never Used   Substance Use Topics    Alcohol use: Yes     Comment: socially    Drug use: Yes     Types: Marijuana     Family History   Problem Relation Name Age of Onset    Diabetes Mother      Seizures Other       Allergies   Allergen Reactions    Ethinyl Estradiol Unknown     Other reaction(s): Unknown    Ethynodiol Diac-Eth Estradiol Unknown    Meperidine Unknown and Hives     Other reaction(s): Convulsions     Current Outpatient Medications   Medication Instructions    acetaminophen (TYLENOL) 1,000 mg, oral, Every 6 hours PRN    albuterol 90 mcg/actuation inhaler 1 puff, inhalation, Every 4 hours PRN    amLODIPine (NORVASC) 5 mg, oral, Daily    aspirin 81 mg, oral, Daily, CHEW AND SWALLOW    cholecalciferol (VITAMIN D-3) 2,000 Units, oral, Daily    Creon 12,000-38,000 -60,000 unit capsule 1 capsule, oral, 3 times daily (morning, midday, late afternoon)    cyanocobalamin (VITAMIN B-12) 1,000 mcg, oral, Daily, as directed    diclofenac sodium (Voltaren) 1 % gel APPLY 4G TO AFFECTED AREA FOUR TIMES DAILY.    dicyclomine (Bentyl) 10 mg capsule TAKE 1 CAPSULE BY MOUTH FOUR TIMES DAILY AS NEEDED FOR ABDOMINAL PAIN OR CRAMPS    docusate sodium (Colace) 100 mg capsule TAKE 1 CAPSULE BY MOUTH TWICE DAILY AS NEEDED FOR CONSTIPATION    famotidine (Pepcid) 20 mg tablet TAKE 1 TABLET BY MOUTH TWICE DAILY AS NEEDED FOR HEARTBURN    folic acid (FOLVITE) 1 mg, oral, Daily RT    gabapentin (NEURONTIN) 300 mg, oral, Daily RT    levETIRAcetam (KEPPRA) 1,000 mg, oral, 2 times daily    melatonin 3 mg, oral,  Nightly    metoprolol succinate XL (TOPROL-XL) 50 mg, oral, Daily    mirtazapine (REMERON) 7.5 mg, oral, Nightly    Mucus Relief  mg 12 hr tablet TAKE 2 TABLETS BY MOUTH TWICE DAILY AS NEEDED FOR COUGH. DO NOT CRUSH, CHEW OR SPLIT    multivitamin tablet 1 tablet, Daily RT    naltrexone (DEPADE) 50 mg, oral, Daily    pantoprazole (PROTONIX) 40 mg, oral, Daily before breakfast    pyridoxine (VITAMIN B-6) 100 mg, oral, Daily RT    rosuvastatin (CRESTOR) 40 mg, oral, Daily    thiamine (VITAMIN B-1) 100 mg, oral, Daily RT       Objective       Physical Exam  Constitutional:       Appearance: She is ill-appearing.   Eyes:      General: No scleral icterus.  Cardiovascular:      Rate and Rhythm: Normal rate and regular rhythm.   Pulmonary:      Effort: Pulmonary effort is normal.   Abdominal:      General: There is no distension.      Tenderness: There is abdominal tenderness.       Assessment/Plan   Shi Trevino is a 49 y.o. female with history of HTN, thrombocytopenia, EtOH pancreatitis, portal vein thrombosis, bipolar disorder/PTSD, ischemic CVA of temporal lobe, seizure disorder, asthma, active smoking  who presents for the concerns below:    #abdominal pain    :: bowel obstruction?    :: acute on chronic pancreatitis ?  PLAN    - Patient escorted to ED for further evaluation    Problem List Items Addressed This Visit       Alcohol-induced chronic pancreatitis (Multi) - Primary        Discussed with co-signer of note Dr. Felder      Portions of this note were generated using digital voice recognition software, and may contain grammatical errors       Ramesh Cummings, DO  Family Medicine PGY-3

## 2025-03-21 NOTE — ED PROVIDER NOTES
History of Present Illness     History provided by: Patient  Limitations to History: None  External Records Reviewed with Brief Summary: ***    HPI:  Shi Trevino is a 49 y.o. female with HTN, thrombocytopenia, EtOH pancreatitis, portal vein thrombosis, bipolar disorder/PTSD, ischemic CVA of temporal lobe, seizure disorder, asthma, active smoking the presents emergency room for abdominal pain, nausea, vomiting.  Patient was recently at [facility] and they recommended that she go to the emergency room the symptoms.  Patient states that the abdominal pain has happening for the past week.  Patient states that she last had a bowel movement about a week ago and is unsure of when she had flatus.  Patient does have a history of a .  Patient denies any medication, hematemesis.  Patient does state that she had 2 shots of liquor about 2 days ago however this is not a lot of alcohol from she usually is drinking.  Patient initially states that she has been having some chest pain however unsure exactly when this started has been having some intermittent sharp pain, does not radiate to the shoulder or back.  Patient not having any numbness or tingling of the upper extremities.  Patient does state that she has been having some vaginal bumps, denies any sexual activity, states that she has been having some drainage.  From notes previously, patient has been living significant mount of weight approximately 40 pounds.    Physical Exam   Triage vitals:  T 36.4 °C (97.6 °F)  HR (!) 108  BP 92/63  RR 18  O2 100 % None (Room air)    General: Awake, alert, in no acute distress  Eyes: Gaze conjugate.  No scleral icterus or injection  HENT: Normo-cephalic, atraumatic. No stridor  CV: Regular rate, regular rhythm. Radial pulses 2+ bilaterally  Resp: Breathing non-labored, speaking in full sentences.  Clear to auscultation bilaterally  GI: Soft, non-distended, diffuse tenderness to palpation, no rebound or  guarding.  : Deferred  MSK/Extremities: No gross bony deformities. Moving all extremities  Skin: Warm. Appropriate color  Neuro: Alert. Oriented. Face symmetric. Speech is fluent.  Gross strength and sensation intact in b/l UE and LEs  Psych: Appropriate mood and affect    Medical Decision Making & ED Course   Medical Decision Makin y.o. female with HTN, thrombocytopenia, EtOH pancreatitis, portal vein thrombosis, bipolar disorder/PTSD, ischemic CVA of temporal lobe, seizure disorder, asthma, active smoking the presents emergency room for abdominal pain, nausea, vomiting.  Is vitally stable with exception for tachycardia of 104.    Differential diagnosis includes: ***. Abdominal exam without peritoneal signs. No evidence of acute abdomen at this time. Well appearing. Low suspicion for acute hepatobiliary disease (includng acute cholecystitis), acute pancreatitis, PUD (including perforation), acute infectious processes (pneumonia, hepatitis, pyelonephritis), acute appendicitis, vascular catastrophe, bowel obstruction or viscus perforation. Presentation not consistent with other acute, emergent causes of abdominal pain at this time.    Plan: labs, UA, CT AP***, pain control, serial reassessment      ----  Scoring Tools Utilized: None     Social Determinants of Health which Significantly Impact Care: None identified The following actions were taken to address these social determinants: None    EKG Independent Interpretation: EKG interpreted by myself. Please see ED Course for full interpretation.    Independent Result Review and Interpretation: Relevant laboratory and radiographic results were reviewed and independently interpreted by myself.  As necessary, they are commented on in the ED Course.    Chronic conditions affecting the patient's care: As documented above in [facility]    The patient was discussed with the following consultants/services: None    Care Considerations: As documented above in [facility]    ED  Course:     Disposition   {ED Disposition:12564}    Procedures   Procedures    Patient seen and discussed with ED attending physician.    Lory Hayes MD  Emergency Medicine   pancreatitis.  Troponin is negative.  Urinalysis shows 2+ ketones however no evidence of urinary tract infection.  VBG shows pH of 7.33, CO2 of 33, bicarb of 17 with a lactate of 1.3.  Given patient does have an elevated anion gap this may be indicative of a alcoholic ketosis in combination with a starvation ketosis.  Beta hydroxybutyrate is elevated which does correlate with his as well.  Patient was given morphine, Zofran for the pain and nausea.  Patient was given a liter of LR and then was placed on dextrose 5 infusion given the starvation ketosis.  Patient given 40 mill equivalents of p.o. potassium along with 10 mEq of potassium IV.  Wet prep is still pending.  Given patient's CT showed acute on chronic pancreatitis, in the setting of starvation ketosis along with alcoholic ketosis, will be admitted to general medicine.   [YG]      ED Course User Index  [YG] Lory Hayes MD         Diagnoses as of 03/24/25 1808   Chronic pancreatitis, unspecified pancreatitis type (Multi)   Alcoholic ketoacidosis     Disposition   As a result of their workup, the patient will require admission to the hospital.  The patient was informed of her diagnosis.  The patient was given the opportunity to ask questions and I answered them. The patient agreed to be admitted to the hospital.    Procedures   Procedures    Patient seen and discussed with ED attending physician.    Lory Hayes MD  Emergency Medicine     Lory Hayes MD  Resident  03/24/25 1809       Lory Hayes MD  Resident  03/24/25 1810

## 2025-03-22 ENCOUNTER — CLINICAL SUPPORT (OUTPATIENT)
Dept: EMERGENCY MEDICINE | Facility: HOSPITAL | Age: 50
End: 2025-03-22
Payer: COMMERCIAL

## 2025-03-22 ENCOUNTER — APPOINTMENT (OUTPATIENT)
Dept: RADIOLOGY | Facility: HOSPITAL | Age: 50
End: 2025-03-22
Payer: COMMERCIAL

## 2025-03-22 PROBLEM — K86.1 CHRONIC PANCREATITIS, UNSPECIFIED PANCREATITIS TYPE (MULTI): Status: ACTIVE | Noted: 2025-03-22

## 2025-03-22 LAB
ALBUMIN SERPL BCP-MCNC: 3.5 G/DL (ref 3.4–5)
AMPHETAMINES UR QL SCN: ABNORMAL
ANION GAP SERPL CALC-SCNC: 18 MMOL/L (ref 10–20)
ANION GAP SERPL CALC-SCNC: 34 MMOL/L (ref 10–20)
ATRIAL RATE: 81 BPM
B-HCG SERPL-ACNC: <3 MIU/ML
BARBITURATES UR QL SCN: ABNORMAL
BENZODIAZ UR QL SCN: ABNORMAL
BUN SERPL-MCNC: 10 MG/DL (ref 6–23)
BUN SERPL-MCNC: 8 MG/DL (ref 6–23)
BZE UR QL SCN: ABNORMAL
CALCIUM SERPL-MCNC: 8.7 MG/DL (ref 8.6–10.6)
CALCIUM SERPL-MCNC: 9.1 MG/DL (ref 8.6–10.6)
CANNABINOIDS UR QL SCN: ABNORMAL
CHLORIDE SERPL-SCNC: 94 MMOL/L (ref 98–107)
CHLORIDE SERPL-SCNC: 96 MMOL/L (ref 98–107)
CLUE CELLS SPEC QL WET PREP: NORMAL
CO2 SERPL-SCNC: 13 MMOL/L (ref 21–32)
CO2 SERPL-SCNC: 21 MMOL/L (ref 21–32)
CREAT SERPL-MCNC: 0.86 MG/DL (ref 0.5–1.05)
CREAT SERPL-MCNC: 0.99 MG/DL (ref 0.5–1.05)
EGFRCR SERPLBLD CKD-EPI 2021: 70 ML/MIN/1.73M*2
EGFRCR SERPLBLD CKD-EPI 2021: 82 ML/MIN/1.73M*2
ERYTHROCYTE [DISTWIDTH] IN BLOOD BY AUTOMATED COUNT: 12.3 % (ref 11.5–14.5)
ETHANOL SERPL-MCNC: <10 MG/DL
FENTANYL+NORFENTANYL UR QL SCN: ABNORMAL
GLUCOSE SERPL-MCNC: 172 MG/DL (ref 74–99)
GLUCOSE SERPL-MCNC: 97 MG/DL (ref 74–99)
HCT VFR BLD AUTO: 25.7 % (ref 36–46)
HGB BLD-MCNC: 9.6 G/DL (ref 12–16)
HOLD SPECIMEN: NORMAL
MAGNESIUM SERPL-MCNC: 1.72 MG/DL (ref 1.6–2.4)
MCH RBC QN AUTO: 32.5 PG (ref 26–34)
MCHC RBC AUTO-ENTMCNC: 37.4 G/DL (ref 32–36)
MCV RBC AUTO: 87 FL (ref 80–100)
METHADONE UR QL SCN: ABNORMAL
NRBC BLD-RTO: 0 /100 WBCS (ref 0–0)
OPIATES UR QL SCN: ABNORMAL
OXYCODONE+OXYMORPHONE UR QL SCN: ABNORMAL
P AXIS: 48 DEGREES
P OFFSET: 175 MS
P ONSET: 128 MS
PCP UR QL SCN: ABNORMAL
PHOSPHATE SERPL-MCNC: 1.1 MG/DL (ref 2.5–4.9)
PLATELET # BLD AUTO: 91 X10*3/UL (ref 150–450)
POTASSIUM SERPL-SCNC: 2.9 MMOL/L (ref 3.5–5.3)
POTASSIUM SERPL-SCNC: 2.9 MMOL/L (ref 3.5–5.3)
PR INTERVAL: 186 MS
Q ONSET: 221 MS
QRS COUNT: 13 BEATS
QRS DURATION: 86 MS
QT INTERVAL: 386 MS
QTC CALCULATION(BAZETT): 448 MS
QTC FREDERICIA: 426 MS
R AXIS: 5 DEGREES
RBC # BLD AUTO: 2.95 X10*6/UL (ref 4–5.2)
SODIUM SERPL-SCNC: 132 MMOL/L (ref 136–145)
SODIUM SERPL-SCNC: 138 MMOL/L (ref 136–145)
T AXIS: 38 DEGREES
T OFFSET: 414 MS
T VAGINALIS SPEC QL WET PREP: NORMAL
VENTRICULAR RATE: 81 BPM
WBC # BLD AUTO: 4.3 X10*3/UL (ref 4.4–11.3)
WBC VAG QL WET PREP: NORMAL
YEAST VAG QL WET PREP: NORMAL

## 2025-03-22 PROCEDURE — 2500000004 HC RX 250 GENERAL PHARMACY W/ HCPCS (ALT 636 FOR OP/ED): Mod: SE | Performed by: STUDENT IN AN ORGANIZED HEALTH CARE EDUCATION/TRAINING PROGRAM

## 2025-03-22 PROCEDURE — 76705 ECHO EXAM OF ABDOMEN: CPT

## 2025-03-22 PROCEDURE — 36415 COLL VENOUS BLD VENIPUNCTURE: CPT | Performed by: NURSE PRACTITIONER

## 2025-03-22 PROCEDURE — 87491 CHLMYD TRACH DNA AMP PROBE: CPT

## 2025-03-22 PROCEDURE — 2500000004 HC RX 250 GENERAL PHARMACY W/ HCPCS (ALT 636 FOR OP/ED): Mod: SE | Performed by: INTERNAL MEDICINE

## 2025-03-22 PROCEDURE — G0378 HOSPITAL OBSERVATION PER HR: HCPCS

## 2025-03-22 PROCEDURE — 96374 THER/PROPH/DIAG INJ IV PUSH: CPT | Mod: 59

## 2025-03-22 PROCEDURE — 2500000004 HC RX 250 GENERAL PHARMACY W/ HCPCS (ALT 636 FOR OP/ED): Mod: SE | Performed by: NURSE PRACTITIONER

## 2025-03-22 PROCEDURE — 2500000004 HC RX 250 GENERAL PHARMACY W/ HCPCS (ALT 636 FOR OP/ED): Mod: SE

## 2025-03-22 PROCEDURE — 96365 THER/PROPH/DIAG IV INF INIT: CPT | Mod: 59

## 2025-03-22 PROCEDURE — 2500000002 HC RX 250 W HCPCS SELF ADMINISTERED DRUGS (ALT 637 FOR MEDICARE OP, ALT 636 FOR OP/ED): Mod: SE | Performed by: NURSE PRACTITIONER

## 2025-03-22 PROCEDURE — 76705 ECHO EXAM OF ABDOMEN: CPT | Performed by: RADIOLOGY

## 2025-03-22 PROCEDURE — 2500000001 HC RX 250 WO HCPCS SELF ADMINISTERED DRUGS (ALT 637 FOR MEDICARE OP): Mod: SE | Performed by: INTERNAL MEDICINE

## 2025-03-22 PROCEDURE — 2500000001 HC RX 250 WO HCPCS SELF ADMINISTERED DRUGS (ALT 637 FOR MEDICARE OP): Mod: SE | Performed by: STUDENT IN AN ORGANIZED HEALTH CARE EDUCATION/TRAINING PROGRAM

## 2025-03-22 PROCEDURE — 83735 ASSAY OF MAGNESIUM: CPT | Performed by: NURSE PRACTITIONER

## 2025-03-22 PROCEDURE — 80069 RENAL FUNCTION PANEL: CPT | Performed by: NURSE PRACTITIONER

## 2025-03-22 PROCEDURE — 2500000002 HC RX 250 W HCPCS SELF ADMINISTERED DRUGS (ALT 637 FOR MEDICARE OP, ALT 636 FOR OP/ED): Mod: SE | Performed by: INTERNAL MEDICINE

## 2025-03-22 PROCEDURE — 96372 THER/PROPH/DIAG INJ SC/IM: CPT | Performed by: NURSE PRACTITIONER

## 2025-03-22 PROCEDURE — 87661 TRICHOMONAS VAGINALIS AMPLIF: CPT

## 2025-03-22 PROCEDURE — 1200000002 HC GENERAL ROOM WITH TELEMETRY DAILY

## 2025-03-22 PROCEDURE — 96366 THER/PROPH/DIAG IV INF ADDON: CPT

## 2025-03-22 PROCEDURE — 2500000001 HC RX 250 WO HCPCS SELF ADMINISTERED DRUGS (ALT 637 FOR MEDICARE OP): Mod: SE | Performed by: NURSE PRACTITIONER

## 2025-03-22 PROCEDURE — 85027 COMPLETE CBC AUTOMATED: CPT | Performed by: NURSE PRACTITIONER

## 2025-03-22 PROCEDURE — 99242 OFF/OP CONSLTJ NEW/EST SF 20: CPT

## 2025-03-22 PROCEDURE — 2500000005 HC RX 250 GENERAL PHARMACY W/O HCPCS: Mod: SE | Performed by: INTERNAL MEDICINE

## 2025-03-22 PROCEDURE — 93005 ELECTROCARDIOGRAM TRACING: CPT

## 2025-03-22 PROCEDURE — 96375 TX/PRO/DX INJ NEW DRUG ADDON: CPT

## 2025-03-22 PROCEDURE — 99223 1ST HOSP IP/OBS HIGH 75: CPT | Performed by: NURSE PRACTITIONER

## 2025-03-22 PROCEDURE — 96376 TX/PRO/DX INJ SAME DRUG ADON: CPT

## 2025-03-22 RX ORDER — SODIUM BICARBONATE 650 MG/1
650 TABLET ORAL 2 TIMES DAILY
Status: DISCONTINUED | OUTPATIENT
Start: 2025-03-22 | End: 2025-03-23 | Stop reason: HOSPADM

## 2025-03-22 RX ORDER — TALC
3 POWDER (GRAM) TOPICAL NIGHTLY PRN
Status: DISCONTINUED | OUTPATIENT
Start: 2025-03-22 | End: 2025-03-23 | Stop reason: HOSPADM

## 2025-03-22 RX ORDER — MIRTAZAPINE 15 MG/1
7.5 TABLET, FILM COATED ORAL NIGHTLY
Status: DISCONTINUED | OUTPATIENT
Start: 2025-03-22 | End: 2025-03-23 | Stop reason: HOSPADM

## 2025-03-22 RX ORDER — LANOLIN ALCOHOL/MO/W.PET/CERES
100 CREAM (GRAM) TOPICAL
COMMUNITY
Start: 2024-11-07 | End: 2025-03-23

## 2025-03-22 RX ORDER — DICYCLOMINE HYDROCHLORIDE 10 MG/1
10 CAPSULE ORAL 4 TIMES DAILY PRN
Status: DISCONTINUED | OUTPATIENT
Start: 2025-03-22 | End: 2025-03-23 | Stop reason: HOSPADM

## 2025-03-22 RX ORDER — POLYETHYLENE GLYCOL 3350 17 G/17G
17 POWDER, FOR SOLUTION ORAL DAILY
Status: DISCONTINUED | OUTPATIENT
Start: 2025-03-22 | End: 2025-03-23 | Stop reason: HOSPADM

## 2025-03-22 RX ORDER — MULTIVIT-MIN/IRON FUM/FOLIC AC 7.5 MG-4
1 TABLET ORAL DAILY
Status: DISCONTINUED | OUTPATIENT
Start: 2025-03-22 | End: 2025-03-23 | Stop reason: HOSPADM

## 2025-03-22 RX ORDER — PANCRELIPASE 60000; 12000; 38000 [USP'U]/1; [USP'U]/1; [USP'U]/1
1 CAPSULE, DELAYED RELEASE PELLETS ORAL
COMMUNITY
Start: 2025-03-05 | End: 2025-03-23

## 2025-03-22 RX ORDER — ENOXAPARIN SODIUM 100 MG/ML
40 INJECTION SUBCUTANEOUS EVERY 24 HOURS
Status: DISCONTINUED | OUTPATIENT
Start: 2025-03-22 | End: 2025-03-23 | Stop reason: HOSPADM

## 2025-03-22 RX ORDER — CHOLECALCIFEROL (VITAMIN D3) 50 MCG
2000 TABLET ORAL DAILY
COMMUNITY
Start: 2025-03-05

## 2025-03-22 RX ORDER — KETOROLAC TROMETHAMINE 15 MG/ML
15 INJECTION, SOLUTION INTRAMUSCULAR; INTRAVENOUS EVERY 6 HOURS PRN
Status: DISCONTINUED | OUTPATIENT
Start: 2025-03-22 | End: 2025-03-23

## 2025-03-22 RX ORDER — SULFAMETHOXAZOLE AND TRIMETHOPRIM 800; 160 MG/1; MG/1
1 TABLET ORAL EVERY 12 HOURS SCHEDULED
Status: DISCONTINUED | OUTPATIENT
Start: 2025-03-22 | End: 2025-03-23 | Stop reason: HOSPADM

## 2025-03-22 RX ORDER — LANOLIN ALCOHOL/MO/W.PET/CERES
100 CREAM (GRAM) TOPICAL
Status: DISCONTINUED | OUTPATIENT
Start: 2025-03-22 | End: 2025-03-22

## 2025-03-22 RX ORDER — AMOXICILLIN 250 MG
2 CAPSULE ORAL 2 TIMES DAILY
Status: DISCONTINUED | OUTPATIENT
Start: 2025-03-22 | End: 2025-03-23 | Stop reason: HOSPADM

## 2025-03-22 RX ORDER — AMLODIPINE BESYLATE 5 MG/1
5 TABLET ORAL DAILY
Status: DISCONTINUED | OUTPATIENT
Start: 2025-03-22 | End: 2025-03-23 | Stop reason: HOSPADM

## 2025-03-22 RX ORDER — PANTOPRAZOLE SODIUM 40 MG/1
40 TABLET, DELAYED RELEASE ORAL DAILY
Status: DISCONTINUED | OUTPATIENT
Start: 2025-03-22 | End: 2025-03-23 | Stop reason: HOSPADM

## 2025-03-22 RX ORDER — POTASSIUM CHLORIDE 14.9 MG/ML
20 INJECTION INTRAVENOUS ONCE
Status: COMPLETED | OUTPATIENT
Start: 2025-03-22 | End: 2025-03-22

## 2025-03-22 RX ORDER — POLYETHYLENE GLYCOL 3350 17 G/17G
17 POWDER, FOR SOLUTION ORAL DAILY
Status: DISCONTINUED | OUTPATIENT
Start: 2025-03-22 | End: 2025-03-22

## 2025-03-22 RX ORDER — FOLIC ACID 1 MG/1
1 TABLET ORAL DAILY
Status: DISCONTINUED | OUTPATIENT
Start: 2025-03-22 | End: 2025-03-23 | Stop reason: HOSPADM

## 2025-03-22 RX ORDER — METOPROLOL SUCCINATE 50 MG/1
50 TABLET, EXTENDED RELEASE ORAL DAILY
Status: DISCONTINUED | OUTPATIENT
Start: 2025-03-22 | End: 2025-03-23

## 2025-03-22 RX ORDER — PYRIDOXINE HCL (VITAMIN B6) 25 MG
100 TABLET ORAL DAILY
Status: DISCONTINUED | OUTPATIENT
Start: 2025-03-22 | End: 2025-03-23 | Stop reason: HOSPADM

## 2025-03-22 RX ORDER — POTASSIUM CHLORIDE 1.5 G/1.58G
40 POWDER, FOR SOLUTION ORAL ONCE
Status: COMPLETED | OUTPATIENT
Start: 2025-03-22 | End: 2025-03-22

## 2025-03-22 RX ORDER — LANOLIN ALCOHOL/MO/W.PET/CERES
100 CREAM (GRAM) TOPICAL DAILY
Status: DISCONTINUED | OUTPATIENT
Start: 2025-03-22 | End: 2025-03-23 | Stop reason: HOSPADM

## 2025-03-22 RX ORDER — PROCHLORPERAZINE EDISYLATE 5 MG/ML
5 INJECTION INTRAMUSCULAR; INTRAVENOUS EVERY 6 HOURS PRN
Status: DISCONTINUED | OUTPATIENT
Start: 2025-03-22 | End: 2025-03-23 | Stop reason: HOSPADM

## 2025-03-22 RX ORDER — ROSUVASTATIN CALCIUM 20 MG/1
40 TABLET, COATED ORAL NIGHTLY
Status: DISCONTINUED | OUTPATIENT
Start: 2025-03-22 | End: 2025-03-23 | Stop reason: HOSPADM

## 2025-03-22 RX ORDER — GABAPENTIN 300 MG/1
300 CAPSULE ORAL DAILY
Status: DISCONTINUED | OUTPATIENT
Start: 2025-03-22 | End: 2025-03-23 | Stop reason: HOSPADM

## 2025-03-22 RX ORDER — LEVETIRACETAM 500 MG/1
1000 TABLET ORAL 2 TIMES DAILY
Status: DISCONTINUED | OUTPATIENT
Start: 2025-03-22 | End: 2025-03-23 | Stop reason: HOSPADM

## 2025-03-22 RX ORDER — FOLIC ACID 1 MG/1
1 TABLET ORAL
COMMUNITY
Start: 2024-11-07

## 2025-03-22 RX ORDER — MORPHINE SULFATE 4 MG/ML
4 INJECTION INTRAVENOUS ONCE
Status: COMPLETED | OUTPATIENT
Start: 2025-03-22 | End: 2025-03-22

## 2025-03-22 RX ORDER — NAPROXEN SODIUM 220 MG/1
81 TABLET, FILM COATED ORAL DAILY
Status: DISCONTINUED | OUTPATIENT
Start: 2025-03-22 | End: 2025-03-23 | Stop reason: HOSPADM

## 2025-03-22 RX ORDER — NALTREXONE HYDROCHLORIDE 50 MG/1
50 TABLET, FILM COATED ORAL DAILY
Status: DISCONTINUED | OUTPATIENT
Start: 2025-03-22 | End: 2025-03-22

## 2025-03-22 RX ORDER — CHOLECALCIFEROL (VITAMIN D3) 25 MCG
2000 TABLET ORAL DAILY
Status: DISCONTINUED | OUTPATIENT
Start: 2025-03-22 | End: 2025-03-23 | Stop reason: HOSPADM

## 2025-03-22 RX ORDER — LANOLIN ALCOHOL/MO/W.PET/CERES
400 CREAM (GRAM) TOPICAL DAILY
Status: DISCONTINUED | OUTPATIENT
Start: 2025-03-22 | End: 2025-03-23 | Stop reason: HOSPADM

## 2025-03-22 RX ORDER — POTASSIUM CHLORIDE 1.5 G/1.58G
40 POWDER, FOR SOLUTION ORAL 2 TIMES DAILY
Status: COMPLETED | OUTPATIENT
Start: 2025-03-22 | End: 2025-03-23

## 2025-03-22 RX ADMIN — Medication 100 MG: at 08:10

## 2025-03-22 RX ADMIN — PANCRELIPASE 1 CAPSULE: 60000; 12000; 38000 CAPSULE, DELAYED RELEASE PELLETS ORAL at 12:06

## 2025-03-22 RX ADMIN — MIRTAZAPINE 7.5 MG: 15 TABLET, FILM COATED ORAL at 22:02

## 2025-03-22 RX ADMIN — KETOROLAC TROMETHAMINE 15 MG: 15 INJECTION, SOLUTION INTRAMUSCULAR; INTRAVENOUS at 16:39

## 2025-03-22 RX ADMIN — PANCRELIPASE 1 CAPSULE: 60000; 12000; 38000 CAPSULE, DELAYED RELEASE PELLETS ORAL at 08:12

## 2025-03-22 RX ADMIN — SENNOSIDES AND DOCUSATE SODIUM 2 TABLET: 50; 8.6 TABLET ORAL at 08:11

## 2025-03-22 RX ADMIN — FOLIC ACID 1 MG: 1 TABLET ORAL at 08:11

## 2025-03-22 RX ADMIN — SODIUM BICARBONATE 650 MG: 650 TABLET ORAL at 22:02

## 2025-03-22 RX ADMIN — PROCHLORPERAZINE EDISYLATE 5 MG: 5 INJECTION INTRAMUSCULAR; INTRAVENOUS at 05:54

## 2025-03-22 RX ADMIN — MORPHINE SULFATE 4 MG: 4 INJECTION, SOLUTION INTRAMUSCULAR; INTRAVENOUS at 00:18

## 2025-03-22 RX ADMIN — HYDROMORPHONE HYDROCHLORIDE 0.2 MG: 1 INJECTION, SOLUTION INTRAMUSCULAR; INTRAVENOUS; SUBCUTANEOUS at 23:02

## 2025-03-22 RX ADMIN — KETOROLAC TROMETHAMINE 15 MG: 15 INJECTION, SOLUTION INTRAMUSCULAR; INTRAVENOUS at 22:41

## 2025-03-22 RX ADMIN — LEVETIRACETAM 1000 MG: 500 TABLET, FILM COATED ORAL at 08:11

## 2025-03-22 RX ADMIN — MAGNESIUM OXIDE TAB 400 MG (241.3 MG ELEMENTAL MG) 400 MG: 400 (241.3 MG) TAB at 12:06

## 2025-03-22 RX ADMIN — GABAPENTIN 300 MG: 300 CAPSULE ORAL at 08:11

## 2025-03-22 RX ADMIN — ENOXAPARIN SODIUM 40 MG: 100 INJECTION SUBCUTANEOUS at 08:10

## 2025-03-22 RX ADMIN — SODIUM BICARBONATE 650 MG: 650 TABLET ORAL at 08:11

## 2025-03-22 RX ADMIN — PANTOPRAZOLE SODIUM 40 MG: 40 TABLET, DELAYED RELEASE ORAL at 08:11

## 2025-03-22 RX ADMIN — ASPIRIN 81 MG: 81 TABLET, CHEWABLE ORAL at 08:11

## 2025-03-22 RX ADMIN — POLYETHYLENE GLYCOL 3350 17 G: 17 POWDER, FOR SOLUTION ORAL at 00:18

## 2025-03-22 RX ADMIN — DICYCLOMINE HYDROCHLORIDE 10 MG: 10 CAPSULE ORAL at 16:39

## 2025-03-22 RX ADMIN — DEXTROSE MONOHYDRATE 150 ML/HR: 50 INJECTION, SOLUTION INTRAVENOUS at 12:10

## 2025-03-22 RX ADMIN — POTASSIUM CHLORIDE 40 MEQ: 1.5 POWDER, FOR SOLUTION ORAL at 22:01

## 2025-03-22 RX ADMIN — ROSUVASTATIN CALCIUM 40 MG: 20 TABLET, FILM COATED ORAL at 22:02

## 2025-03-22 RX ADMIN — DEXTROSE MONOHYDRATE 150 ML/HR: 50 INJECTION, SOLUTION INTRAVENOUS at 05:08

## 2025-03-22 RX ADMIN — PANCRELIPASE 1 CAPSULE: 60000; 12000; 38000 CAPSULE, DELAYED RELEASE PELLETS ORAL at 16:33

## 2025-03-22 RX ADMIN — LEVETIRACETAM 1000 MG: 500 TABLET, FILM COATED ORAL at 22:02

## 2025-03-22 RX ADMIN — SULFAMETHOXAZOLE AND TRIMETHOPRIM 1 TABLET: 800; 160 TABLET ORAL at 22:02

## 2025-03-22 RX ADMIN — CHOLECALCIFEROL TAB 25 MCG (1000 UNIT) 2000 UNITS: 25 TAB at 08:11

## 2025-03-22 RX ADMIN — POTASSIUM CHLORIDE 40 MEQ: 1.5 POWDER, FOR SOLUTION ORAL at 12:06

## 2025-03-22 RX ADMIN — DEXTROSE MONOHYDRATE 75 ML/HR: 50 INJECTION, SOLUTION INTRAVENOUS at 19:11

## 2025-03-22 RX ADMIN — THIAMINE HCL TAB 100 MG 100 MG: 100 TAB at 08:21

## 2025-03-22 RX ADMIN — POTASSIUM CHLORIDE 20 MEQ: 14.9 INJECTION, SOLUTION INTRAVENOUS at 08:10

## 2025-03-22 RX ADMIN — POTASSIUM PHOSPHATE, MONOBASIC AND POTASSIUM PHOSPHATE, DIBASIC 21 MMOL: 224; 236 INJECTION, SOLUTION, CONCENTRATE INTRAVENOUS at 12:07

## 2025-03-22 RX ADMIN — Medication 1 TABLET: at 08:11

## 2025-03-22 RX ADMIN — POTASSIUM CHLORIDE 40 MEQ: 1.5 POWDER, FOR SOLUTION ORAL at 08:10

## 2025-03-22 ASSESSMENT — PAIN SCALES - GENERAL
PAINLEVEL_OUTOF10: 4
PAINLEVEL_OUTOF10: 6
PAINLEVEL_OUTOF10: 6
PAINLEVEL_OUTOF10: 0 - NO PAIN
PAINLEVEL_OUTOF10: 8

## 2025-03-22 ASSESSMENT — ENCOUNTER SYMPTOMS
WEAKNESS: 1
MUSCULOSKELETAL NEGATIVE: 1
UNEXPECTED WEIGHT CHANGE: 1
CHILLS: 0
ABDOMINAL DISTENTION: 0
VOMITING: 1
HEADACHES: 0
COUGH: 0
SHORTNESS OF BREATH: 0
DIFFICULTY URINATING: 0
NAUSEA: 1
CONFUSION: 0
WOUND: 1
APPETITE CHANGE: 1
ABDOMINAL PAIN: 1
FEVER: 0
CONSTIPATION: 1
TROUBLE SWALLOWING: 0
DIZZINESS: 0

## 2025-03-22 ASSESSMENT — PAIN DESCRIPTION - LOCATION
LOCATION: ABDOMEN
LOCATION: ABDOMEN

## 2025-03-22 ASSESSMENT — LIFESTYLE VARIABLES
EVER FELT BAD OR GUILTY ABOUT YOUR DRINKING: NO
EVER HAD A DRINK FIRST THING IN THE MORNING TO STEADY YOUR NERVES TO GET RID OF A HANGOVER: NO
HAVE YOU EVER FELT YOU SHOULD CUT DOWN ON YOUR DRINKING: NO
TOTAL SCORE: 0
HAVE PEOPLE ANNOYED YOU BY CRITICIZING YOUR DRINKING: NO

## 2025-03-22 ASSESSMENT — PAIN - FUNCTIONAL ASSESSMENT
PAIN_FUNCTIONAL_ASSESSMENT: 0-10

## 2025-03-22 NOTE — PROGRESS NOTES
Shi Trevino is a 49 y.o. female on day 0 of admission presenting with Chronic pancreatitis, unspecified pancreatitis type (Multi).    Past Medical History:   Diagnosis Date    Alcohol dependence, in remission     Alcohol dependence in remission    Alcoholic ketoacidosis 09/14/2020    Bacterial vaginosis 02/26/2023    Encounter for follow-up examination after completed treatment for conditions other than malignant neoplasm 01/24/2019    Surgery follow-up    Encounter for screening for infections with a predominantly sexual mode of transmission 07/02/2021    Screen for STD (sexually transmitted disease)    Encounter for screening for malignant neoplasm of cervix 07/02/2021    Cervical cancer screening    Epilepsy, unspecified, not intractable, without status epilepticus     Epileptic    Metabolic acidosis, normal anion gap (NAG) 10/09/2020    Personal history of diseases of the blood and blood-forming organs and certain disorders involving the immune mechanism 05/26/2021    History of anemia    Personal history of other diseases of the circulatory system     History of cardiomyopathy    Personal history of other diseases of the digestive system     History of chronic pancreatitis    Personal history of other diseases of the female genital tract 07/02/2021    History of vaginal pruritus    Personal history of other mental and behavioral disorders     History of post traumatic stress disorder    Personal history of other specified conditions     History of diarrhea    Personal history of other specified conditions 08/06/2020    History of itching    Personal history of other specified conditions 07/13/2017    History of fatigue    Rhabdomyolysis 04/14/2016    Formatting of this note might be different from the original. - CK >7000 on admission likely secondary to volume depletion vs. Withdrawal vs. Intoxication - Contributing to AINSLEY - Fluid resuscitation, will continue mIVF - Trend CK Last Assessment & Plan: Formatting  of this note might be different from the original. In the setting of proximal muscle weakness, difficulty maintaining upgaze. Baseline     Tremor, unspecified 05/30/2017    Spells of trembling    Unspecified protein-calorie malnutrition (Multi) 01/03/2019    Malnutrition     Past Surgical History:   Procedure Laterality Date    CT ABDOMEN PELVIS ANGIOGRAM W AND/OR WO IV CONTRAST  1/7/2019    CT ABDOMEN PELVIS ANGIOGRAM W AND/OR WO IV CONTRAST 1/7/2019 [facility]    CT ABDOMEN PELVIS ANGIOGRAM W AND/OR WO IV CONTRAST  1/11/2019    CT ABDOMEN PELVIS ANGIOGRAM W AND/OR WO IV CONTRAST 1/11/2019 [facility]GAC[facility]    CT ABDOMEN PELVIS ANGIOGRAM W AND/OR WO IV CONTRAST  2/2/2019    CT ABDOMEN PELVIS ANGIOGRAM W AND/OR WO IV CONTRAST 2/2/2019 [facility]GAC[facility]    CT ABDOMEN PELVIS ANGIOGRAM W AND/OR WO IV CONTRAST  10/22/2018    CT ABDOMEN PELVIS ANGIOGRAM W AND/OR WO IV CONTRAST 10/22/2018 [facility]GAC[facility]    IR ANGIOGRAM INFERIOR EPIGASTRIC  1/7/2019    IR ANGIOGRAM INFERIOR EPIGASTRIC 1/7/2019 [facility]GAC[facility]    IR ANGIOGRAM INFERIOR EPIGASTRIC PELVIC  1/7/2019    IR ANGIOGRAM INFERIOR EPIGASTRIC PELVIC 1/7/2019 [facility]GAC[facility]    MR HEAD ANGIO WO IV CONTRAST  10/15/2018    MR HEAD ANGIO WO IV CONTRAST 10/15/2018 [facility]GAC[facility]    MR NECK ANGIO WO IV CONTRAST  10/15/2018    MR NECK ANGIO WO IV CONTRAST 10/15/2018 [facility]    OTHER SURGICAL HISTORY  12/20/2018    Cardiac catheterization    OTHER SURGICAL HISTORY  08/06/2020    Laparoscopy    OTHER SURGICAL HISTORY  08/06/2020    Hernia repair     Social History     Socioeconomic History    Marital status: Single     Spouse name: Not on file    Number of children: Not on file    Years of education: Not on file    Highest education level: Not on file   Occupational History    Not on file   Tobacco Use    Smoking status: Every Day     Current packs/day: 0.25     Average packs/day: 0.3 packs/day for 17.2 years (4.3 ttl pk-yrs)      Types: Cigarettes     Start date: 1/1/2008    Smokeless tobacco: Never   Vaping Use    Vaping status: Never Used   Substance and Sexual Activity    Alcohol use: Yes     Comment: socially    Drug use: Yes     Types: Marijuana    Sexual activity: Defer   Other Topics Concern    Not on file   Social History Narrative    Not on file     Social Drivers of Health     Financial Resource Strain: Low Risk  (12/28/2023)    Overall Financial Resource Strain (CARDIA)     Difficulty of Paying Living Expenses: Not very hard   Food Insecurity: Not on File (9/26/2024)    Received from [facility]    Food Insecurity     Food: 0   Transportation Needs: No Transportation Needs (9/7/2024)    Received from [facility]    PRAPARE - Transportation     Lack of Transportation (Medical): No     Lack of Transportation (Non-Medical): No   Physical Activity: Not on File (7/18/2022)    Received from [facility]    Physical Activity     Physical Activity: 0   Stress: Not on File (9/13/2022)    Received from [employer]    Stress     Stress: 0   Social Connections: Not on File (9/13/2022)    Received from [employer]    Social Connections     Connectedness: 0   Recent Concern: Social Connections - At Risk (9/13/2022)    Received from [facility]    Social Connections     Social Connections and Isolation: 2   Intimate Partner Violence: Not on file   Housing Stability: Low Risk  (9/7/2024)    Received from [facility]    Housing Stability Vital Sign     Unable to Pay for Housing in the Last Year: No     Number of Times Moved in the Last Year: 1     Homeless in the Last Year: No     Allergies   Allergen Reactions    Ethinyl Estradiol Unknown     Other reaction(s): Unknown    Ethynodiol Diac-Eth Estradiol Unknown    Meperidine Unknown and Hives     Other reaction(s): Convulsions       Dietary Orders (From admission, onward)               Adult diet Clear Liquid  Diet effective now        Question:  Diet type  Answer:  Clear Liquid                       Objective     Vitals  Temperature:  [35.9 °C (96.7 °F)-36.6 °C (97.9 °F)] 36.6 °C (97.9 °F)  Heart Rate:  [] 74  Respirations:  [15-18] 16  BP: ()/(63-67) 96/67       0-10 (Numeric) Pain Score: 0 - No pain         Peripheral IV 03/22/25 22 G Left Hand (Active)   Number of days: 0       Vent Settings     No intake or output data in the 24 hours ending 03/22/25 1015    Relevant Results  Results for orders placed or performed during the hospital encounter of 03/21/25 (from the past 24 hours)   CBC and Auto Differential   Result Value Ref Range    WBC 5.1 4.4 - 11.3 x10*3/uL    nRBC 0.0 0.0 - 0.0 /100 WBCs    RBC 3.24 (L) 4.00 - 5.20 x10*6/uL    Hemoglobin 11.0 (L) 12.0 - 16.0 g/dL    Hematocrit 29.6 (L) 36.0 - 46.0 %    MCV 91 80 - 100 fL    MCH 34.0 26.0 - 34.0 pg    MCHC 37.2 (H) 32.0 - 36.0 g/dL    RDW 12.5 11.5 - 14.5 %    Platelets 133 (L) 150 - 450 x10*3/uL    Neutrophils % 64.1 40.0 - 80.0 %    Immature Granulocytes %, Automated 0.4 0.0 - 0.9 %    Lymphocytes % 24.4 13.0 - 44.0 %    Monocytes % 9.9 2.0 - 10.0 %    Eosinophils % 0.6 0.0 - 6.0 %    Basophils % 0.6 0.0 - 2.0 %    Neutrophils Absolute 3.29 1.20 - 7.70 x10*3/uL    Immature Granulocytes Absolute, Automated 0.02 0.00 - 0.70 x10*3/uL    Lymphocytes Absolute 1.25 1.20 - 4.80 x10*3/uL    Monocytes Absolute 0.51 0.10 - 1.00 x10*3/uL    Eosinophils Absolute 0.03 0.00 - 0.70 x10*3/uL    Basophils Absolute 0.03 0.00 - 0.10 x10*3/uL   Comprehensive metabolic panel   Result Value Ref Range    Glucose 101 (H) 74 - 99 mg/dL    Sodium 133 (L) 136 - 145 mmol/L    Potassium 3.8 3.5 - 5.3 mmol/L    Chloride 93 (L) 98 - 107 mmol/L    Bicarbonate 15 (L) 21 - 32 mmol/L    Anion Gap 29 (H) 10 - 20 mmol/L    Urea Nitrogen 11 6 - 23 mg/dL    Creatinine 1.02 0.50 - 1.05 mg/dL    eGFR 67 >60 mL/min/1.73m*2    Calcium 8.7 8.6 - 10.6 mg/dL    Albumin 3.9 3.4 - 5.0 g/dL    Alkaline Phosphatase 136 (H) 33 - 110 U/L    Total Protein 8.2 6.4 - 8.2 g/dL     (H)  9 - 39 U/L    Bilirubin, Total 0.6 0.0 - 1.2 mg/dL    ALT 51 (H) 7 - 45 U/L   Lipase   Result Value Ref Range    Lipase 17 9 - 82 U/L   Magnesium   Result Value Ref Range    Magnesium 2.15 1.60 - 2.40 mg/dL   Troponin I, High Sensitivity   Result Value Ref Range    Troponin I, High Sensitivity (CMC) 8 0 - 34 ng/L   Urinalysis with Reflex Culture and Microscopic   Result Value Ref Range    Color, Urine Light-Yellow Light-Yellow, Yellow, Dark-Yellow    Appearance, Urine Clear Clear    Specific Gravity, Urine 1.007 1.005 - 1.035    pH, Urine 7.0 5.0, 5.5, 6.0, 6.5, 7.0, 7.5, 8.0    Protein, Urine 50 (1+) (A) NEGATIVE, 10 (TRACE), 20 (TRACE) mg/dL    Glucose, Urine Normal Normal mg/dL    Blood, Urine 0.5 (2+) (A) NEGATIVE mg/dL    Ketones, Urine 60 (2+) (A) NEGATIVE mg/dL    Bilirubin, Urine NEGATIVE NEGATIVE mg/dL    Urobilinogen, Urine 2 (1+) (A) Normal mg/dL    Nitrite, Urine NEGATIVE NEGATIVE    Leukocyte Esterase, Urine NEGATIVE NEGATIVE   Extra Urine Gray Tube   Result Value Ref Range    Extra Tube Hold for add-ons.    Urinalysis Microscopic   Result Value Ref Range    WBC, Urine 6-10 (A) 1-5, NONE /HPF    RBC, Urine 6-10 (A) NONE, 1-2, 3-5 /HPF   DRUG SCREEN,URINE   Result Value Ref Range    Amphetamine Screen, Urine Presumptive Negative Presumptive Negative    Barbiturate Screen, Urine Presumptive Negative Presumptive Negative    Benzodiazepines Screen, Urine Presumptive Negative Presumptive Negative    Cannabinoid Screen, Urine Presumptive Positive (A) Presumptive Negative    Cocaine Metabolite Screen, Urine Presumptive Negative Presumptive Negative    Fentanyl Screen, Urine Presumptive Negative Presumptive Negative    Opiate Screen, Urine Presumptive Positive (A) Presumptive Negative    Oxycodone Screen, Urine Presumptive Negative Presumptive Negative    PCP Screen, Urine Presumptive Negative Presumptive Negative    Methadone Screen, Urine Presumptive Negative Presumptive Negative   BLOOD GAS VENOUS FULL PANEL    Result Value Ref Range    POCT pH, Venous 7.33 7.33 - 7.43 pH    POCT pCO2, Venous 33 (L) 41 - 51 mm Hg    POCT pO2, Venous 31 (L) 35 - 45 mm Hg    POCT SO2, Venous 46 45 - 75 %    POCT Oxy Hemoglobin, Venous 45.6 45.0 - 75.0 %    POCT Hematocrit Calculated, Venous 33.0 (L) 36.0 - 46.0 %    POCT Sodium, Venous 134 (L) 136 - 145 mmol/L    POCT Potassium, Venous 3.0 (L) 3.5 - 5.3 mmol/L    POCT Chloride, Venous 96 (L) 98 - 107 mmol/L    POCT Ionized Calicum, Venous 1.25 1.10 - 1.33 mmol/L    POCT Glucose, Venous 99 74 - 99 mg/dL    POCT Lactate, Venous 1.3 0.4 - 2.0 mmol/L    POCT Base Excess, Venous -7.6 (L) -2.0 - 3.0 mmol/L    POCT HCO3 Calculated, Venous 17.4 (L) 22.0 - 26.0 mmol/L    POCT Hemoglobin, Venous 11.1 (L) 12.0 - 16.0 g/dL    POCT Anion Gap, Venous 24.0 10.0 - 25.0 mmol/L    Patient Temperature 37.0 degrees Celsius    FiO2 21 %   Beta Hydroxybutyrate   Result Value Ref Range    Beta-Hydroxybutyrate 9.97 (H) 0.02 - 0.27 mmol/L   Basic metabolic panel   Result Value Ref Range    Glucose 97 74 - 99 mg/dL    Sodium 138 136 - 145 mmol/L    Potassium 2.9 (LL) 3.5 - 5.3 mmol/L    Chloride 94 (L) 98 - 107 mmol/L    Bicarbonate 13 (L) 21 - 32 mmol/L    Anion Gap 34 (H) 10 - 20 mmol/L    Urea Nitrogen 10 6 - 23 mg/dL    Creatinine 0.99 0.50 - 1.05 mg/dL    eGFR 70 >60 mL/min/1.73m*2    Calcium 9.1 8.6 - 10.6 mg/dL   Alcohol   Result Value Ref Range    Alcohol <10 <=10 mg/dL   Human Chorionic Gonadotropin, Serum Quantitative   Result Value Ref Range    HCG, Beta-Quantitative <3 <5 mIU/mL   ECG 12 Lead   Result Value Ref Range    Ventricular Rate 87 BPM    Atrial Rate 87 BPM    TN Interval 198 ms    QRS Duration 84 ms    QT Interval 402 ms    QTC Calculation(Bazett) 483 ms    P Axis 52 degrees    R Axis 21 degrees    T Axis 48 degrees    QRS Count 15 beats    Q Onset 222 ms    P Onset 123 ms    P Offset 175 ms    T Offset 423 ms    QTC Fredericia 454 ms   Wet prep, genital   Result Value Ref Range    Trichomonas  None Seen None Seen    Clue Cells None Seen None Seen    Yeast None Seen None Seen    WBC 9-20    BLOOD GAS VENOUS FULL PANEL   Result Value Ref Range    POCT pH, Venous 7.38 7.33 - 7.43 pH    POCT pCO2, Venous 28 (L) 41 - 51 mm Hg    POCT pO2, Venous 60 (H) 35 - 45 mm Hg    POCT SO2, Venous 88 (H) 45 - 75 %    POCT Oxy Hemoglobin, Venous 86.8 (H) 45.0 - 75.0 %    POCT Hematocrit Calculated, Venous 29.0 (L) 36.0 - 46.0 %    POCT Sodium, Venous 130 (L) 136 - 145 mmol/L    POCT Potassium, Venous 2.7 (LL) 3.5 - 5.3 mmol/L    POCT Chloride, Venous 97 (L) 98 - 107 mmol/L    POCT Ionized Calicum, Venous 1.22 1.10 - 1.33 mmol/L    POCT Glucose, Venous 145 (H) 74 - 99 mg/dL    POCT Lactate, Venous 0.7 0.4 - 2.0 mmol/L    POCT Base Excess, Venous -7.4 (L) -2.0 - 3.0 mmol/L    POCT HCO3 Calculated, Venous 16.6 (L) 22.0 - 26.0 mmol/L    POCT Hemoglobin, Venous 9.8 (L) 12.0 - 16.0 g/dL    POCT Anion Gap, Venous 19.0 10.0 - 25.0 mmol/L    Patient Temperature 37.0 degrees Celsius    FiO2 21 %   CBC   Result Value Ref Range    WBC 4.3 (L) 4.4 - 11.3 x10*3/uL    nRBC 0.0 0.0 - 0.0 /100 WBCs    RBC 2.95 (L) 4.00 - 5.20 x10*6/uL    Hemoglobin 9.6 (L) 12.0 - 16.0 g/dL    Hematocrit 25.7 (L) 36.0 - 46.0 %    MCV 87 80 - 100 fL    MCH 32.5 26.0 - 34.0 pg    MCHC 37.4 (H) 32.0 - 36.0 g/dL    RDW 12.3 11.5 - 14.5 %    Platelets 91 (L) 150 - 450 x10*3/uL   Renal Function Panel   Result Value Ref Range    Glucose 172 (H) 74 - 99 mg/dL    Sodium 132 (L) 136 - 145 mmol/L    Potassium 2.9 (LL) 3.5 - 5.3 mmol/L    Chloride 96 (L) 98 - 107 mmol/L    Bicarbonate 21 21 - 32 mmol/L    Anion Gap 18 10 - 20 mmol/L    Urea Nitrogen 8 6 - 23 mg/dL    Creatinine 0.86 0.50 - 1.05 mg/dL    eGFR 82 >60 mL/min/1.73m*2    Calcium 8.7 8.6 - 10.6 mg/dL    Phosphorus 1.1 (L) 2.5 - 4.9 mg/dL    Albumin 3.5 3.4 - 5.0 g/dL   Magnesium   Result Value Ref Range    Magnesium 1.72 1.60 - 2.40 mg/dL     Scheduled medications  [Held by provider] amLODIPine, 5 mg, oral,  Daily  aspirin, 81 mg, oral, Daily  cholecalciferol, 2,000 Units, oral, Daily  enoxaparin, 40 mg, subcutaneous, q24h  folic acid, 1 mg, oral, Daily  gabapentin, 300 mg, oral, Daily  HYDROmorphone, 0.5 mg, intravenous, Once  levETIRAcetam, 1,000 mg, oral, BID  [Held by provider] metoprolol succinate XL, 50 mg, oral, Daily  mirtazapine, 7.5 mg, oral, Nightly  multivitamin with minerals, 1 tablet, oral, Daily  pancrelipase (Lip-Prot-Amyl), 1 capsule, oral, TID  pantoprazole, 40 mg, oral, Daily  polyethylene glycol, 17 g, oral, Daily  pyridoxine, 100 mg, oral, Daily  rosuvastatin, 40 mg, oral, Nightly  sennosides-docusate sodium, 2 tablet, oral, BID  sodium bicarbonate, 650 mg, oral, BID  thiamine, 100 mg, oral, Daily      Continuous medications  dextrose 5%, 150 mL/hr, Last Rate: 150 mL/hr (03/22/25 1006)      PRN medications  PRN medications: dicyclomine, ketorolac, melatonin, prochlorperazine             Assessment & Plan  Chronic pancreatitis, unspecified pancreatitis type (Multi)      Assessment/Plan          S/  Seen and examined  No new complaints no new event over the night   ROS: Negative    O/   General Appearance: Alert, Oriented X3, Cooperative, Not in Acute Distress  HEENT: no eye redness, not pale, no jaundice, Throat: not congested, no ulcers    Chest: Good AE bilateral, No crackles, no ronchies, no wheezes.   Heart: RHR, Normal heart sounds S1, S2, no murmur or gallop,   Abdomen: Soft, lax, no hepatosplenomegaly, intact hernia orifices, negative shaw sign, no tenderness,   Genitalia: no abnormal discharge, no ulcers, no swelling.  Lymphatics: no lymphadenopathy, supraclavicular, inguinal trochlear, or axilla.   Neurology: Intact cranial nerves 2 to 12, intact sensation, intact motor function (Power, tone and reflexes). Normal DTR reflexes.   Extremities: no signs of PAD, no swelling no ulcers   Back: no deformity, no SI tenderness, no ulcers.   Skin: no signs of dehydration, no Rash, Bruises, or  "purpura.      AS:    Ms. Shi Trevino is a 49 y.o. female with a PMH of HTN, thrombocytopenia, alcohol induced pancreatitis, portal vein thrombosis, bipolar disorder/PTSD, ischemic CVA of temporal lobe, seizure disorder, asthma, and smoker, admitted for Acute/chronic abdominal pain due to pancreatitis, with metabolic acidosis. Started on IVF, elyte replacement, Thiamin nad pain control no indication for aBX at this time. CT scan showed chronic pancreatitis no cyst no peroration on obstruction.   PT/OT consult.     #. Acute/ Chronic alcohol induced pancreatitis  #. Abdominal pain acute/chronic  #. Metabolic acidosis, HAGMA due to alcohol \" AKA\".   #. Hypokalemia, Hyponatremia  - beta hydroxybutyrate 9.97, gap 29, bicarb 15, +2 ketones in urine on admit  - pt denies daily alcohol use and reports that she had 2 shots of vodka 2 days prior to ED presentation  - CT A/P with contrast on admit showed chronic pancreatitis and chronic gastritis/PUE  - Lipase 17 on admit,   - Compazine 5 mg IV push q6h PRN n/v  - clear liquid diet advance as tolerated to regular diet  - Toradol 15 mg q6h PRN moderate to severe pain (x2 days)  - c/w home dose of Creon 1 capsule TID with meals  - c/w home dose of Protonix 40 mg PO every day  - c/w home dose of Bentyl 10 mg PO QID PRN abdominal discomfort  - last colonoscopy done 9/11/24: One 12 mm polyp in the mid sigmoid colon, removed with a hot snare. Resected and retrieved. Diverticulosis in the left colon. Non-bleeding internal hemorrhoids.  - last EGD done 5/17/23: Endosonographic imaging of the pancreas showed sonographic changes of severe chronic diffusely calcific pancreatitis. There was no stone in the common bile duct. No specimens collected.  - s/p D5 infusion at 150 ml/hr    - monitor pt for re-feeding syndrome  - start sodium bicarb 650 mg PO BID  - s/p potassium chloride 40 mEq PO x1 dose and 20 mEq IV x1 dose while in Ed  #. Vaginal Mass:  #. Bartholin's abscess (Ruptured)  - " consult  Obs/gyne rec:   - Left Bartholin abscess ruptured with minimal pus expression on palpation  - STI testing: GC-CT, Trich ordered  - Bactrim BID for 7 days on discharge  - Tylenol, Ibuprofen for pain management  - Office visit with Gynecology to be scheduled by our team for follow up and routine Gynecology exam including screening.   Prolonged QTC   - EKG on admit showed NSR with prolonged QTC of 483  - will replace e lyte and repeat.   - monitor on tele  - K>4 and Mg>2     #. Alcoholic liver disease  #. Thrombocytopenia   #. Transaminates  -  on admit-->  on 9/14/24  - Alk P 136, Ast 119, and ALT 51 on admit  - could be 2/2 alcohol use and dehydration   - continue to monitor   - liver Us     #. Unintentional weight loss  - likely due to alcoholic pancreatitis, malabsorption   - nutrition consult placed   - wt on admit: 105 lb--> wt 125 lbs 11/7/24--> wt 120 lbs 6/10/24--> wt 106 lbs 12/27/23  - CT scan no malignancy   - Liver US   - outpateint f/p with GI-hepatology and age appropriate screening for malignancy       HTN  - last /66--> BP 92/63 on arrival to ED  - continue to monitor BP  - holding home dose of Toprol XL 50 mg PO every day and Norvasc 5 mg PO every day d/t soft BP      Code Status: Full  DVT ppx:  enoxaparin   Disp: PT/ot     Spoke to my patient, Discussed the medical, social conditions, the reason for this admission explained our plan and recommendations.  Time spent on the assessment of patient, gathering and interpreting data, review of medical record/patient history, personally reviewing radiographic imaging. With greater than 50% spent in personal discussion with patient.  Time > 55 min         Tone Ponce MD

## 2025-03-22 NOTE — H&P
History Of Present Illness  Shi Trevino is a 49 y.o. female with a PMH of HTN, thrombocytopenia, alcohol induced pancreatitis, portal vein thrombosis, bipolar disorder/PTSD, ischemic CVA of temporal lobe, seizure disorder, asthma, alcohol use disorder, and current smoker. She presented to the ED with c/o abdominal pain, nausea, vomiting, and constipation that started last week. She rates abdominal pain as 8/10 and it is present around umbilicus and bilateral lower quadrants of abdomen. Pt did not take any medications to help manage her symptoms prior to ED presentation. She also endorses bumps on her vagina that itch per her report along with unintentional weight loss of around 40 lbs per pt in the last year. Prior to ED presentation pt was seen in family medicine clinic and they recommended that she go to the ED for further evaluation of her symptoms. Labs reviewed and notable for elevated beta hydroxybutyrate, thrombocytopenia, anion gap metabolic acidosis, transaminitis, hyponatremia, anemia, and hypokalemia. Utox positive for opiates and marijuana. Lipase on admit 17. Ketones present in urine on admit. Elevated beta hydroxybutyrate likely 2/2 starvation ketoacidosis and D5 infusion at 150 ml/hr was initiated while in the ED. CT A/P with contrast showed findings suggestive of chronic pancreatitis and chronic gastritis/PUD. Last admit per chart review was at [facility] 9/6/24-9/14/24 due to starvation ketoacidosis and acute on chronic pancreatitis. She was seen by GI during this admit and colonoscopy was done on 9/11/24. Nutrition consult placed due to concern for malnutrition and pt report of unintentional weight loss. Pt admitted to medicine for further treatment of chronic pancreatitis and starvation ketoacidosis.    ED inteventions: Thiamine 100 mg IV push x1, Zofran 4 mg IV push x1 dose, Morphine 4 mg IV x1 dose, 1L LR bolus x1, potassium chloride 40 mEq PO x1 dose, potassium chloride 20 mEq IV x1 dose, and  D5 infusion at 150 ml/hr    Past Medical History  Past Medical History:   Diagnosis Date    Alcohol dependence, in remission     Alcohol dependence in remission    Alcoholic ketoacidosis 09/14/2020    Bacterial vaginosis 02/26/2023    Encounter for follow-up examination after completed treatment for conditions other than malignant neoplasm 01/24/2019    Surgery follow-up    Encounter for screening for infections with a predominantly sexual mode of transmission 07/02/2021    Screen for STD (sexually transmitted disease)    Encounter for screening for malignant neoplasm of cervix 07/02/2021    Cervical cancer screening    Epilepsy, unspecified, not intractable, without status epilepticus     Epileptic    Metabolic acidosis, normal anion gap (NAG) 10/09/2020    Personal history of diseases of the blood and blood-forming organs and certain disorders involving the immune mechanism 05/26/2021    History of anemia    Personal history of other diseases of the circulatory system     History of cardiomyopathy    Personal history of other diseases of the digestive system     History of chronic pancreatitis    Personal history of other diseases of the female genital tract 07/02/2021    History of vaginal pruritus    Personal history of other mental and behavioral disorders     History of post traumatic stress disorder    Personal history of other specified conditions     History of diarrhea    Personal history of other specified conditions 08/06/2020    History of itching    Personal history of other specified conditions 07/13/2017    History of fatigue    Rhabdomyolysis 04/14/2016    Formatting of this note might be different from the original. - CK >7000 on admission likely secondary to volume depletion vs. Withdrawal vs. Intoxication - Contributing to AINSLEY - Fluid resuscitation, will continue mIVF - Trend CK Last Assessment & Plan: Formatting of this note might be different from the original. In the setting of proximal muscle  weakness, difficulty maintaining upgaze. Baseline     Tremor, unspecified 05/30/2017    Spells of trembling    Unspecified protein-calorie malnutrition (Multi) 01/03/2019    Malnutrition       Surgical History  Past Surgical History:   Procedure Laterality Date    CT ABDOMEN PELVIS ANGIOGRAM W AND/OR WO IV CONTRAST  1/7/2019    CT ABDOMEN PELVIS ANGIOGRAM W AND/OR WO IV CONTRAST 1/7/2019 [facility] CLINICAL LEGACY    CT ABDOMEN PELVIS ANGIOGRAM W AND/OR WO IV CONTRAST  1/11/2019    CT ABDOMEN PELVIS ANGIOGRAM W AND/OR WO IV CONTRAST 1/11/2019 [facility] CLINICAL LEGACY    CT ABDOMEN PELVIS ANGIOGRAM W AND/OR WO IV CONTRAST  2/2/2019    CT ABDOMEN PELVIS ANGIOGRAM W AND/OR WO IV CONTRAST 2/2/2019 [facility] CLINICAL LEGACY    CT ABDOMEN PELVIS ANGIOGRAM W AND/OR WO IV CONTRAST  10/22/2018    CT ABDOMEN PELVIS ANGIOGRAM W AND/OR WO IV CONTRAST 10/22/2018 [facility] CLINICAL LEGACY    IR ANGIOGRAM INFERIOR EPIGASTRIC  1/7/2019    IR ANGIOGRAM INFERIOR EPIGASTRIC 1/7/2019 [facility] CLINICAL LEGACY    IR ANGIOGRAM INFERIOR EPIGASTRIC PELVIC  1/7/2019    IR ANGIOGRAM INFERIOR EPIGASTRIC PELVIC 1/7/2019 [facility] CLINICAL LEGAC[facility]    MR HEAD ANGIO WO IV CONTRAST  10/15/2018    MR HEAD ANGIO WO IV CONTRAST 10/15/2018 [facility] CLINICAL LEGACY    MR NECK ANGIO WO IV CONTRAST  10/15/2018    MR NECK ANGIO WO IV CONTRAST 10/15/2018 [facility] CLINICAL LEGAC[facility]    OTHER SURGICAL HISTORY  12/20/2018    Cardiac catheterization    OTHER SURGICAL HISTORY  08/06/2020    Laparoscopy    OTHER SURGICAL HISTORY  08/06/2020    Hernia repair        Social History  She reports that she has been smoking cigarettes. She started smoking about 17 years ago. She has a 4.3 pack-year smoking history. She has never used smokeless tobacco. She reports current alcohol use. She reports current drug use. Drug: Marijuana.    Family History  Family History   Problem Relation Name Age of Onset    Diabetes Mother      Seizures Other          Allergies  Ethinyl estradiol, Ethynodiol diac-eth estradiol,  "and Meperidine    Review of Systems   Constitutional:  Positive for appetite change and unexpected weight change. Negative for chills and fever.   HENT:  Negative for congestion and trouble swallowing.    Respiratory:  Negative for cough and shortness of breath.    Cardiovascular:  Negative for chest pain.   Gastrointestinal:  Positive for abdominal pain, constipation, nausea and vomiting. Negative for abdominal distention.   Genitourinary:  Negative for difficulty urinating.   Musculoskeletal: Negative.    Skin:  Positive for wound.   Neurological:  Positive for weakness. Negative for dizziness and headaches.   Psychiatric/Behavioral:  Negative for confusion.         Physical Exam  Constitutional:       General: She is not in acute distress.  HENT:      Head: Normocephalic.      Nose: Nose normal.      Mouth/Throat:      Mouth: Mucous membranes are dry.   Cardiovascular:      Rate and Rhythm: Normal rate.      Pulses: Normal pulses.      Heart sounds: Normal heart sounds.   Pulmonary:      Effort: Pulmonary effort is normal. No respiratory distress.      Breath sounds: Normal breath sounds.   Abdominal:      General: Bowel sounds are normal.      Palpations: Abdomen is soft.   Musculoskeletal:         General: Normal range of motion.   Skin:     General: Skin is warm.      Comments: Bumps on vagina per report of pt (not assessed per pt request)   Neurological:      Mental Status: She is alert and oriented to person, place, and time.          Last Recorded Vitals  Blood pressure 111/66, pulse 82, temperature 36.4 °C (97.6 °F), resp. rate 15, height 1.575 m (5' 2\"), weight 47.6 kg (105 lb), SpO2 100%.    Relevant Results  CT abdomen pelvis w IV contrast    Result Date: 3/21/2025  STUDY: CT ABDOMEN PELVIS W IV CONTRAST;  3/21/2025 9:50 pm   INDICATION: Signs/Symptoms:abdominal pain. 49-year-old female with history of alcohol pancreatitis presenting with abdominal pain, nausea and vomiting.   COMPARISON: CT abdomen and " pelvis 04/25/2024   ACCESSION NUMBER(S): [id number]   ORDERING CLINICIAN: MCKENZIE WILLIAMSON   TECHNIQUE: Contiguous axial images of the abdomen and pelvis were obtained after the intravenous administration of 75 mL Omnipaque 350 contrast. Coronal and sagittal reformatted images were reconstructed from the axial data.   FINDINGS: LOWER CHEST: Bilateral lung bases are clear. Mild right basilar atelectasis. No focal consolidation, pleural effusion or pneumothorax. Heart is normal in size. No pericardial effusion.   LIVER: Liver measures 17.6 cm in craniocaudal length. There is diffuse hypoattenuation of the liver parenchyma which appears slightly improved since prior CT. No significant parenchymal abnormality.   BILE DUCTS: There is mild intrahepatic biliary dilatation. The common bile duct is persistently enlarged measuring 0.9 cm (series 201, image 32), similar to prior. Dilation of the common bile duct may be due to stenosis in the pancreatic body secondary to chronic pancreatitis.   GALLBLADDER: Gallbladder is within normal limits. No gallstones, gallbladder wall thickening, or pericholecystic fluid to suggest acute cholecystitis.   PANCREAS: Redemonstration of pancreatic atrophy with innumerable coarse calcifications, likely sequela of chronic pancreatitis. No pancreatic ductal dilatation. No peripancreatic fluid collections. No surrounding inflammatory changes to suggest superimposed acute pancreatitis.   SPLEEN: No significant abnormality.   ADRENALS: No significant abnormality.   KIDNEYS, URETERS, BLADDER: Bilateral kidneys are normal in size and enhance symmetrically. No focal lesions. No nephrolithiasis or hydroureteronephrosis. The bladder is within normal limits.   REPRODUCTIVE ORGANS: Uterus is present and appears within normal limits. New right adnexal cyst measuring 1.7 x 1.4 cm (series 201, image 95).   GI: Redemonstration of diffuse gastric wall thickening as well as thickening of the pylorus. The small  and large bowel are normal in caliber without inflammatory changes, or abnormal enhancement. Scattered colonic diverticulosis without acute diverticulitis. There is focal wall thickening atf the cecum which is new since prior CT (series 203, image 57). Moderate colonic stool burden in the rectum. The appendix is not definitely visualized however no inflammatory changes in the expected location to suggest acute appendicitis.   VESSELS: No significant abnormality. The portal veins and IVC are patent. Mild atherosclerotic calcifications of the abdominal aorta and its branches. Splenic embolization coils noted with chronic thrombosis of the splenic vein. Stable gastric varices along the lesser curvature.   PERITONEUM/RETROPERITONEUM: There is similar degree of trace left subdiaphragmatic free fluid (series 201, image 25). No pneumoperitoneum or loculated fluid collections.   LYMPH NODES: No enlarged lymph nodes.   ABDOMINAL WALL: No significant abnormality.   OSSEOUS STRUCTURES: No acute osseous abnormality.       1. No acute abnormality within the abdomen or pelvis. 2. Sequela of chronic pancreatitis without findings to suggest superimposed acute pancreatitis. No peripancreatic pseudocysts. 3. Similar degree of diffuse gastric and pyloric wall thickening which can be seen in the setting of chronic gastritis/peptic ulcer disease. No evidence of a perforated ulcer. 4. Focal wall thickening of the cecum which may reflect an area of peristalsis however underlying malignancy is not entirely excluded. Recommend correlating with colonoscopy. 5. New right adnexal cyst, likely physiologic. 6. Additional stable findings as above.   I personally reviewed the images/study and I agree with radiology resident Dr. Sid Joyce findings as stated. This study was interpreted at [facility], [place], [place]   MACRO: None     Dictation workstation:   [id number]    ECG 12 Lead    Result Date:  3/21/2025  Normal sinus rhythm Prolonged QT Abnormal ECG When compared with ECG of 25-APR-2024 16:42, No significant change was found See ED provider note for full interpretation and clinical correlation Confirmed by Dina Murray ([id number]) on 3/21/2025 9:47:04 PM    XR chest 2 views    Result Date: 3/21/2025  Interpreted By:  Yaron Lopez and Hofer Lindsay STUDY: XR CHEST 2 VIEWS;  3/21/2025 7:08 pm   INDICATION: Signs/Symptoms:chest pain.     COMPARISON: Chest radiograph 12/27/2023.   ACCESSION NUMBER(S): [id number]   ORDERING CLINICIAN: MCKENZIE WILLIAMSON   FINDINGS: PA and lateral radiographs of the chest were provided.     CARDIOMEDIASTINAL SILHOUETTE: Cardiomediastinal silhouette is normal in size and configuration.   LUNGS: No focal pulmonary consolidations. No pleural effusions or pneumothorax seen.   ABDOMEN: Surgical clips overlying the left upper quadrant. The upper abdomen is otherwise unremarkable.   BONES: No acute osseous changes.       1.  No evidence of acute cardiopulmonary process.   I personally reviewed the images/study and resident's interpretation and I agree with the findings as stated by Pau Mercado MD (resident radiologist). This study was analyzed and interpreted at [facility], [place], [place].   MACRO: None   Signed by: Yaron Lopez 3/21/2025 9:46 PM Dictation workstation:   [id number]      Results for orders placed or performed during the hospital encounter of 03/21/25 (from the past 24 hours)   CBC and Auto Differential   Result Value Ref Range    WBC 5.1 4.4 - 11.3 x10*3/uL    nRBC 0.0 0.0 - 0.0 /100 WBCs    RBC 3.24 (L) 4.00 - 5.20 x10*6/uL    Hemoglobin 11.0 (L) 12.0 - 16.0 g/dL    Hematocrit 29.6 (L) 36.0 - 46.0 %    MCV 91 80 - 100 fL    MCH 34.0 26.0 - 34.0 pg    MCHC 37.2 (H) 32.0 - 36.0 g/dL    RDW 12.5 11.5 - 14.5 %    Platelets 133 (L) 150 - 450 x10*3/uL    Neutrophils % 64.1 40.0 - 80.0 %    Immature Granulocytes %, Automated 0.4  0.0 - 0.9 %    Lymphocytes % 24.4 13.0 - 44.0 %    Monocytes % 9.9 2.0 - 10.0 %    Eosinophils % 0.6 0.0 - 6.0 %    Basophils % 0.6 0.0 - 2.0 %    Neutrophils Absolute 3.29 1.20 - 7.70 x10*3/uL    Immature Granulocytes Absolute, Automated 0.02 0.00 - 0.70 x10*3/uL    Lymphocytes Absolute 1.25 1.20 - 4.80 x10*3/uL    Monocytes Absolute 0.51 0.10 - 1.00 x10*3/uL    Eosinophils Absolute 0.03 0.00 - 0.70 x10*3/uL    Basophils Absolute 0.03 0.00 - 0.10 x10*3/uL   Comprehensive metabolic panel   Result Value Ref Range    Glucose 101 (H) 74 - 99 mg/dL    Sodium 133 (L) 136 - 145 mmol/L    Potassium 3.8 3.5 - 5.3 mmol/L    Chloride 93 (L) 98 - 107 mmol/L    Bicarbonate 15 (L) 21 - 32 mmol/L    Anion Gap 29 (H) 10 - 20 mmol/L    Urea Nitrogen 11 6 - 23 mg/dL    Creatinine 1.02 0.50 - 1.05 mg/dL    eGFR 67 >60 mL/min/1.73m*2    Calcium 8.7 8.6 - 10.6 mg/dL    Albumin 3.9 3.4 - 5.0 g/dL    Alkaline Phosphatase 136 (H) 33 - 110 U/L    Total Protein 8.2 6.4 - 8.2 g/dL     (H) 9 - 39 U/L    Bilirubin, Total 0.6 0.0 - 1.2 mg/dL    ALT 51 (H) 7 - 45 U/L   Lipase   Result Value Ref Range    Lipase 17 9 - 82 U/L   Magnesium   Result Value Ref Range    Magnesium 2.15 1.60 - 2.40 mg/dL   Troponin I, High Sensitivity   Result Value Ref Range    Troponin I, High Sensitivity (CMC) 8 0 - 34 ng/L   Urinalysis with Reflex Culture and Microscopic   Result Value Ref Range    Color, Urine Light-Yellow Light-Yellow, Yellow, Dark-Yellow    Appearance, Urine Clear Clear    Specific Gravity, Urine 1.007 1.005 - 1.035    pH, Urine 7.0 5.0, 5.5, 6.0, 6.5, 7.0, 7.5, 8.0    Protein, Urine 50 (1+) (A) NEGATIVE, 10 (TRACE), 20 (TRACE) mg/dL    Glucose, Urine Normal Normal mg/dL    Blood, Urine 0.5 (2+) (A) NEGATIVE mg/dL    Ketones, Urine 60 (2+) (A) NEGATIVE mg/dL    Bilirubin, Urine NEGATIVE NEGATIVE mg/dL    Urobilinogen, Urine 2 (1+) (A) Normal mg/dL    Nitrite, Urine NEGATIVE NEGATIVE    Leukocyte Esterase, Urine NEGATIVE NEGATIVE    Urinalysis Microscopic   Result Value Ref Range    WBC, Urine 6-10 (A) 1-5, NONE /HPF    RBC, Urine 6-10 (A) NONE, 1-2, 3-5 /HPF   DRUG SCREEN,URINE   Result Value Ref Range    Amphetamine Screen, Urine Presumptive Negative Presumptive Negative    Barbiturate Screen, Urine Presumptive Negative Presumptive Negative    Benzodiazepines Screen, Urine Presumptive Negative Presumptive Negative    Cannabinoid Screen, Urine Presumptive Positive (A) Presumptive Negative    Cocaine Metabolite Screen, Urine Presumptive Negative Presumptive Negative    Fentanyl Screen, Urine Presumptive Negative Presumptive Negative    Opiate Screen, Urine Presumptive Positive (A) Presumptive Negative    Oxycodone Screen, Urine Presumptive Negative Presumptive Negative    PCP Screen, Urine Presumptive Negative Presumptive Negative    Methadone Screen, Urine Presumptive Negative Presumptive Negative   BLOOD GAS VENOUS FULL PANEL   Result Value Ref Range    POCT pH, Venous 7.33 7.33 - 7.43 pH    POCT pCO2, Venous 33 (L) 41 - 51 mm Hg    POCT pO2, Venous 31 (L) 35 - 45 mm Hg    POCT SO2, Venous 46 45 - 75 %    POCT Oxy Hemoglobin, Venous 45.6 45.0 - 75.0 %    POCT Hematocrit Calculated, Venous 33.0 (L) 36.0 - 46.0 %    POCT Sodium, Venous 134 (L) 136 - 145 mmol/L    POCT Potassium, Venous 3.0 (L) 3.5 - 5.3 mmol/L    POCT Chloride, Venous 96 (L) 98 - 107 mmol/L    POCT Ionized Calicum, Venous 1.25 1.10 - 1.33 mmol/L    POCT Glucose, Venous 99 74 - 99 mg/dL    POCT Lactate, Venous 1.3 0.4 - 2.0 mmol/L    POCT Base Excess, Venous -7.6 (L) -2.0 - 3.0 mmol/L    POCT HCO3 Calculated, Venous 17.4 (L) 22.0 - 26.0 mmol/L    POCT Hemoglobin, Venous 11.1 (L) 12.0 - 16.0 g/dL    POCT Anion Gap, Venous 24.0 10.0 - 25.0 mmol/L    Patient Temperature 37.0 degrees Celsius    FiO2 21 %   Beta Hydroxybutyrate   Result Value Ref Range    Beta-Hydroxybutyrate 9.97 (H) 0.02 - 0.27 mmol/L   ECG 12 Lead   Result Value Ref Range    Ventricular Rate 87 BPM    Atrial  Rate 87 BPM    NY Interval 198 ms    QRS Duration 84 ms    QT Interval 402 ms    QTC Calculation(Bazett) 483 ms    P Axis 52 degrees    R Axis 21 degrees    T Axis 48 degrees    QRS Count 15 beats    Q Onset 222 ms    P Onset 123 ms    P Offset 175 ms    T Offset 423 ms    QTC Fredericia 454 ms   Wet prep, genital   Result Value Ref Range    Trichomonas None Seen None Seen    Clue Cells None Seen None Seen    Yeast None Seen None Seen    WBC 9-20    BLOOD GAS VENOUS FULL PANEL   Result Value Ref Range    POCT pH, Venous 7.38 7.33 - 7.43 pH    POCT pCO2, Venous 28 (L) 41 - 51 mm Hg    POCT pO2, Venous 60 (H) 35 - 45 mm Hg    POCT SO2, Venous 88 (H) 45 - 75 %    POCT Oxy Hemoglobin, Venous 86.8 (H) 45.0 - 75.0 %    POCT Hematocrit Calculated, Venous 29.0 (L) 36.0 - 46.0 %    POCT Sodium, Venous 130 (L) 136 - 145 mmol/L    POCT Potassium, Venous 2.7 (LL) 3.5 - 5.3 mmol/L    POCT Chloride, Venous 97 (L) 98 - 107 mmol/L    POCT Ionized Calicum, Venous 1.22 1.10 - 1.33 mmol/L    POCT Glucose, Venous 145 (H) 74 - 99 mg/dL    POCT Lactate, Venous 0.7 0.4 - 2.0 mmol/L    POCT Base Excess, Venous -7.4 (L) -2.0 - 3.0 mmol/L    POCT HCO3 Calculated, Venous 16.6 (L) 22.0 - 26.0 mmol/L    POCT Hemoglobin, Venous 9.8 (L) 12.0 - 16.0 g/dL    POCT Anion Gap, Venous 19.0 10.0 - 25.0 mmol/L    Patient Temperature 37.0 degrees Celsius    FiO2 21 %     Scheduled medications  [Held by provider] amLODIPine, 5 mg, oral, Daily  aspirin, 81 mg, oral, Daily  cholecalciferol, 2,000 Units, oral, Daily  enoxaparin, 40 mg, subcutaneous, q24h  folic acid, 1 mg, oral, Daily  gabapentin, 300 mg, oral, Daily  HYDROmorphone, 0.5 mg, intravenous, Once  levETIRAcetam, 1,000 mg, oral, BID  [Held by provider] metoprolol succinate XL, 50 mg, oral, Daily  mirtazapine, 7.5 mg, oral, Nightly  multivitamin with minerals, 1 tablet, oral, Daily  pancrelipase (Lip-Prot-Amyl), 1 capsule, oral, TID  pantoprazole, 40 mg, oral, Daily  polyethylene glycol, 17 g, oral,  Daily  pyridoxine, 100 mg, oral, Daily  rosuvastatin, 40 mg, oral, Nightly  sennosides-docusate sodium, 2 tablet, oral, BID  sodium bicarbonate, 650 mg, oral, BID  thiamine, 100 mg, oral, Daily      Continuous medications  dextrose 5%, 150 mL/hr, Last Rate: 150 mL/hr (03/22/25 0000)      PRN medications  PRN medications: dicyclomine, ketorolac, melatonin, prochlorperazine        Assessment/Plan     Shi Trevino is a 49 year old male with a PMH of HTN, thrombocytopenia, alcohol induced pancreatitis, portal vein thrombosis, bipolar disorder/PTSD, ischemic CVA of temporal lobe, seizure disorder, asthma, alcohol use disorder, and current smoker. She presented to the ED with c/o abdominal pain, nausea, vomiting, and constipation that started last week. She rates abdominal pain as 8/10 and it is present around umbilicus and bilateral lower quadrants of abdomen. Pt did not take any medications to help manage her symptoms prior to ED presentation. She also endorses bumps on her vagina that itch per her report along with unintentional weight loss of around 40 lbs per pt in the last year. Prior to ED presentation pt was seen in family medicine clinic and they recommended that she go to the ED for further evaluation of her symptoms. Labs notable for elevated beta hydroxybutyrate, anion gap metabolic acidosis, transaminitis, hyponatremia, anemia, and hypokalemia. Utox positive for opiates and marijuana. Lipase on admit 17. Ketones present in urine on admit. Elevated beta hydroxybutyrate likely 2/2 starvation ketoacidosis and D5 infusion at 150 ml/hr was initiated while in the ED. CT A/P with contrast showed findings suggestive of chronic pancreatitis and chronic gastritis/PUD. Pt admitted to medicine for further treatment of chronic pancreatitis and starvation ketoacidosis.    Chronic alcohol induced pancreatitis  - pt denies daily alcohol use and reports that she had 2 shots of vodka 2 days prior to ED presentation  - CT A/P  with contrast on admit showed chronic pancreatitis and chronic gastritis/PUE  - Lipase 17 on admit, alcohol level pending result  - continue supportive care and management  - Compazine 5 mg IV push q6h PRN n/v  - clear liquid diet advance as tolerated to regular diet  - Toradol 15 mg q6h PRN moderate to severe pain (x2 days)  - c/w home dose of Creon 1 capsule TID with meals  - c/w home dose of Protonix 40 mg PO every day  - c/w home dose of Bentyl 10 mg PO QID PRN abdominal discomfort  - last seen by GI during hospitalization at [facility] last September  - last colonoscopy done 9/11/24: One 12 mm polyp in the mid sigmoid colon, removed with a hot snare. Resected and retrieved. Diverticulosis in the left colon. Non-bleeding internal hemorrhoids.  - last EGD done 5/17/23: Endosonographic imaging of the pancreas showed sonographic changes of severe chronic diffusely calcific pancreatitis. There was no stone in the common bile duct. No specimens collected.    Starvation ketoacidosis  Anion gap metabolic acidosis  - beta hydroxybutyrate 9.97, gap 29, bicarb 15, +2 ketones in urine on admit  - c/w D5 infusion at 150 ml/hr initiated while in the ED  - monitor pt for re-feeding syndrome  - start sodium bicarb 650 mg PO BID  - RFP ordered for AM draw, BMP pending result at time of admit    Hypokalemia  Hyponatremia  - likely 2/2 GI loss from vomiting  - Na 133 on admit  - K 3.0 and 2.7 on VBG but 3.8 on CMP obtained on admit  - s/p potassium chloride 40 mEq PO x1 dose and 20 mEq IV x1 dose while in Ed  - BMP pending result    Prolonged QTC   - EKG on admit showed NSR with prolonged QTC of 483  - monitor on tele  - K>4 and Mg>2  - repeat EKG in AM for QTC check    Thrombocytopenia   Transaminitis  -  on admit-->  on 9/14/24  - Alk P 136, Ast 119, and ALT 51 on admit  - could be 2/2 alcohol use and dehydration   - continue to monitor     Unintentional weight loss  - nutrition consult placed   - wt on admit: 105  lb--> wt 125 lbs 11/7/24--> wt 120 lbs 6/10/24--> wt 106 lbs 12/27/23  - obtain standing weight in AM    HTN  - last /66--> BP 92/63 on arrival to ED  - continue to monitor BP  - holding home dose of Toprol XL 50 mg PO every day and Norvasc 5 mg PO every day d/t soft BP    Dispo: admit to [facility]. Plan per above. Estimated LOS: 1-2 days    I spent 75 minutes in the professional and overall care of this patient.      Sada Talamantes, APRN-CNP

## 2025-03-22 NOTE — CONSULTS
Obstetrical Consult    Reason for Consult: vaginal bleeding and vulvar swelling.    HPI  Shi Trevino is a 49 y.o.  female presenting to the ED with abdominal pain, nausea, vomiting which started last week. On presentation patient reported vulvar painful mass and bloody discharge on wiping.   Patient reports the mass was bigger, painful and it busted open and drained pus this morning, now her pain is better. Reports that this is the first time she has tis complaint, she is not sexually active for the last year, has never had postmenopausal bleeding, has not seen a gynecologist in years. Denies dysuria, hematuria, blood in stool, diarrhea. States she has constipation, pain around belly button, weight loss.     Obstetrical History     SVDx1  CSx1    Gyn:  Reports never had PAP smear  Denies STIs  Menopause: Doesn't remember last period    Past Medical History  Past Medical History:   Diagnosis Date    Alcohol dependence, in remission     Alcohol dependence in remission    Alcoholic ketoacidosis 2020    Bacterial vaginosis 2023    Encounter for follow-up examination after completed treatment for conditions other than malignant neoplasm 2019    Surgery follow-up    Encounter for screening for infections with a predominantly sexual mode of transmission 2021    Screen for STD (sexually transmitted disease)    Encounter for screening for malignant neoplasm of cervix 2021    Cervical cancer screening    Epilepsy, unspecified, not intractable, without status epilepticus     Epileptic    Metabolic acidosis, normal anion gap (NAG) 10/09/2020    Personal history of diseases of the blood and blood-forming organs and certain disorders involving the immune mechanism 2021    History of anemia    Personal history of other diseases of the circulatory system     History of cardiomyopathy    Personal history of other diseases of the digestive system     History of chronic pancreatitis     Personal history of other diseases of the female genital tract 07/02/2021    History of vaginal pruritus    Personal history of other mental and behavioral disorders     History of post traumatic stress disorder    Personal history of other specified conditions     History of diarrhea    Personal history of other specified conditions 08/06/2020    History of itching    Personal history of other specified conditions 07/13/2017    History of fatigue    Rhabdomyolysis 04/14/2016    Formatting of this note might be different from the original. - CK >7000 on admission likely secondary to volume depletion vs. Withdrawal vs. Intoxication - Contributing to AINSLEY - Fluid resuscitation, will continue mIVF - Trend CK Last Assessment & Plan: Formatting of this note might be different from the original. In the setting of proximal muscle weakness, difficulty maintaining upgaze. Baseline     Tremor, unspecified 05/30/2017    Spells of trembling    Unspecified protein-calorie malnutrition (Multi) 01/03/2019    Malnutrition        Past Surgical History   Past Surgical History:   Procedure Laterality Date    CT ABDOMEN PELVIS ANGIOGRAM W AND/OR WO IV CONTRAST  1/7/2019    CT ABDOMEN PELVIS ANGIOGRAM W AND/OR WO IV CONTRAST 1/7/2019 [facility]GAC[facility]    CT ABDOMEN PELVIS ANGIOGRAM W AND/OR WO IV CONTRAST  1/11/2019    CT ABDOMEN PELVIS ANGIOGRAM W AND/OR WO IV CONTRAST 1/11/2019 [facility] CLINICAL LEGAC[facility]    CT ABDOMEN PELVIS ANGIOGRAM W AND/OR WO IV CONTRAST  2/2/2019    CT ABDOMEN PELVIS ANGIOGRAM W AND/OR WO IV CONTRAST 2/2/2019 [facility] CL[facility] LEGAC[facility]    CT ABDOMEN PELVIS ANGIOGRAM W AND/OR WO IV CONTRAST  10/22/2018    CT ABDOMEN PELVIS ANGIOGRAM W AND/OR WO IV CONTRAST 10/22/2018 [facility] CL[facility]ICAL LEGAC[facility]    IR ANGIOGRAM INFERIOR EPIGASTRIC  1/7/2019    IR ANGIOGRAM INFERIOR EPIGASTRIC 1/7/2019 [facility] CL[facility]ICAL LEGAC[facility]    IR ANGIOGRAM INFERIOR EPIGASTRIC PELVIC  1/7/2019    IR ANGIOGRAM INFERIOR EPIGASTRIC PELVIC 1/7/2019 [facility] CL[facility]ICAL [facility]GAC[facility]     MR HEAD ANGIO WO IV CONTRAST  10/15/2018    MR HEAD ANGIO WO IV CONTRAST 10/15/2018 [facility] CLINICAL LEGAC[facility]    MR NECK ANGIO WO IV CONTRAST  10/15/2018    MR NECK ANGIO WO IV CONTRAST 10/15/2018 [facility] CLINICAL LEGAC[facility]    OTHER SURGICAL HISTORY  2018    Cardiac catheterization    OTHER SURGICAL HISTORY  2020    Laparoscopy    OTHER SURGICAL HISTORY  2020    Hernia repair       Social History  Social History     Tobacco Use    Smoking status: Every Day     Current packs/day: 0.25     Average packs/day: 0.3 packs/day for 17.2 years (4.3 ttl pk-yrs)     Types: Cigarettes     Start date: 2008    Smokeless tobacco: Never   Substance Use Topics    Alcohol use: Yes     Comment: socially     Substance and Sexual Activity   Drug Use Yes    Types: Marijuana       Allergies  Ethinyl estradiol, Ethynodiol diac-eth estradiol, and Meperidine       Objective    Last Vitals  Temp Pulse Resp BP MAP O2 Sat   36.4 °C (97.5 °F) 72 18 93/61 97 98 %     Physical Examination  General: no acute distress  HEENT: normocephalic, atraumatic  Lungs: no increased WOB,  Abd: soft, nontender  Extremities: moving all extremities  :   Left vulvar 1.5 cm swelling with minimal pus expression, otherwise normal  Cervix, vagina pale, atrophic on SSE, uterus , adnexa normal on bimanual exam      Lab Review  Lab Results   Component Value Date    WBC 4.3 (L) 2025    HGB 9.6 (L) 2025    HCT 25.7 (L) 2025    MCV 87 2025    PLT 91 (L) 2025     Lab Results   Component Value Date    GLUCOSE 172 (H) 2025    CALCIUM 8.7 2025     (L) 2025    K 2.9 (LL) 2025    CO2 21 2025    CL 96 (L) 2025    BUN 8 2025    CREATININE 0.86 2025       Assessment/Plan     Shi Trevino is a 49 y.o.  female presenting with Bartholin's abscess.    Bartholin's abscess (Ruptured)  - Left Bartholin abscess ruptured with minimal pus expression on palpation  - STI testing: GC-CT, Trich  ordered  - Bactrim BID for 7 days on discharge  - Tylenol, Ibuprofen for pain management  - Office visit with Gynecology to be scheduled by our team for follow up and routine Gynecology exam including screening.    Seen and d/w Dr. Deanna Figueroa MD PGY2

## 2025-03-22 NOTE — PROGRESS NOTES
Pharmacy Medication History Review    Shi Trevino is a 49 y.o. female admitted for Chronic pancreatitis, unspecified pancreatitis type (Multi). Pharmacy reviewed the patient's [id number] medications and allergies for accuracy.    Medications ADDED:  none  Medications CHANGED:  none  Medications REMOVED:   none     The list below reflects the updated PTA list.   Prior to Admission Medications   Prescriptions Last Dose Informant   Creon 12,000-38,000 -60,000 unit capsule 3/21/2025 Self   Sig: Take 1 capsule by mouth 3 times daily (morning, midday, late afternoon).   Mucus Relief  mg 12 hr tablet 3/21/2025 Self   Sig: TAKE 2 TABLETS BY MOUTH TWICE DAILY AS NEEDED FOR COUGH. DO NOT CRUSH, CHEW OR SPLIT   acetaminophen (Tylenol) 500 mg tablet Past Week Self   Sig: Take 2 tablets (1,000 mg) by mouth every 6 hours if needed.   albuterol 90 mcg/actuation inhaler 3/21/2025 Self   Sig: Inhale 1 puff every 4 hours if needed for wheezing.   amLODIPine (Norvasc) 5 mg tablet 3/21/2025 Self   Sig: Take 1 tablet (5 mg) by mouth once daily.   aspirin 81 mg chewable tablet 3/21/2025 Self   Sig: Chew 1 tablet (81 mg) once daily. CHEW AND SWALLOW   cholecalciferol (Vitamin D-3) 50 MCG (2000 UT) tablet 3/21/2025 Self   Sig: Take 1 tablet (2,000 Units) by mouth once daily.   cyanocobalamin (Vitamin B-12) 1,000 mcg tablet 3/21/2025 Self   Sig: Take 1 tablet (1,000 mcg) by mouth once daily. as directed   diclofenac sodium (Voltaren) 1 % gel Past Week Self   Sig: APPLY 4G TO AFFECTED AREA FOUR TIMES DAILY.   dicyclomine (Bentyl) 10 mg capsule 3/21/2025 Self   Sig: TAKE 1 CAPSULE BY MOUTH FOUR TIMES DAILY AS NEEDED FOR ABDOMINAL PAIN OR CRAMPS   docusate sodium (Colace) 100 mg capsule 3/21/2025 Self   Sig: TAKE 1 CAPSULE BY MOUTH TWICE DAILY AS NEEDED FOR CONSTIPATION   famotidine (Pepcid) 20 mg tablet Past Week Self   Sig: TAKE 1 TABLET BY MOUTH TWICE DAILY AS NEEDED FOR HEARTBURN   folic acid (Folvite) 1 mg tablet 3/21/2025  Self   Sig: Take 1 tablet (1 mg) by mouth once daily.   gabapentin (Neurontin) 300 mg capsule 3/21/2025 Self   Sig: Take 1 capsule (300 mg) by mouth once daily.   levETIRAcetam (Keppra) 1,000 mg tablet 3/21/2025 Self   Sig: Take 1 tablet (1,000 mg) by mouth 2 times a day.   melatonin 3 mg tablet 3/21/2025 Self   Sig: TAKE 1 TABLET BY MOUTH DAILY AT BEDTIME   metoprolol succinate XL (Toprol-XL) 50 mg 24 hr tablet 3/21/2025 Self   Sig: Take 1 tablet (50 mg) by mouth once daily.   mirtazapine (Remeron) 7.5 mg tablet 3/21/2025 Self   Sig: Take 1 tablet (7.5 mg) by mouth once daily at bedtime.   multivitamin tablet 3/21/2025 Self   Sig: Take 1 tablet by mouth once daily.   naltrexone (Depade) 50 mg tablet 3/21/2025 Self   Sig: Take 1 tablet (50 mg) by mouth once daily.   pantoprazole (ProtoNix) 40 mg EC tablet 3/21/2025 Self   Sig: Take 1 tablet (40 mg) by mouth once daily in the morning. Take before meals.   pyridoxine (Vitamin B-6) 100 mg tablet 3/21/2025 Self   Sig: Take 1 tablet (100 mg) by mouth once daily.   rosuvastatin (Crestor) 40 mg tablet 3/21/2025 Self   Sig: TAKE 1 TABLET BY MOUTH ONCE DAILY   thiamine 100 mg tablet 3/21/2025 Self   Sig: Take 1 tablet (100 mg) by mouth once daily.      Facility-Administered Medications: None        The list below reflects the updated allergy list. Please review each documented allergy for additional clarification and justification.  Allergies  Reviewed by Eulaloi Eng on 3/22/2025        Severity Reactions Comments    Ethinyl Estradiol Not Specified Unknown Other reaction(s): Unknown    Ethynodiol Diac-eth Estradiol Not Specified Unknown     Meperidine Not Specified Unknown, Hives Other reaction(s): Convulsions            Patient declines M2B at discharge.     Sources:   [facility]  Pharmacy dispense history  Patient Interview Moderate historian  Chart Review  11/7/24 [facility] Office Visit - Internal Medicine - Aris Joseph      Additional Comments:  none      EULALIO GRUBBS  "BRISEIDA  Pharmacy Technician  03/22/25     Secure Chat preferred   If no response call [phone] or [email or web address] \"Med Rec\"    "

## 2025-03-23 VITALS
HEIGHT: 62 IN | BODY MASS INDEX: 19.32 KG/M2 | SYSTOLIC BLOOD PRESSURE: 106 MMHG | HEART RATE: 72 BPM | TEMPERATURE: 97 F | OXYGEN SATURATION: 99 % | WEIGHT: 105 LBS | DIASTOLIC BLOOD PRESSURE: 58 MMHG | RESPIRATION RATE: 18 BRPM

## 2025-03-23 LAB
ALBUMIN SERPL BCP-MCNC: 3 G/DL (ref 3.4–5)
ANION GAP SERPL CALC-SCNC: 12 MMOL/L (ref 10–20)
BASOPHILS # BLD AUTO: 0.02 X10*3/UL (ref 0–0.1)
BASOPHILS NFR BLD AUTO: 0.6 %
BUN SERPL-MCNC: 8 MG/DL (ref 6–23)
C TRACH RRNA SPEC QL NAA+PROBE: NEGATIVE
CALCIUM SERPL-MCNC: 8.5 MG/DL (ref 8.6–10.6)
CHLORIDE SERPL-SCNC: 102 MMOL/L (ref 98–107)
CO2 SERPL-SCNC: 22 MMOL/L (ref 21–32)
CREAT SERPL-MCNC: 1.15 MG/DL (ref 0.5–1.05)
EGFRCR SERPLBLD CKD-EPI 2021: 58 ML/MIN/1.73M*2
EOSINOPHIL # BLD AUTO: 0.06 X10*3/UL (ref 0–0.7)
EOSINOPHIL NFR BLD AUTO: 1.7 %
ERYTHROCYTE [DISTWIDTH] IN BLOOD BY AUTOMATED COUNT: 12.2 % (ref 11.5–14.5)
GLUCOSE SERPL-MCNC: 121 MG/DL (ref 74–99)
HCT VFR BLD AUTO: 25.5 % (ref 36–46)
HGB BLD-MCNC: 9.4 G/DL (ref 12–16)
IMM GRANULOCYTES # BLD AUTO: 0.02 X10*3/UL (ref 0–0.7)
IMM GRANULOCYTES NFR BLD AUTO: 0.6 % (ref 0–0.9)
LYMPHOCYTES # BLD AUTO: 1.13 X10*3/UL (ref 1.2–4.8)
LYMPHOCYTES NFR BLD AUTO: 31.9 %
MCH RBC QN AUTO: 32.6 PG (ref 26–34)
MCHC RBC AUTO-ENTMCNC: 36.9 G/DL (ref 32–36)
MCV RBC AUTO: 89 FL (ref 80–100)
MONOCYTES # BLD AUTO: 0.46 X10*3/UL (ref 0.1–1)
MONOCYTES NFR BLD AUTO: 13 %
N GONORRHOEA DNA SPEC QL PROBE+SIG AMP: NEGATIVE
NEUTROPHILS # BLD AUTO: 1.85 X10*3/UL (ref 1.2–7.7)
NEUTROPHILS NFR BLD AUTO: 52.2 %
NRBC BLD-RTO: 0 /100 WBCS (ref 0–0)
PHOSPHATE SERPL-MCNC: 1.7 MG/DL (ref 2.5–4.9)
PLATELET # BLD AUTO: 84 X10*3/UL (ref 150–450)
POTASSIUM SERPL-SCNC: 3.6 MMOL/L (ref 3.5–5.3)
RBC # BLD AUTO: 2.88 X10*6/UL (ref 4–5.2)
SODIUM SERPL-SCNC: 132 MMOL/L (ref 136–145)
T VAGINALIS RRNA SPEC QL NAA+PROBE: NEGATIVE
WBC # BLD AUTO: 3.5 X10*3/UL (ref 4.4–11.3)

## 2025-03-23 PROCEDURE — 2500000002 HC RX 250 W HCPCS SELF ADMINISTERED DRUGS (ALT 637 FOR MEDICARE OP, ALT 636 FOR OP/ED): Mod: SE | Performed by: INTERNAL MEDICINE

## 2025-03-23 PROCEDURE — 85025 COMPLETE CBC W/AUTO DIFF WBC: CPT | Performed by: STUDENT IN AN ORGANIZED HEALTH CARE EDUCATION/TRAINING PROGRAM

## 2025-03-23 PROCEDURE — 99239 HOSP IP/OBS DSCHRG MGMT >30: CPT | Performed by: INTERNAL MEDICINE

## 2025-03-23 PROCEDURE — 2500000004 HC RX 250 GENERAL PHARMACY W/ HCPCS (ALT 636 FOR OP/ED): Mod: SE | Performed by: NURSE PRACTITIONER

## 2025-03-23 PROCEDURE — 2500000004 HC RX 250 GENERAL PHARMACY W/ HCPCS (ALT 636 FOR OP/ED): Mod: SE | Performed by: STUDENT IN AN ORGANIZED HEALTH CARE EDUCATION/TRAINING PROGRAM

## 2025-03-23 PROCEDURE — 2500000001 HC RX 250 WO HCPCS SELF ADMINISTERED DRUGS (ALT 637 FOR MEDICARE OP): Mod: SE | Performed by: INTERNAL MEDICINE

## 2025-03-23 PROCEDURE — 36415 COLL VENOUS BLD VENIPUNCTURE: CPT | Performed by: STUDENT IN AN ORGANIZED HEALTH CARE EDUCATION/TRAINING PROGRAM

## 2025-03-23 PROCEDURE — 2500000004 HC RX 250 GENERAL PHARMACY W/ HCPCS (ALT 636 FOR OP/ED): Mod: SE | Performed by: INTERNAL MEDICINE

## 2025-03-23 PROCEDURE — 2500000001 HC RX 250 WO HCPCS SELF ADMINISTERED DRUGS (ALT 637 FOR MEDICARE OP): Mod: SE | Performed by: NURSE PRACTITIONER

## 2025-03-23 PROCEDURE — G0378 HOSPITAL OBSERVATION PER HR: HCPCS

## 2025-03-23 PROCEDURE — 84100 ASSAY OF PHOSPHORUS: CPT | Performed by: STUDENT IN AN ORGANIZED HEALTH CARE EDUCATION/TRAINING PROGRAM

## 2025-03-23 RX ORDER — LANOLIN ALCOHOL/MO/W.PET/CERES
100 CREAM (GRAM) TOPICAL
Qty: 30 TABLET | Refills: 0 | Status: SHIPPED | OUTPATIENT
Start: 2025-03-23 | End: 2025-04-22

## 2025-03-23 RX ORDER — METOPROLOL SUCCINATE 25 MG/1
25 TABLET, EXTENDED RELEASE ORAL DAILY
Status: DISCONTINUED | OUTPATIENT
Start: 2025-03-24 | End: 2025-03-23 | Stop reason: HOSPADM

## 2025-03-23 RX ORDER — METOPROLOL SUCCINATE 25 MG/1
25 TABLET, EXTENDED RELEASE ORAL DAILY
Qty: 30 TABLET | Refills: 0 | Status: SHIPPED | OUTPATIENT
Start: 2025-03-24 | End: 2025-04-23

## 2025-03-23 RX ORDER — SULFAMETHOXAZOLE AND TRIMETHOPRIM 800; 160 MG/1; MG/1
1 TABLET ORAL EVERY 12 HOURS SCHEDULED
Qty: 12 TABLET | Refills: 0 | Status: SHIPPED | OUTPATIENT
Start: 2025-03-23 | End: 2025-03-29

## 2025-03-23 RX ORDER — ACETAMINOPHEN 500 MG
1000 TABLET ORAL EVERY 6 HOURS PRN
Qty: 30 TABLET | Refills: 0 | Status: SHIPPED | OUTPATIENT
Start: 2025-03-23 | End: 2025-04-22

## 2025-03-23 RX ORDER — PANCRELIPASE 60000; 12000; 38000 [USP'U]/1; [USP'U]/1; [USP'U]/1
1 CAPSULE, DELAYED RELEASE PELLETS ORAL
Qty: 90 CAPSULE | Refills: 0 | Status: SHIPPED | OUTPATIENT
Start: 2025-03-23 | End: 2025-04-22

## 2025-03-23 RX ORDER — SODIUM CHLORIDE, SODIUM LACTATE, POTASSIUM CHLORIDE, CALCIUM CHLORIDE 600; 310; 30; 20 MG/100ML; MG/100ML; MG/100ML; MG/100ML
100 INJECTION, SOLUTION INTRAVENOUS CONTINUOUS
Status: DISCONTINUED | OUTPATIENT
Start: 2025-03-23 | End: 2025-03-23 | Stop reason: HOSPADM

## 2025-03-23 RX ORDER — POLYETHYLENE GLYCOL 3350 17 G/17G
17 POWDER, FOR SOLUTION ORAL EVERY 12 HOURS PRN
Qty: 1020 G | Refills: 0 | Status: SHIPPED | OUTPATIENT
Start: 2025-03-23

## 2025-03-23 RX ORDER — FAMOTIDINE 20 MG/1
20 TABLET, FILM COATED ORAL DAILY
Qty: 30 TABLET | Refills: 0 | Status: SHIPPED | OUTPATIENT
Start: 2025-03-23 | End: 2025-04-22

## 2025-03-23 RX ADMIN — FOLIC ACID 1 MG: 1 TABLET ORAL at 08:52

## 2025-03-23 RX ADMIN — SENNOSIDES AND DOCUSATE SODIUM 2 TABLET: 50; 8.6 TABLET ORAL at 08:51

## 2025-03-23 RX ADMIN — Medication 100 MG: at 08:51

## 2025-03-23 RX ADMIN — Medication 1 TABLET: at 08:52

## 2025-03-23 RX ADMIN — KETOROLAC TROMETHAMINE 15 MG: 15 INJECTION, SOLUTION INTRAMUSCULAR; INTRAVENOUS at 05:58

## 2025-03-23 RX ADMIN — SODIUM CHLORIDE, SODIUM LACTATE, POTASSIUM CHLORIDE, AND CALCIUM CHLORIDE 100 ML/HR: .6; .31; .03; .02 INJECTION, SOLUTION INTRAVENOUS at 11:44

## 2025-03-23 RX ADMIN — LEVETIRACETAM 1000 MG: 500 TABLET, FILM COATED ORAL at 08:52

## 2025-03-23 RX ADMIN — PANCRELIPASE 1 CAPSULE: 60000; 12000; 38000 CAPSULE, DELAYED RELEASE PELLETS ORAL at 08:52

## 2025-03-23 RX ADMIN — MAGNESIUM OXIDE TAB 400 MG (241.3 MG ELEMENTAL MG) 400 MG: 400 (241.3 MG) TAB at 08:52

## 2025-03-23 RX ADMIN — GABAPENTIN 300 MG: 300 CAPSULE ORAL at 08:51

## 2025-03-23 RX ADMIN — PANTOPRAZOLE SODIUM 40 MG: 40 TABLET, DELAYED RELEASE ORAL at 08:53

## 2025-03-23 RX ADMIN — CHOLECALCIFEROL TAB 25 MCG (1000 UNIT) 2000 UNITS: 25 TAB at 08:51

## 2025-03-23 RX ADMIN — SODIUM BICARBONATE 650 MG: 650 TABLET ORAL at 08:51

## 2025-03-23 RX ADMIN — SODIUM CHLORIDE 1000 ML: 0.9 INJECTION, SOLUTION INTRAVENOUS at 09:20

## 2025-03-23 RX ADMIN — POLYETHYLENE GLYCOL 3350 17 G: 17 POWDER, FOR SOLUTION ORAL at 08:50

## 2025-03-23 RX ADMIN — POTASSIUM CHLORIDE 40 MEQ: 1.5 POWDER, FOR SOLUTION ORAL at 08:50

## 2025-03-23 RX ADMIN — ASPIRIN 81 MG: 81 TABLET, CHEWABLE ORAL at 08:52

## 2025-03-23 RX ADMIN — SULFAMETHOXAZOLE AND TRIMETHOPRIM 1 TABLET: 800; 160 TABLET ORAL at 08:52

## 2025-03-23 RX ADMIN — THIAMINE HCL TAB 100 MG 100 MG: 100 TAB at 08:52

## 2025-03-23 ASSESSMENT — PAIN SCALES - PAIN ASSESSMENT IN ADVANCED DEMENTIA (PAINAD)
BREATHING: NORMAL
CONSOLABILITY: NO NEED TO CONSOLE
TOTALSCORE: MEDICATION (SEE MAR)

## 2025-03-23 ASSESSMENT — PAIN SCALES - GENERAL
PAINLEVEL_OUTOF10: 0 - NO PAIN
PAINLEVEL_OUTOF10: 0 - NO PAIN
PAINLEVEL_OUTOF10: 8

## 2025-03-23 ASSESSMENT — ACTIVITIES OF DAILY LIVING (ADL): LACK_OF_TRANSPORTATION: YES

## 2025-03-23 ASSESSMENT — PAIN DESCRIPTION - ORIENTATION: ORIENTATION: MID

## 2025-03-23 ASSESSMENT — PAIN DESCRIPTION - LOCATION: LOCATION: ABDOMEN

## 2025-03-23 NOTE — PROGRESS NOTES
Shi Trevino is a 49 y.o. female on day 1 of admission presenting with Chronic pancreatitis, unspecified pancreatitis type (Multi).    Past Medical History:   Diagnosis Date    Alcohol dependence, in remission     Alcohol dependence in remission    Alcoholic ketoacidosis 09/14/2020    Bacterial vaginosis 02/26/2023    Encounter for follow-up examination after completed treatment for conditions other than malignant neoplasm 01/24/2019    Surgery follow-up    Encounter for screening for infections with a predominantly sexual mode of transmission 07/02/2021    Screen for STD (sexually transmitted disease)    Encounter for screening for malignant neoplasm of cervix 07/02/2021    Cervical cancer screening    Epilepsy, unspecified, not intractable, without status epilepticus     Epileptic    Metabolic acidosis, normal anion gap (NAG) 10/09/2020    Personal history of diseases of the blood and blood-forming organs and certain disorders involving the immune mechanism 05/26/2021    History of anemia    Personal history of other diseases of the circulatory system     History of cardiomyopathy    Personal history of other diseases of the digestive system     History of chronic pancreatitis    Personal history of other diseases of the female genital tract 07/02/2021    History of vaginal pruritus    Personal history of other mental and behavioral disorders     History of post traumatic stress disorder    Personal history of other specified conditions     History of diarrhea    Personal history of other specified conditions 08/06/2020    History of itching    Personal history of other specified conditions 07/13/2017    History of fatigue    Rhabdomyolysis 04/14/2016    Formatting of this note might be different from the original. - CK >7000 on admission likely secondary to volume depletion vs. Withdrawal vs. Intoxication - Contributing to AINSLEY - Fluid resuscitation, will continue mIVF - Trend CK Last Assessment & Plan: Formatting  of this note might be different from the original. In the setting of proximal muscle weakness, difficulty maintaining upgaze. Baseline     Tremor, unspecified 05/30/2017    Spells of trembling    Unspecified protein-calorie malnutrition (Multi) 01/03/2019    Malnutrition     Past Surgical History:   Procedure Laterality Date    CT ABDOMEN PELVIS ANGIOGRAM W AND/OR WO IV CONTRAST  1/7/2019    CT ABDOMEN PELVIS ANGIOGRAM W AND/OR WO IV CONTRAST 1/7/2019 [facility]ICAL [facility]GAC[facility]    CT ABDOMEN PELVIS ANGIOGRAM W AND/OR WO IV CONTRAST  1/11/2019    CT ABDOMEN PELVIS ANGIOGRAM W AND/OR WO IV CONTRAST 1/11/2019 [facility] CL[facility]ICAL LEGAC[facility]    CT ABDOMEN PELVIS ANGIOGRAM W AND/OR WO IV CONTRAST  2/2/2019    CT ABDOMEN PELVIS ANGIOGRAM W AND/OR WO IV CONTRAST 2/2/2019 [facility] CL[facility] LEGAC[facility]    CT ABDOMEN PELVIS ANGIOGRAM W AND/OR WO IV CONTRAST  10/22/2018    CT ABDOMEN PELVIS ANGIOGRAM W AND/OR WO IV CONTRAST 10/22/2018 [facility] CL[facility]ICAL LEGAC[facility]    IR ANGIOGRAM INFERIOR EPIGASTRIC  1/7/2019    IR ANGIOGRAM INFERIOR EPIGASTRIC 1/7/2019 [facility] CL[facility]ICAL [facility]GAC[facility]    IR ANGIOGRAM INFERIOR EPIGASTRIC PELVIC  1/7/2019    IR ANGIOGRAM INFERIOR EPIGASTRIC PELVIC 1/7/2019 [facility]GAC[facility]    MR HEAD ANGIO WO IV CONTRAST  10/15/2018    MR HEAD ANGIO WO IV CONTRAST 10/15/2018 [facility]GAC[facility]    MR NECK ANGIO WO IV CONTRAST  10/15/2018    MR NECK ANGIO WO IV CONTRAST 10/15/2018 [facility]    OTHER SURGICAL HISTORY  12/20/2018    Cardiac catheterization    OTHER SURGICAL HISTORY  08/06/2020    Laparoscopy    OTHER SURGICAL HISTORY  08/06/2020    Hernia repair     Social History     Socioeconomic History    Marital status: Single     Spouse name: Not on file    Number of children: Not on file    Years of education: Not on file    Highest education level: Not on file   Occupational History    Not on file   Tobacco Use    Smoking status: Every Day     Current packs/day: 0.25     Average packs/day: 0.3 packs/day for 17.2 years (4.3 ttl pk-yrs)      Types: Cigarettes     Start date: 1/1/2008    Smokeless tobacco: Never   Vaping Use    Vaping status: Never Used   Substance and Sexual Activity    Alcohol use: Yes     Comment: socially    Drug use: Yes     Types: Marijuana    Sexual activity: Defer   Other Topics Concern    Not on file   Social History Narrative    Not on file     Social Drivers of Health     Financial Resource Strain: Low Risk  (12/28/2023)    Overall Financial Resource Strain (CARDIA)     Difficulty of Paying Living Expenses: Not very hard   Food Insecurity: Not on File (9/26/2024)    Received from [facility]    Food Insecurity     Food: 0   Transportation Needs: No Transportation Needs (9/7/2024)    Received from [facility]    PRAPARE - Transportation     Lack of Transportation (Medical): No     Lack of Transportation (Non-Medical): No   Physical Activity: Not on File (7/18/2022)    [facility]    Physical Activity     Physical Activity: 0   Stress: Not on File (9/13/2022)    Received from [facility]    Stress     Stress: 0   Social Connections: Not on File (9/13/2022)    Received from [facility]    Social Connections     Connectedness: 0   Recent Concern: Social Connections - At Risk (9/13/2022)    Received from [facility]    Social Connections     Social Connections and Isolation: 2   Intimate Partner Violence: Not on file   Housing Stability: Low Risk  (9/7/2024)    Received from [facility]    Housing Stability Vital Sign     Unable to Pay for Housing in the Last Year: No     Number of Times Moved in the Last Year: 1     Homeless in the Last Year: No     Allergies   Allergen Reactions    Ethinyl Estradiol Unknown     Other reaction(s): Unknown    Ethynodiol Diac-Eth Estradiol Unknown    Meperidine Unknown and Hives     Other reaction(s): Convulsions       Dietary Orders (From admission, onward)               Adult diet Regular  Diet effective now        Question:  Diet type  Answer:  Regular                      Objective      Vitals  Temperature:  [36.1 °C (97 °F)-36.4 °C (97.5 °F)] 36.1 °C (97 °F)  Heart Rate:  [72-91] 87  Respirations:  [16-18] 18  BP: ()/(33-66) 88/52       0-10 (Numeric) Pain Score: 0 - No pain         Peripheral IV 03/22/25 22 G Left Hand (Active)   Number of days: 0       Vent Settings     No intake or output data in the 24 hours ending 03/23/25 0941    Relevant Results  Results for orders placed or performed during the hospital encounter of 03/21/25 (from the past 24 hours)   ECG 12 Lead   Result Value Ref Range    Ventricular Rate 81 BPM    Atrial Rate 81 BPM    MT Interval 186 ms    QRS Duration 86 ms    QT Interval 386 ms    QTC Calculation(Bazett) 448 ms    P Axis 48 degrees    R Axis 5 degrees    T Axis 38 degrees    QRS Count 13 beats    Q Onset 221 ms    P Onset 128 ms    P Offset 175 ms    T Offset 414 ms    QTC Fredericia 426 ms   Renal Function Panel   Result Value Ref Range    Glucose 121 (H) 74 - 99 mg/dL    Sodium 132 (L) 136 - 145 mmol/L    Potassium 3.6 3.5 - 5.3 mmol/L    Chloride 102 98 - 107 mmol/L    Bicarbonate 22 21 - 32 mmol/L    Anion Gap 12 10 - 20 mmol/L    Urea Nitrogen 8 6 - 23 mg/dL    Creatinine 1.15 (H) 0.50 - 1.05 mg/dL    eGFR 58 (L) >60 mL/min/1.73m*2    Calcium 8.5 (L) 8.6 - 10.6 mg/dL    Phosphorus 1.7 (L) 2.5 - 4.9 mg/dL    Albumin 3.0 (L) 3.4 - 5.0 g/dL   CBC and Auto Differential   Result Value Ref Range    WBC 3.5 (L) 4.4 - 11.3 x10*3/uL    nRBC 0.0 0.0 - 0.0 /100 WBCs    RBC 2.88 (L) 4.00 - 5.20 x10*6/uL    Hemoglobin 9.4 (L) 12.0 - 16.0 g/dL    Hematocrit 25.5 (L) 36.0 - 46.0 %    MCV 89 80 - 100 fL    MCH 32.6 26.0 - 34.0 pg    MCHC 36.9 (H) 32.0 - 36.0 g/dL    RDW 12.2 11.5 - 14.5 %    Platelets 84 (L) 150 - 450 x10*3/uL    Neutrophils % 52.2 40.0 - 80.0 %    Immature Granulocytes %, Automated 0.6 0.0 - 0.9 %    Lymphocytes % 31.9 13.0 - 44.0 %    Monocytes % 13.0 2.0 - 10.0 %    Eosinophils % 1.7 0.0 - 6.0 %    Basophils % 0.6 0.0 - 2.0 %    Neutrophils  Absolute 1.85 1.20 - 7.70 x10*3/uL    Immature Granulocytes Absolute, Automated 0.02 0.00 - 0.70 x10*3/uL    Lymphocytes Absolute 1.13 (L) 1.20 - 4.80 x10*3/uL    Monocytes Absolute 0.46 0.10 - 1.00 x10*3/uL    Eosinophils Absolute 0.06 0.00 - 0.70 x10*3/uL    Basophils Absolute 0.02 0.00 - 0.10 x10*3/uL     Scheduled medications  [Held by provider] amLODIPine, 5 mg, oral, Daily  aspirin, 81 mg, oral, Daily  cholecalciferol, 2,000 Units, oral, Daily  [Held by provider] enoxaparin, 40 mg, subcutaneous, q24h  folic acid, 1 mg, oral, Daily  gabapentin, 300 mg, oral, Daily  levETIRAcetam, 1,000 mg, oral, BID  magnesium oxide, 400 mg, oral, Daily  [Held by provider] metoprolol succinate XL, 50 mg, oral, Daily  mirtazapine, 7.5 mg, oral, Nightly  multivitamin with minerals, 1 tablet, oral, Daily  pancrelipase (Lip-Prot-Amyl), 1 capsule, oral, TID  pantoprazole, 40 mg, oral, Daily  polyethylene glycol, 17 g, oral, Daily  pyridoxine, 100 mg, oral, Daily  rosuvastatin, 40 mg, oral, Nightly  sennosides-docusate sodium, 2 tablet, oral, BID  sodium bicarbonate, 650 mg, oral, BID  sodium chloride, 1,000 mL, intravenous, Once  sulfamethoxazole-trimethoprim, 1 tablet, oral, q12h ELIZABETH  thiamine, 100 mg, oral, Daily      Continuous medications       PRN medications  PRN medications: dicyclomine, ketorolac, melatonin, prochlorperazine             Assessment & Plan  Chronic pancreatitis, unspecified pancreatitis type (Multi)      Assessment/Plan          S/  Seen and examined  No new complaints no new event over the night   ROS: Negative    O/   General Appearance: Alert, Oriented X3, Cooperative, Not in Acute Distress  HEENT: no eye redness, not pale, no jaundice, Throat: not congested, no ulcers    Chest: Good AE bilateral, No crackles, no ronchies, no wheezes.   Heart: RHR, Normal heart sounds S1, S2, no murmur or gallop,   Abdomen: Soft, lax, no hepatosplenomegaly, intact hernia orifices, negative shaw sign, no tenderness,  "  Genitalia: no abnormal discharge, no ulcers, no swelling.  Lymphatics: no lymphadenopathy, supraclavicular, inguinal trochlear, or axilla.   Neurology: Intact cranial nerves 2 to 12, intact sensation, intact motor function (Power, tone and reflexes). Normal DTR reflexes.   Extremities: no signs of PAD, no swelling no ulcers   Back: no deformity, no SI tenderness, no ulcers.   Skin: no signs of dehydration, no Rash, Bruises, or purpura.      AS:    Ms. Shi Trevino is a 49 y.o. female with a PMH of HTN, thrombocytopenia, alcohol induced pancreatitis, portal vein thrombosis, bipolar disorder/PTSD, ischemic CVA of temporal lobe, seizure disorder, asthma, and smoker, admitted for Acute/chronic abdominal pain due to pancreatitis, with metabolic acidosis. Started on IVF, elyte replacement, Thiamin nad pain control no indication for aBX at this time. CT scan showed chronic pancreatitis no cyst no peroration on obstruction.   PT/OT consult.   Gyne rec Bactrim outpatient followup.   Continue monitoring elytes, BP is low Hold BP meds, advance diet as tolerated.     #. Acute/ Chronic alcohol induced pancreatitis  #. Abdominal pain acute/chronic  #. Metabolic acidosis, HAGMA due to alcohol \" AKA\".   #. Hypokalemia, Hyponatremia  - beta hydroxybutyrate 9.97, gap 29, bicarb 15, +2 ketones in urine on admit  - pt denies daily alcohol use and reports that she had 2 shots of vodka 2 days prior to ED presentation  - CT A/P with contrast on admit showed chronic pancreatitis and chronic gastritis/PUE  - Lipase 17 on admit,   - Compazine 5 mg IV push q6h PRN n/v  - clear liquid diet advance as tolerated to regular diet  - Toradol 15 mg q6h PRN moderate to severe pain (x2 days)  - c/w home dose of Creon 1 capsule TID with meals  - c/w home dose of Protonix 40 mg PO every day  - c/w home dose of Bentyl 10 mg PO QID PRN abdominal discomfort  - last colonoscopy done 9/11/24: One 12 mm polyp in the mid sigmoid colon, removed with a hot " snare. Resected and retrieved. Diverticulosis in the left colon. Non-bleeding internal hemorrhoids.  - last EGD done 5/17/23: Endosonographic imaging of the pancreas showed sonographic changes of severe chronic diffusely calcific pancreatitis. There was no stone in the common bile duct. No specimens collected.  - s/p D5 infusion at 150 ml/hr    - monitor pt for re-feeding syndrome  - start sodium bicarb 650 mg PO BID  - s/p potassium chloride 40 mEq PO x1 dose and 20 mEq IV x1 dose while in Ed  #. Vaginal Mass:  #. Bartholin's abscess (Ruptured)  - consult  Obs/gyne rec:   - Left Bartholin abscess ruptured with minimal pus expression on palpation  - STI testing: GC-CT, Trich ordered  - Bactrim BID for 7 days on discharge  - Tylenol, Ibuprofen for pain management  - Office visit with Gynecology to be scheduled by our team for follow up and routine Gynecology exam including screening.   - continue monitoring RFT while on bactrim   Prolonged QTC   - EKG on admit showed NSR with prolonged QTC of 483  - will replace e lyte and repeat.   - monitor on tele  - K>4 and Mg>2     #. Alcoholic liver disease  #. Thrombocytopenia   #. Transaminates  -  on admit-->  on 9/14/24  - Alk P 136, Ast 119, and ALT 51 on admit  - could be 2/2 alcohol use and dehydration   - continue to monitor   - liver Us     #. Unintentional weight loss  - likely due to alcoholic pancreatitis, malabsorption   - nutrition consult placed   - wt on admit: 105 lb--> wt 125 lbs 11/7/24--> wt 120 lbs 6/10/24--> wt 106 lbs 12/27/23  - CT scan no malignancy   - Liver US   - outpateint f/p with GI-hepatology and age appropriate screening for malignancy       HTN  - last /66--> BP 92/63 on arrival to ED  - continue to monitor BP  - holding home dose of Toprol XL 50 mg PO every day and Norvasc 5 mg PO every day d/t soft BP      Code Status: Full  DVT ppx:  enoxaparin   Disp: PT/ot     Spoke to my patient, Discussed the medical, social  conditions, the reason for this admission explained our plan and recommendations.  Time spent on the assessment of patient, gathering and interpreting data, review of medical record/patient history, personally reviewing radiographic imaging. With greater than 50% spent in personal discussion with patient.  Time > 55 min         Tone Ponce MD

## 2025-03-23 NOTE — PROGRESS NOTES
03/23/25 1251   Discharge Planning   Living Arrangements Alone  (Lives in apartment complex, 5 stairs down to enter home)   Support Systems Friends/neighbors   Assistance Needed PCP-  Bowel   Type of Residence Private residence  (Independent with all ADL's prior to admission, denies DME)   Number of Stairs to Enter Residence 5   Number of Stairs Within Residence 0   Do you have animals or pets at home? No   Who is requesting discharge planning? Patient   Home or Post Acute Services None   Expected Discharge Disposition Home   Financial Resource Strain   How hard is it for you to pay for the very basics like food, housing, medical care, and heating? Somewhat   Housing Stability   In the last 12 months, was there a time when you were not able to pay the mortgage or rent on time? N   At any time in the past 12 months, were you homeless or living in a shelter (including now)? N   Transportation Needs   In the past 12 months, has lack of transportation kept you from medical appointments or from getting medications? no   In the past 12 months, has lack of transportation kept you from meetings, work, or from getting things needed for daily living? Yes   Patient Choice   Provider Choice list and CMS website (https://medicare.gov/care-compare#search) for post-acute Quality and Resource Measure Data were provided and reviewed with: Patient   Patient / Family choosing to utilize agency / facility established prior to hospitalization No   Stroke Family Assessment   Stroke Family Assessment Needed No   Intensity of Service   Intensity of Service 0-30 min     Shi Trevino is a 49 y.o. female on day 1 of admission presenting with Chronic pancreatitis, unspecified pancreatitis type (Multi).    Patient lives alone in apartment complex.  PCP. One hospitalization within the last year at [facility]. Stated she needs a  to help her get back into school. She presents as dissatisfied with admission at time of assessment,  food, care and time spent in the ED. Pending RNF. Denies DME. Independent with all ADL's prior to admission.

## 2025-03-23 NOTE — DISCHARGE SUMMARY
Discharge Diagnosis  Chronic pancreatitis, unspecified pancreatitis type (Multi)    Issues Requiring Follow-Up  PCP  Obs and gyne   f/p with hepatology     Discharge Meds     Medication List      START taking these medications     polyethylene glycol 17 gram packet; Commonly known as: Glycolax,   Miralax; Take 17 g by mouth every 12 hours if needed (constipation).   sulfamethoxazole-trimethoprim 800-160 mg tablet; Commonly known as:   Bactrim DS; Take 1 tablet by mouth every 12 hours for 12 doses.     CHANGE how you take these medications     acetaminophen 500 mg tablet; Commonly known as: Tylenol; Take 2 tablets   (1,000 mg) by mouth every 6 hours if needed for mild pain (1 - 3) or   moderate pain (4 - 6).; What changed: reasons to take this   famotidine 20 mg tablet; Commonly known as: Pepcid; Take 1 tablet (20   mg) by mouth once daily.; What changed: See the new instructions.   metoprolol succinate XL 25 mg 24 hr tablet; Commonly known as:   Toprol-XL; Take 1 tablet (25 mg) by mouth once daily. Do not crush or   chew. Hold if low BP; Start taking on: March 24, 2025; What changed:   medication strength, how much to take, additional instructions     CONTINUE taking these medications     albuterol 90 mcg/actuation inhaler; Inhale 1 puff every 4 hours if   needed for wheezing.   aspirin 81 mg chewable tablet; Chew 1 tablet (81 mg) once daily. CHEW   AND SWALLOW   cholecalciferol 50 MCG (2000 UT) tablet; Commonly known as: Vitamin D-3   Creon 12,000-38,000 -60,000 unit capsule; Generic drug: pancrelipase   (Lip-Prot-Amyl); Take 1 capsule by mouth 3 times daily (morning, midday,   late afternoon).   cyanocobalamin 1,000 mcg tablet; Commonly known as: Vitamin B-12; Take 1   tablet (1,000 mcg) by mouth once daily. as directed   diclofenac sodium 1 % gel; Commonly known as: Voltaren   dicyclomine 10 mg capsule; Commonly known as: Bentyl; TAKE 1 CAPSULE BY   MOUTH FOUR TIMES DAILY AS NEEDED FOR ABDOMINAL PAIN OR CRAMPS    docusate sodium 100 mg capsule; Commonly known as: Colace; TAKE 1   CAPSULE BY MOUTH TWICE DAILY AS NEEDED FOR CONSTIPATION   folic acid 1 mg tablet; Commonly known as: Folvite   gabapentin 300 mg capsule; Commonly known as: Neurontin; Take 1 capsule   (300 mg) by mouth once daily.   guaiFENesin 600 mg 12 hr tablet; Commonly known as: Mucinex; TAKE 2   TABLETS BY MOUTH TWICE DAILY AS NEEDED FOR COUGH. DO NOT CRUSH, CHEW OR   SPLIT   levETIRAcetam 1,000 mg tablet; Commonly known as: Keppra; Take 1 tablet   (1,000 mg) by mouth 2 times a day.   melatonin 3 mg tablet; TAKE 1 TABLET BY MOUTH DAILY AT BEDTIME   mirtazapine 7.5 mg tablet; Commonly known as: Remeron; Take 1 tablet   (7.5 mg) by mouth once daily at bedtime.   multivitamin tablet   naltrexone 50 mg tablet; Commonly known as: Depade; Take 1 tablet (50   mg) by mouth once daily.   pantoprazole 40 mg EC tablet; Commonly known as: ProtoNix; Take 1 tablet   (40 mg) by mouth once daily in the morning. Take before meals.   pyridoxine 100 mg tablet; Commonly known as: Vitamin B-6; Take 1 tablet   (100 mg) by mouth once daily.   rosuvastatin 40 mg tablet; Commonly known as: Crestor; TAKE 1 TABLET BY   MOUTH ONCE DAILY   thiamine 100 mg tablet; Commonly known as: Vitamin B-1; Take 1 tablet   (100 mg) by mouth once daily.     STOP taking these medications     amLODIPine 5 mg tablet; Commonly known as: Norvasc       Test Results Pending At Discharge  Pending Labs       No current pending labs.            Hospital Course   General Appearance: Alert, Oriented X3, Cooperative, Not in Acute Distress  HEENT: no eye redness, not pale, no jaundice, Throat: not congested, no ulcers    Chest: Good AE bilateral, No crackles, no ronchies, no wheezes.   Heart: RHR, Normal heart sounds S1, S2, no murmur or gallop,   Abdomen: Soft, lax, no hepatosplenomegaly, intact hernia orifices, negative shaw sign, no tenderness,   Genitalia: no abnormal discharge, no ulcers, no  "swelling.  Lymphatics: no lymphadenopathy, supraclavicular, inguinal trochlear, or axilla.   Neurology: Intact cranial nerves 2 to 12, intact sensation, intact motor function (Power, tone and reflexes). Normal DTR reflexes.   Extremities: no signs of PAD, no swelling no ulcers   Back: no deformity, no SI tenderness, no ulcers.   Skin: no signs of dehydration, no Rash, Bruises, or purpura.        AS:     Ms. Shi Trevino is a 49 y.o. female with a PMH of HTN, thrombocytopenia, alcohol induced pancreatitis, portal vein thrombosis, bipolar disorder/PTSD, ischemic CVA of temporal lobe, seizure disorder, asthma, and smoker, admitted for Acute/chronic abdominal pain due to pancreatitis, with metabolic acidosis. Started on IVF, elyte replacement, Thiamin nad pain control no indication for aBX at this time. CT scan showed chronic pancreatitis no cyst no peroration on obstruction.   PT/OT consult.   Gyne rec Bactrim outpatient followup.   Continue monitoring elytes, BP is low Hold BP meds, advance diet as tolerated.   She tolerated oral diet, denied N/V, asymptomatic.   Medically stable will dc home.     #. Acute/ Chronic alcohol induced pancreatitis: improving, pain under control   #. Abdominal pain acute/chronic  #. Metabolic acidosis, HAGMA due to alcohol \" AKA\".  Improved   #. Hypokalemia, Hyponatremia: replaced   - beta hydroxybutyrate 9.97, gap 29, bicarb 15, +2 ketones in urine on admit  - pt denies daily alcohol use and reports that she had 2 shots of vodka 2 days prior to ED presentation  - CT A/P with contrast on admit showed chronic pancreatitis and chronic gastritis/PUE  - Lipase 17 on admit,   - Compazine 5 mg IV push q6h PRN n/v  - clear liquid diet advance as tolerated to regular diet  - Toradol 15 mg q6h PRN moderate to severe pain (x2 days)  - c/w home dose of Creon 1 capsule TID with meals  - c/w home dose of Protonix 40 mg PO every day  - c/w home dose of Bentyl 10 mg PO QID PRN abdominal discomfort  - " last colonoscopy done 9/11/24: One 12 mm polyp in the mid sigmoid colon, removed with a hot snare. Resected and retrieved. Diverticulosis in the left colon. Non-bleeding internal hemorrhoids.  - last EGD done 5/17/23: Endosonographic imaging of the pancreas showed sonographic changes of severe chronic diffusely calcific pancreatitis. There was no stone in the common bile duct. No specimens collected.  - s/p D5 infusion at 150 ml/hr    - monitor pt for re-feeding syndrome  - s/p potassium chloride 40 mEq PO x1 dose and 20 mEq IV x1 dose while in Ed  #. Vaginal Mass:  #. Bartholin's abscess (Ruptured)  - consult  Obs/gyne rec:   - Left Bartholin abscess ruptured with minimal pus expression on palpation  - STI testing: GC-CT, Trich ordered  - Bactrim BID for 7 days on discharge  - Tylenol, Ibuprofen for pain management  - Office visit with Gynecology to be scheduled by our team for follow up and routine Gynecology exam including screening.   - continue monitoring RFT while on bactrim   Prolonged QTC   - EKG on admit showed NSR with prolonged QTC of 483  - will replace e lyte and repeat.   - monitor on tele  - K>4 and Mg>2     #. Alcoholic liver disease  #. Thrombocytopenia   #. Transaminates  -  on admit-->  on 9/14/24  - Alk P 136, Ast 119, and ALT 51 on admit  - could be 2/2 alcohol use and dehydration   - continue to monitor   - liver Us   - close f/p with hepatology outpatient      #. Unintentional weight loss  - likely due to alcoholic pancreatitis, malabsorption   - nutrition consult placed   - wt on admit: 105 lb--> wt 125 lbs 11/7/24--> wt 120 lbs 6/10/24--> wt 106 lbs 12/27/23  - CT scan no malignancy   - Liver US   - outpateint f/p with GI-hepatology and age appropriate screening for malignancy       HTN  - last /66--> BP 92/63 on arrival to ED  - continue to monitor BP  - holding home dose of Toprol XL 50 mg PO every day and Norvasc 5 mg PO every day d/t soft BP      Code Status:  Full  DVT ppx:  enoxaparin   Disp: PT/ot      Spoke to my patient, Discussed the medical, social conditions, the reason for this admission explained our plan and recommendations.  Time spent on the assessment of patient, gathering and interpreting data, review of medical record/patient history, personally reviewing radiographic imaging. With greater than 50% spent in personal discussion with patient.  Time > 40  min     Pertinent Physical Exam At Time of Discharge  Physical Exam    Outpatient Follow-Up  Future Appointments   Date Time Provider Department Center   4/8/2025  3:30 PM Duke Pulido MD [id number] None   6/3/2025  3:15 PM Jennifer Osman MD [id number] Academic   8/6/2025  3:30 PM Pennie Glover MD [id number] Academic         Tone Ponce MD

## 2025-03-26 ENCOUNTER — PATIENT OUTREACH (OUTPATIENT)
Dept: CARE COORDINATION | Facility: CLINIC | Age: 50
End: 2025-03-26
Payer: COMMERCIAL

## 2025-03-26 NOTE — PROGRESS NOTES
Outreach call to patient to support a smooth transition of care from recent admission.  No answer.  Divina Harris RN [facility] [place]  [phone]

## 2025-04-03 DIAGNOSIS — K58.8 OTHER IRRITABLE BOWEL SYNDROME: ICD-10-CM

## 2025-04-07 RX ORDER — DICYCLOMINE HYDROCHLORIDE 10 MG/1
CAPSULE ORAL
Qty: 30 CAPSULE | Refills: 10 | Status: SHIPPED | OUTPATIENT
Start: 2025-04-07

## 2025-04-08 ENCOUNTER — PATIENT OUTREACH (OUTPATIENT)
Dept: CARE COORDINATION | Facility: CLINIC | Age: 50
End: 2025-04-08
Payer: COMMERCIAL

## 2025-04-08 NOTE — PROGRESS NOTES
Outreach call to patient following up on appointment with primary care provider.  Discussed appointment, reviewed medications, and discussed plan of care.  Patient with no additional questions at this time.  Will continue to follow.  Divina Harris RN [facility] [place]  [phone]

## 2025-04-22 DIAGNOSIS — K86.1 CHRONIC PANCREATITIS, UNSPECIFIED PANCREATITIS TYPE (MULTI): ICD-10-CM

## 2025-04-22 DIAGNOSIS — E87.29 ALCOHOLIC KETOACIDOSIS: ICD-10-CM

## 2025-04-22 DIAGNOSIS — K86.0 ALCOHOL-INDUCED CHRONIC PANCREATITIS (MULTI): ICD-10-CM

## 2025-04-22 DIAGNOSIS — E63.9 NUTRITIONAL DEFICIENCY: Primary | ICD-10-CM

## 2025-04-22 DIAGNOSIS — N75.1 BARTHOLIN'S GLAND ABSCESS: ICD-10-CM

## 2025-04-22 NOTE — TELEPHONE ENCOUNTER
Roxanna of [facility] called requesting refill of pt multivitamin and acetaminophen 500mg. Orders pended and routed to provider.

## 2025-04-23 RX ORDER — MULTIVITAMIN
1 TABLET ORAL
Qty: 90 TABLET | Refills: 3 | Status: SHIPPED | OUTPATIENT
Start: 2025-04-23

## 2025-04-23 RX ORDER — ACETAMINOPHEN 500 MG
1000 TABLET ORAL EVERY 6 HOURS PRN
Qty: 30 TABLET | Refills: 0 | Status: SHIPPED | OUTPATIENT
Start: 2025-04-23 | End: 2025-05-23

## 2025-05-05 ENCOUNTER — PATIENT OUTREACH (OUTPATIENT)
Dept: CARE COORDINATION | Facility: CLINIC | Age: 50
End: 2025-05-05
Payer: COMMERCIAL

## 2025-05-05 NOTE — PROGRESS NOTES
Outreach call to patient to support a smooth transition of care from recent readmission.  Person that answered pt's phone stated that she has the pt's phone at this time.   Divina Harris RN [facility] [place]  [phone]

## 2025-05-13 ENCOUNTER — PATIENT OUTREACH (OUTPATIENT)
Dept: CARE COORDINATION | Facility: CLINIC | Age: 50
End: 2025-05-13
Payer: COMMERCIAL

## 2025-05-13 NOTE — PROGRESS NOTES
Outreach call to patient to check in 30 days after hospital discharge to support smooth transition of care.  Unable to leave ,  not set up.   Divina Harris RN [facility] [place]  [phone]

## 2025-06-03 ENCOUNTER — APPOINTMENT (OUTPATIENT)
Dept: OPHTHALMOLOGY | Facility: CLINIC | Age: 50
End: 2025-06-03
Payer: COMMERCIAL

## 2025-06-09 ENCOUNTER — APPOINTMENT (OUTPATIENT)
Dept: OPHTHALMOLOGY | Facility: CLINIC | Age: 50
End: 2025-06-09
Payer: COMMERCIAL

## 2025-06-10 ENCOUNTER — PATIENT OUTREACH (OUTPATIENT)
Dept: CARE COORDINATION | Facility: CLINIC | Age: 50
End: 2025-06-10
Payer: COMMERCIAL

## 2025-06-10 NOTE — PROGRESS NOTES
Outreach call to patient to support a smooth transition of care from recent admission. No answer.  Divina Harris RN [facility] [place]  [phone]    
Follow-up with Dr. Christine in ONE WEEK at [phone]

## 2025-06-11 ENCOUNTER — TELEPHONE (OUTPATIENT)
Facility: HOSPITAL | Age: 50
End: 2025-06-11
Payer: COMMERCIAL

## 2025-06-24 ENCOUNTER — PATIENT OUTREACH (OUTPATIENT)
Dept: CARE COORDINATION | Facility: CLINIC | Age: 50
End: 2025-06-24
Payer: COMMERCIAL

## 2025-06-24 NOTE — PROGRESS NOTES
Outreach call to patient following up on appointment with primary care provider.  No answer and vm not set up to leave messages.  Will continue to follow.  Divina Harris RN [facility] [place]  [phone]

## 2025-07-08 ENCOUNTER — PATIENT OUTREACH (OUTPATIENT)
Dept: CARE COORDINATION | Facility: CLINIC | Age: 50
End: 2025-07-08
Payer: COMMERCIAL

## 2025-07-08 NOTE — PROGRESS NOTES
Outreach call to patient to check in 30 days after hospital discharge to support smooth transition of care.  No answer.  Divina Harris RN [facility] [place]  [phone]

## 2025-08-01 DIAGNOSIS — K59.00 CONSTIPATION, UNSPECIFIED CONSTIPATION TYPE: ICD-10-CM

## 2025-08-01 DIAGNOSIS — F31.70 BIPOLAR DISORDER IN PARTIAL REMISSION, MOST RECENT EPISODE UNSPECIFIED TYPE (MULTI): ICD-10-CM

## 2025-08-06 ENCOUNTER — APPOINTMENT (OUTPATIENT)
Dept: DERMATOLOGY | Facility: CLINIC | Age: 50
End: 2025-08-06
Payer: COMMERCIAL

## 2025-08-06 DIAGNOSIS — L65.8 TRACTION ALOPECIA: ICD-10-CM

## 2025-08-06 DIAGNOSIS — L24.9 IRRITANT CONTACT DERMATITIS, UNSPECIFIED TRIGGER: Primary | ICD-10-CM

## 2025-08-06 DIAGNOSIS — L85.9 HYPERKERATOSIS: ICD-10-CM

## 2025-08-06 PROCEDURE — 99214 OFFICE O/P EST MOD 30 MIN: CPT | Performed by: STUDENT IN AN ORGANIZED HEALTH CARE EDUCATION/TRAINING PROGRAM

## 2025-08-06 RX ORDER — MINOXIDIL 2 %
SOLUTION, NON-ORAL TOPICAL 2 TIMES DAILY
Qty: 60 ML | Refills: 3 | Status: SHIPPED | OUTPATIENT
Start: 2025-08-06

## 2025-08-06 RX ORDER — DOCUSATE SODIUM 100 MG/1
100 CAPSULE, LIQUID FILLED ORAL 2 TIMES DAILY PRN
Qty: 60 CAPSULE | Refills: 11 | Status: SHIPPED | OUTPATIENT
Start: 2025-08-06

## 2025-08-06 RX ORDER — TRIAMCINOLONE ACETONIDE 0.25 MG/G
OINTMENT TOPICAL 2 TIMES DAILY
Qty: 15 G | Refills: 3 | Status: SHIPPED | OUTPATIENT
Start: 2025-08-06 | End: 2025-08-20

## 2025-08-06 RX ORDER — AMMONIUM LACTATE 12 G/100G
LOTION TOPICAL DAILY
Qty: 225 G | Refills: 3 | Status: SHIPPED | OUTPATIENT
Start: 2025-08-06 | End: 2026-08-06

## 2025-08-06 RX ORDER — TALC
3 POWDER (GRAM) TOPICAL NIGHTLY
Qty: 30 TABLET | Refills: 11 | Status: SHIPPED | OUTPATIENT
Start: 2025-08-06

## 2025-08-06 NOTE — Clinical Note
Discussed the inflammatory nature of this condition    START triamcinolone 0.025% twice daily for 2 weeks to buttocks and neck. Can repeat as needed for irritation.     Encouraged to continue using vaseline and moisturizer to these areas.

## 2025-08-06 NOTE — Clinical Note
Advised patient to keep her feet moisturized with vaseline  START ammonium lactate 12% daily for her feet

## 2025-08-06 NOTE — Clinical Note
The nature of this scarring alopecia was discussed.  START topical minoxidil 2% twice daily to frontal scalp (prescription coverage)

## 2025-08-06 NOTE — PROGRESS NOTES
Subjective     Shi Trevino is a 49 y.o. female who presents for the following: Suspicious Skin Lesion (Patient has been experiencing peeling of skin on left lateral neck for past 2 months. She has been applying Oil of Olay and jared butter.  She also states that her buttocks are scaly and dry.) and Alopecia (Patient has been experiencing hair loss for several years. Currently using Sulfer 8. ).        Pt reports rash on back on neck and buttocks that started 2-3 months ago. It's itchy and flaky. She has only used cocoa butter and vaseline for it. She also has concerns for hair loss for about 1 year. She has used sulfur 8 medicated grease but hasn't noticed it helps.      Review of Systems:  No other skin or systemic complaints other than what is documented elsewhere in the note.    The following portions of the chart were reviewed this encounter and updated as appropriate:          Skin Cancer History  Biopsy Log Book  No skin cancers from Specimen Tracking.    Additional History      Specialty Problems    None       Objective   Well appearing patient in no apparent distress; mood and affect are within normal limits.    A focused skin examination was performed. All findings within normal limits unless otherwise noted below.    Assessment/Plan   Skin Exam  1. IRRITANT CONTACT DERMATITIS, UNSPECIFIED TRIGGER  Gluteal Crease  Dry scaly flesh colored plaque in gluteal cleft  Discussed the inflammatory nature of this condition    START triamcinolone 0.025% twice daily for 2 weeks to buttocks and neck. Can repeat as needed for irritation.     Encouraged to continue using vaseline and moisturizer to these areas.  2. TRACTION ALOPECIA  Scalp  Scarring alopecia on frontal scalp with fringe sign.  The nature of this scarring alopecia was discussed.  START topical minoxidil 2% twice daily to frontal scalp.  3. HYPERKERATOSIS (2)  Left Foot - Anterior, Right Foot - Anterior  Hyperkeratotic scale of bilateral plantar  feet  Advised patient to keep her feet moisturized with vaseline  START ammonium lactate 12% daily for her feet  Dimitri Houser MD, PGY-2  Department of Dermatology    I saw and evaluated the patient. I personally obtained the key and critical portions of the history and physical exam or was physically present for key and critical portions performed by the resident. I reviewed the resident's documentation and discussed the patient with the resident. I agree with the resident's medical decision making as documented in the note.    Pennie Glover MD

## 2025-08-06 NOTE — Clinical Note
The nature of the diagnosis was explained to the patient today. Dry skin is common, can be worsened due to climate (dry climate makes worse), harsh soaps and lack of moisturizing. It may worsen as we age. We discussed a gentle skin care regimen including washing with a mild soap without fragrance such as dove for sensitive skin, cetaphil or cerave. Pat dry after washing and then apply a thick moisturizer such as Cerave cream or cetaphil cream.